# Patient Record
Sex: MALE | Race: BLACK OR AFRICAN AMERICAN | Employment: OTHER | URBAN - METROPOLITAN AREA
[De-identification: names, ages, dates, MRNs, and addresses within clinical notes are randomized per-mention and may not be internally consistent; named-entity substitution may affect disease eponyms.]

---

## 2017-05-11 ENCOUNTER — HOSPITAL ENCOUNTER (EMERGENCY)
Facility: HOSPITAL | Age: 32
Discharge: HOME | End: 2017-05-11
Attending: EMERGENCY MEDICINE
Payer: COMMERCIAL

## 2017-05-11 VITALS
OXYGEN SATURATION: 100 % | BODY MASS INDEX: 22.62 KG/M2 | TEMPERATURE: 98 F | HEART RATE: 92 BPM | RESPIRATION RATE: 18 BRPM | HEIGHT: 72 IN | DIASTOLIC BLOOD PRESSURE: 76 MMHG | SYSTOLIC BLOOD PRESSURE: 128 MMHG | WEIGHT: 167 LBS

## 2017-05-11 DIAGNOSIS — L98.491 CALLOUS ULCER, LIMITED TO BREAKDOWN OF SKIN (HCC): Primary | ICD-10-CM

## 2017-05-11 PROCEDURE — 99282 EMERGENCY DEPT VISIT SF MDM: CPT

## 2017-05-11 RX ORDER — GABAPENTIN 300 MG/1
300 CAPSULE ORAL DAILY
COMMUNITY
End: 2020-03-05

## 2017-05-11 NOTE — ED INITIAL ASSESSMENT (HPI)
32 y/o male to ED with c/o of ulcer to right toe. Patient instructed to come to ED by podiatrist to r/o infection.

## 2017-05-11 NOTE — ED PROVIDER NOTES
Patient Seen in: [facility] Emergency Department    History   Patient presents with:  Cellulitis (integumentary, infectious)    Stated Complaint: foot ulcer    HPI    Patient is a 33-year-old male comes in emergency room for evaluation of a wound to 1215 None (Room air)       Current:/76 mmHg  Pulse 92  Temp(Src) 98 °F (36.7 °C) (Temporal)  Resp 18  Ht 182.9 cm (6')  Wt 75.751 kg  BMI 22.64 kg/m2  SpO2 100%        Physical Exam    CONSTITUTIONAL: Well appearing, in no acute distress  LUNGS: Tari diagnosis)    Disposition:  Discharge    Follow-up:  Tamera Reid MD  [address] [place] [id number]  [phone]      As needed      Medications Prescribed:  Discharge Medication List as of 5/11/2017  1:36 PM

## 2017-09-08 ENCOUNTER — LAB ENCOUNTER (OUTPATIENT)
Dept: LAB | Age: 32
End: 2017-09-08
Attending: INTERNAL MEDICINE
Payer: COMMERCIAL

## 2017-09-08 DIAGNOSIS — N18.30 CHRONIC KIDNEY DISEASE, STAGE III (MODERATE) (HCC): Primary | ICD-10-CM

## 2017-09-08 DIAGNOSIS — R80.9 PROTEINURIA: ICD-10-CM

## 2017-09-08 LAB
BASOPHILS # BLD AUTO: 0.03 X10(3) UL (ref 0–0.1)
BASOPHILS NFR BLD AUTO: 0.6 %
BILIRUB UR QL STRIP.AUTO: NEGATIVE
CLARITY UR REFRACT.AUTO: CLEAR
COLOR UR AUTO: YELLOW
COMPLEMENT C3: 116 MG/DL (ref 90–180)
COMPLEMENT C4: 41.6 MG/DL (ref 10–40)
EOSINOPHIL # BLD AUTO: 0.08 X10(3) UL (ref 0–0.3)
EOSINOPHIL NFR BLD AUTO: 1.6 %
ERYTHROCYTE [DISTWIDTH] IN BLOOD BY AUTOMATED COUNT: 13.3 % (ref 11.5–16)
GLUCOSE UR STRIP.AUTO-MCNC: NEGATIVE MG/DL
HCT VFR BLD AUTO: 28.5 % (ref 37–53)
HEPATITIS C VIRUS AB INTERPRETATION: NONREACTIVE
HGB BLD-MCNC: 9.4 G/DL (ref 13–17)
IMMATURE GRANULOCYTE COUNT: 0.01 X10(3) UL (ref 0–1)
IMMATURE GRANULOCYTE RATIO %: 0.2 %
KETONES UR STRIP.AUTO-MCNC: NEGATIVE MG/DL
LEUKOCYTE ESTERASE UR QL STRIP.AUTO: NEGATIVE
LYMPHOCYTES # BLD AUTO: 1.42 X10(3) UL (ref 0.9–4)
LYMPHOCYTES NFR BLD AUTO: 28.9 %
MCH RBC QN AUTO: 28.7 PG (ref 27–33.2)
MCHC RBC AUTO-ENTMCNC: 33 G/DL (ref 31–37)
MCV RBC AUTO: 87.2 FL (ref 80–99)
MONOCYTES # BLD AUTO: 0.31 X10(3) UL (ref 0.1–0.6)
MONOCYTES NFR BLD AUTO: 6.3 %
NEUTROPHIL ABS PRELIM: 3.06 X10 (3) UL (ref 1.3–6.7)
NEUTROPHILS # BLD AUTO: 3.06 X10(3) UL (ref 1.3–6.7)
NEUTROPHILS NFR BLD AUTO: 62.4 %
NITRITE UR QL STRIP.AUTO: NEGATIVE
PH UR STRIP.AUTO: 6 [PH] (ref 4.5–8)
PLATELET # BLD AUTO: 301 10(3)UL (ref 150–450)
PROT UR STRIP.AUTO-MCNC: >=500 MG/DL
RBC # BLD AUTO: 3.27 X10(6)UL (ref 4.3–5.7)
RED CELL DISTRIBUTION WIDTH-SD: 41.8 FL (ref 35.1–46.3)
SP GR UR STRIP.AUTO: 1.01 (ref 1–1.03)
UROBILINOGEN UR STRIP.AUTO-MCNC: <2 MG/DL
WBC # BLD AUTO: 4.9 X10(3) UL (ref 4–13)

## 2017-09-08 PROCEDURE — 36415 COLL VENOUS BLD VENIPUNCTURE: CPT

## 2017-09-08 PROCEDURE — 81001 URINALYSIS AUTO W/SCOPE: CPT

## 2017-09-08 PROCEDURE — 87389 HIV-1 AG W/HIV-1&-2 AB AG IA: CPT

## 2017-09-08 PROCEDURE — 84156 ASSAY OF PROTEIN URINE: CPT

## 2017-09-08 PROCEDURE — 83883 ASSAY NEPHELOMETRY NOT SPEC: CPT

## 2017-09-08 PROCEDURE — 82595 ASSAY OF CRYOGLOBULIN: CPT

## 2017-09-08 PROCEDURE — 86803 HEPATITIS C AB TEST: CPT

## 2017-09-08 PROCEDURE — 84165 PROTEIN E-PHORESIS SERUM: CPT

## 2017-09-08 PROCEDURE — 86160 COMPLEMENT ANTIGEN: CPT

## 2017-09-08 PROCEDURE — 85025 COMPLETE CBC W/AUTO DIFF WBC: CPT

## 2017-09-08 PROCEDURE — 86335 IMMUNFIX E-PHORSIS/URINE/CSF: CPT

## 2017-09-08 PROCEDURE — 86038 ANTINUCLEAR ANTIBODIES: CPT

## 2017-09-08 PROCEDURE — 86334 IMMUNOFIX E-PHORESIS SERUM: CPT

## 2017-09-10 LAB — ANA SCREEN: NEGATIVE

## 2017-09-11 LAB
ALBUMIN, URINE: DETECTED
ALPHA-1, URINE: DETECTED
ALPHA-2, URINE: DETECTED
FREE UR KAPPA/LAMBDA RATIO: 8.4 RATIO
FREE UR LAMBDA LIGHT CHAIN: 1.25 MG/DL
FREE URINARY KAPPA LIGHT CHAIN: 10.5 MG/DL
GAMMA, URINE: DETECTED
URINE BETA GLOBULIN: DETECTED

## 2017-09-13 LAB
A/G RATIO: 1.28
ALBUMIN, SERUM: 3.53 G/DL (ref 3.5–4.8)
ALPHA-1 GLOBULIN: 0.18 G/DL (ref 0.1–0.3)
ALPHA-2 GLOBULIN: 0.87 G/DL (ref 0.6–1)
BETA GLOBULIN: 0.73 G/DL (ref 0.7–1.2)
GAMMA GLOBULIN: 0.98 G/DL (ref 0.6–1.6)
KAPPA FREE LIGHT CHAIN: 3.92 MG/DL (ref 0.33–1.94)
KAPPA/LAMBDA FLC RATIO: 1.91 (ref 0.26–1.65)
LAMBDA FREE LIGHT CHAIN: 2.05 MG/DL (ref 0.57–2.63)
TOTAL PROTEIN,SERUM: 6.3 G/DL (ref 6.1–8.3)

## 2019-09-23 ENCOUNTER — APPOINTMENT (OUTPATIENT)
Dept: GENERAL RADIOLOGY | Facility: HOSPITAL | Age: 34
End: 2019-09-23
Attending: EMERGENCY MEDICINE
Payer: MEDICARE

## 2019-09-23 ENCOUNTER — HOSPITAL ENCOUNTER (EMERGENCY)
Facility: HOSPITAL | Age: 34
Discharge: LEFT BEFORE DISCHARGE | End: 2019-09-23
Attending: EMERGENCY MEDICINE
Payer: MEDICARE

## 2019-09-23 VITALS
WEIGHT: 200 LBS | TEMPERATURE: 98 F | BODY MASS INDEX: 27 KG/M2 | SYSTOLIC BLOOD PRESSURE: 178 MMHG | RESPIRATION RATE: 15 BRPM | DIASTOLIC BLOOD PRESSURE: 102 MMHG | HEART RATE: 84 BPM | OXYGEN SATURATION: 100 %

## 2019-09-23 DIAGNOSIS — L98.491 SKIN ULCER, LIMITED TO BREAKDOWN OF SKIN (HCC): ICD-10-CM

## 2019-09-23 DIAGNOSIS — Z53.29 LEFT AGAINST MEDICAL ADVICE: ICD-10-CM

## 2019-09-23 DIAGNOSIS — L84 SKIN CALLUS: ICD-10-CM

## 2019-09-23 DIAGNOSIS — N17.9 ACUTE RENAL FAILURE, UNSPECIFIED ACUTE RENAL FAILURE TYPE (HCC): Primary | ICD-10-CM

## 2019-09-23 LAB
ANION GAP SERPL CALC-SCNC: 11 MMOL/L (ref 0–18)
BASOPHILS # BLD AUTO: 0.05 X10(3) UL (ref 0–0.2)
BASOPHILS NFR BLD AUTO: 0.6 %
BUN BLD-MCNC: 75 MG/DL (ref 7–18)
BUN/CREAT SERPL: 8.2 (ref 10–20)
CALCIUM BLD-MCNC: 6.1 MG/DL (ref 8.5–10.1)
CHLORIDE SERPL-SCNC: 109 MMOL/L (ref 98–112)
CO2 SERPL-SCNC: 19 MMOL/L (ref 21–32)
CREAT BLD-MCNC: 9.14 MG/DL (ref 0.7–1.3)
DEPRECATED RDW RBC AUTO: 42.5 FL (ref 35.1–46.3)
EOSINOPHIL # BLD AUTO: 0.12 X10(3) UL (ref 0–0.7)
EOSINOPHIL NFR BLD AUTO: 1.5 %
ERYTHROCYTE [DISTWIDTH] IN BLOOD BY AUTOMATED COUNT: 13 % (ref 11–15)
GLUCOSE BLD-MCNC: 110 MG/DL (ref 70–99)
HCT VFR BLD AUTO: 25.8 % (ref 39–53)
HGB BLD-MCNC: 8.3 G/DL (ref 13–17.5)
IMM GRANULOCYTES # BLD AUTO: 0.02 X10(3) UL (ref 0–1)
IMM GRANULOCYTES NFR BLD: 0.3 %
LYMPHOCYTES # BLD AUTO: 1.31 X10(3) UL (ref 1–4)
LYMPHOCYTES NFR BLD AUTO: 16.9 %
MCH RBC QN AUTO: 28.9 PG (ref 26–34)
MCHC RBC AUTO-ENTMCNC: 32.2 G/DL (ref 31–37)
MCV RBC AUTO: 89.9 FL (ref 80–100)
MONOCYTES # BLD AUTO: 0.51 X10(3) UL (ref 0.1–1)
MONOCYTES NFR BLD AUTO: 6.6 %
NEUTROPHILS # BLD AUTO: 5.75 X10 (3) UL (ref 1.5–7.7)
NEUTROPHILS # BLD AUTO: 5.75 X10(3) UL (ref 1.5–7.7)
NEUTROPHILS NFR BLD AUTO: 74.1 %
OSMOLALITY SERPL CALC.SUM OF ELEC: 311 MOSM/KG (ref 275–295)
PLATELET # BLD AUTO: 295 10(3)UL (ref 150–450)
POTASSIUM SERPL-SCNC: 4.8 MMOL/L (ref 3.5–5.1)
RBC # BLD AUTO: 2.87 X10(6)UL (ref 4.3–5.7)
SODIUM SERPL-SCNC: 139 MMOL/L (ref 136–145)
WBC # BLD AUTO: 7.8 X10(3) UL (ref 4–11)

## 2019-09-23 PROCEDURE — 99283 EMERGENCY DEPT VISIT LOW MDM: CPT

## 2019-09-23 PROCEDURE — 85025 COMPLETE CBC W/AUTO DIFF WBC: CPT | Performed by: EMERGENCY MEDICINE

## 2019-09-23 PROCEDURE — 80048 BASIC METABOLIC PNL TOTAL CA: CPT | Performed by: EMERGENCY MEDICINE

## 2019-09-23 PROCEDURE — 73630 X-RAY EXAM OF FOOT: CPT | Performed by: EMERGENCY MEDICINE

## 2019-09-23 PROCEDURE — 36415 COLL VENOUS BLD VENIPUNCTURE: CPT

## 2019-09-23 RX ORDER — ENALAPRILAT 2.5 MG/2ML
0.62 INJECTION INTRAVENOUS ONCE
Status: DISCONTINUED | OUTPATIENT
Start: 2019-09-23 | End: 2019-09-23

## 2019-09-23 NOTE — ED NOTES
Pt and family still wishing to leave AMA despite multiple attempts by ED physician, Rachel JIMENEZ and this RN to educate them on emergent need for evaluation of kidney function. AMA formed signed and added to paper chart.

## 2019-09-23 NOTE — ED NOTES
Parent at bedside questioning need for IV placement. Explained to pt and parent at bedside that IV is ordered due to the possible need for IV antibiotics.  This RN offered to do peripheral draw but educated pt that this might require a 2nd stick later to

## 2019-09-23 NOTE — ED PROVIDER NOTES
Patient Seen in: [facility] Emergency Department      History   Patient presents with:  Rash Skin Problem (integumentary)    Stated Complaint: blister to foot x 2 weeks, diabetic    HPI    Patient is a 61-year-old male. Medical history of diabetes. retraction  Extremities: Full ROM, no deformity, NVI  Back: Full range of motion  Skin: Large ulcerated callus to the base of the right great toe. Encompasses 1.5 inch x 0.75 inch. No surrounding erythema. No fluctuance. No ascending lymphangitis.   Spencer demonstrates a hemoglobin of 8.3    I recommended admission to the patient. His mother was immediately and aggressively in opposition of this.   In her opinion, the patient should have no medical intervention unless his cardiologist or other specialist agr

## 2019-09-23 NOTE — ED NOTES
Pt and parent declining ordered Vasotec at this time. Explained to pt and family at bedside why medication is indicated for high blood pressure noted during visit today. Pt states \"I don't want any blood pressure medication, I'll see my doctor. \"  ED

## 2019-09-23 NOTE — ED NOTES
This RN at bedside after [place] recommended admission to hospital.   Pt currently declining admission, mother at bedside states \"We want to get a second opinion. \"  ED physician made aware, called to bedside to further discuss results and possible care p

## 2020-02-06 PROBLEM — L97.511 SKIN ULCER OF RIGHT FOOT, LIMITED TO BREAKDOWN OF SKIN (HCC): Status: ACTIVE | Noted: 2020-02-06

## 2020-02-13 ENCOUNTER — OFFICE VISIT (OUTPATIENT)
Dept: WOUND CARE | Facility: HOSPITAL | Age: 35
End: 2020-02-13
Attending: NURSE PRACTITIONER
Payer: MEDICARE

## 2020-02-13 DIAGNOSIS — L97.511 SKIN ULCER OF RIGHT FOOT, LIMITED TO BREAKDOWN OF SKIN (HCC): Primary | ICD-10-CM

## 2020-02-13 LAB — GLUCOSE BLD-MCNC: 216 MG/DL (ref 70–99)

## 2020-02-13 PROCEDURE — 99214 OFFICE O/P EST MOD 30 MIN: CPT

## 2020-02-13 PROCEDURE — 11042 DBRDMT SUBQ TIS 1ST 20SQCM/<: CPT

## 2020-02-13 PROCEDURE — 82962 GLUCOSE BLOOD TEST: CPT

## 2020-02-13 NOTE — PROGRESS NOTES
Subjective    Chief Complaint  This information was obtained from the patient  Patient is here for an initial consult. He presents with a wound on his big toe that re-opened in September. He does not have pain to the wound as he has neuropathy.     Allergie This information was obtained from the patient, chart    Complaints and Symptoms  Patient complains of:  Eyes: Other (retinopathy)  Genitourinary (): Other (Hemodialysis)  Integumentary (Hair/Skin/Nails): Dryness, Calluses/Corns, Ulcers  Neurological: Lo The periwound skin exhibited: Callus, Dry/Scaly. The periwound skin did not exhibit: Edema, Erythema. The temperature of the periwound skin is WNL. Periwound skin does not exhibit signs or symptoms of infection.  Local Pulse is Normal.  General Notes:  Deep (Encounter Diagnosis) E11.621 - Type 2 diabetes mellitus with foot ulcer  (Encounter Diagnosis) Z79.4 - Long term (current) use of insulin        Procedures    Wound #1  Wound #1 (Diabetic Ulcer) is located on the right, plantar great toe.  A skin/subcutane Vitamin C: Citrus fruits and juices, strawberries, tomatoes, tomato juice, peppers, baked potatoes, spinach, broccoli, cauliflower, [place] sprouts, cabbage  Vitamin A: Dark green, leafy vegetables, orange or yellow vegetables, cantaloupe, fortified dairy Mckayla Patel, ERNESTO-AP, CFCN, CSWS, [facility], [facility] 02/13/2020 12:45:43       Entered By: George Kendrick on 02/13/2020 12:45:19

## 2020-02-18 ENCOUNTER — HOSPITAL ENCOUNTER (OUTPATIENT)
Dept: MRI IMAGING | Facility: HOSPITAL | Age: 35
Discharge: HOME | End: 2020-02-18
Attending: NURSE PRACTITIONER
Payer: MEDICARE

## 2020-02-18 DIAGNOSIS — L97.512 NON-PRESSURE CHRONIC ULCER OF OTHER PART OF RIGHT FOOT WITH FAT LAYER EXPOSED (HCC): ICD-10-CM

## 2020-02-18 PROCEDURE — 73718 MRI LOWER EXTREMITY W/O DYE: CPT | Performed by: NURSE PRACTITIONER

## 2020-02-20 ENCOUNTER — OFFICE VISIT (OUTPATIENT)
Dept: WOUND CARE | Facility: HOSPITAL | Age: 35
End: 2020-02-20
Attending: NURSE PRACTITIONER
Payer: MEDICARE

## 2020-02-20 DIAGNOSIS — L97.512 NON-PRESSURE CHRONIC ULCER OF OTHER PART OF RIGHT FOOT WITH FAT LAYER EXPOSED (HCC): Primary | ICD-10-CM

## 2020-02-20 DIAGNOSIS — L97.511 SKIN ULCER OF RIGHT FOOT, LIMITED TO BREAKDOWN OF SKIN (HCC): ICD-10-CM

## 2020-02-20 LAB — GLUCOSE BLD-MCNC: 240 MG/DL (ref 70–99)

## 2020-02-20 PROCEDURE — 11042 DBRDMT SUBQ TIS 1ST 20SQCM/<: CPT

## 2020-02-20 PROCEDURE — 82962 GLUCOSE BLOOD TEST: CPT

## 2020-02-20 NOTE — PROGRESS NOTES
Subjective    Chief Complaint  This information was obtained from the patient  Patient in Clinic for follow up on wound care to right great toe.  Denies pain due to neuropathy    Allergies  No known allergies    HPI  This information was obtained from the p Additional Information  Medication reconciliation completed at today's visit. : Yes        Objective    Wound Assessment(s)  Wound #1 Right, Plantar Great Toe is a chronic Hall Grade 1 Diabetic Ulcer and has received a status of Not Healed.  Subsequent wo dp/pt weakly palpable right. right lower Extremity free of varicosities, clubbing or edema. Capillary refill < 3 seconds. Digits are warm on the right. toenails are wnl for color, thickness and hygeine, but long in length. + hairgrowth on legs. .    Jackie Boykin Follow-Up Appointments  Return Appointment in 1 week. Other: - 1) get an appointment with ID (see below)  2) make an appointment for HBO consult and tech consult    HBOT  Evaluate for HBOT.   HBO Tech consult  Indication: - DFU with osteo    Misc/Additiona There is diffuse bone marrow edema on the STIR sequence with at least 1 small focus of decreased signal on the T1 weighted series in the mid to distal diaphysis of the distal phalanx of the right 1st toe, findings would be consistent with some reactive  carlos

## 2020-02-27 ENCOUNTER — OFFICE VISIT (OUTPATIENT)
Dept: WOUND CARE | Facility: HOSPITAL | Age: 35
End: 2020-02-27
Attending: NURSE PRACTITIONER
Payer: MEDICARE

## 2020-02-27 DIAGNOSIS — L97.511 SKIN ULCER OF RIGHT FOOT, LIMITED TO BREAKDOWN OF SKIN (HCC): ICD-10-CM

## 2020-02-27 DIAGNOSIS — L97.512 NON-PRESSURE CHRONIC ULCER OF OTHER PART OF RIGHT FOOT WITH FAT LAYER EXPOSED (HCC): Primary | ICD-10-CM

## 2020-02-27 LAB — GLUCOSE BLD-MCNC: 149 MG/DL (ref 70–99)

## 2020-02-27 PROCEDURE — 99213 OFFICE O/P EST LOW 20 MIN: CPT

## 2020-02-27 PROCEDURE — 82962 GLUCOSE BLOOD TEST: CPT

## 2020-03-04 ENCOUNTER — PATIENT OUTREACH (OUTPATIENT)
Dept: INFECTIOUS DISEASE | Facility: CLINIC | Age: 35
End: 2020-03-04

## 2020-03-04 NOTE — PROGRESS NOTES
INFECTIOUS DISEASE CONSULTATION    Joy Santacruz     1985 MRN [id number]   [facility]       PCP None Pcp     Requested by Bakari Phan    Reason for Consultation:  Ulcer left 1st toe, MRI findings    Histo auscultation bilaterally. No wheezes. No rhonchi. Cardiovascular: S1, S2.  Regular rate and rhythm. No murmurs. Abdomen: Soft, nontender, nondistended. Positive bowel sounds. Musculoskeletal: Full range of motion of all extremities.   No swelling note

## 2020-03-05 ENCOUNTER — OFFICE VISIT (OUTPATIENT)
Dept: WOUND CARE | Facility: HOSPITAL | Age: 35
End: 2020-03-05
Attending: NURSE PRACTITIONER
Payer: MEDICARE

## 2020-03-05 DIAGNOSIS — L97.511 SKIN ULCER OF RIGHT FOOT, LIMITED TO BREAKDOWN OF SKIN (HCC): ICD-10-CM

## 2020-03-05 DIAGNOSIS — L97.512 NON-PRESSURE CHRONIC ULCER OF OTHER PART OF RIGHT FOOT WITH FAT LAYER EXPOSED (HCC): Primary | ICD-10-CM

## 2020-03-05 LAB — GLUCOSE BLD-MCNC: 193 MG/DL (ref 70–99)

## 2020-03-05 PROCEDURE — 82962 GLUCOSE BLOOD TEST: CPT

## 2020-03-05 PROCEDURE — 11042 DBRDMT SUBQ TIS 1ST 20SQCM/<: CPT

## 2020-03-05 NOTE — PROGRESS NOTES
Subjective    Chief Complaint  This information was obtained from the patient  Patient in Clinic for follow up on wound care to right great toe. Denies any new concerns or pain. States he went to the doctors yesterday and his BP was fine.     Allergies  No Genitourinary (): Other (Hemodialysis)  Integumentary (Hair/Skin/Nails): Dryness, Calluses/Corns, Ulcers  Neurological: Loss of Protective Sensation    Patient denies complaints or symptoms related to:  Constitutional Symptoms (General Health):  Fatigue, bp elevated recheck improved, patient denies symptoms of headache, dizziness, sob, or vision changes. will continue to monitor. Pulse Regular and wnl for patient. Nohemi Santana Respirations easy and unlabored. Temperature wnl. Weight normal for height. . Appearance neat Wound #1 (Diabetic Ulcer) is located on the right, plantar great toe. A skin/subcutaneous tissue level excisional/surgical debridement with a total area debrided of 0.5 sq cm was performed by Sheila Reese NP.  Subcutaneous was removed along with devitali Vitamin A: Dark green, leafy vegetables, orange or yellow vegetables, cantaloupe, fortified dairy products, liver, fortified cereals  Zinc: Fortified cereals, red meats, seafood    S/S of Infection  Non-adherence    Additional Orders:    Care summary  Revi will utilize felted foam today and consider skin subs if the callus is less next week.     Electronic Signature(s)  Signed By: Date:  Fredrich Gowers FNP-KELLY, ERNESTO-AP, CFNATI, CSWS, [facility], [address] [place] [address] I-20 03/05/2020 09:12:12       Entered By: Fredrich Gowers on 03/05/2020 09:1

## 2020-03-06 ENCOUNTER — HOSPITAL ENCOUNTER (OUTPATIENT)
Dept: CV DIAGNOSTICS | Facility: HOSPITAL | Age: 35
Discharge: HOME | End: 2020-03-06
Attending: SURGERY
Payer: MEDICARE

## 2020-03-06 ENCOUNTER — HOSPITAL ENCOUNTER (OUTPATIENT)
Dept: LAB | Facility: HOSPITAL | Age: 35
Discharge: HOME | End: 2020-03-06
Attending: SURGERY
Payer: MEDICARE

## 2020-03-06 DIAGNOSIS — N18.6 ESRD (END STAGE RENAL DISEASE) (HCC): ICD-10-CM

## 2020-03-06 DIAGNOSIS — Z01.818 PREOP TESTING: ICD-10-CM

## 2020-03-06 DIAGNOSIS — Z91.89 AT RISK FOR BLEEDING: ICD-10-CM

## 2020-03-06 LAB
ALBUMIN SERPL-MCNC: 3.6 G/DL (ref 3.4–5)
ALBUMIN/GLOB SERPL: 0.8 {RATIO} (ref 1–2)
ALP LIVER SERPL-CCNC: 93 U/L (ref 45–117)
ALT SERPL-CCNC: 25 U/L (ref 16–61)
ANION GAP SERPL CALC-SCNC: 4 MMOL/L (ref 0–18)
APTT PPP: 39.6 SECONDS (ref 25.4–36.1)
AST SERPL-CCNC: 20 U/L (ref 15–37)
ATRIAL RATE: 87 BPM
BASOPHILS # BLD AUTO: 0.06 X10(3) UL (ref 0–0.2)
BASOPHILS NFR BLD AUTO: 0.9 %
BILIRUB SERPL-MCNC: 0.7 MG/DL (ref 0.1–2)
BUN BLD-MCNC: 22 MG/DL (ref 7–18)
BUN/CREAT SERPL: 4.9 (ref 10–20)
CALCIUM BLD-MCNC: 9.1 MG/DL (ref 8.5–10.1)
CHLORIDE SERPL-SCNC: 99 MMOL/L (ref 98–112)
CO2 SERPL-SCNC: 35 MMOL/L (ref 21–32)
CREAT BLD-MCNC: 4.5 MG/DL (ref 0.7–1.3)
DEPRECATED RDW RBC AUTO: 46.8 FL (ref 35.1–46.3)
EOSINOPHIL # BLD AUTO: 0.06 X10(3) UL (ref 0–0.7)
EOSINOPHIL NFR BLD AUTO: 0.9 %
ERYTHROCYTE [DISTWIDTH] IN BLOOD BY AUTOMATED COUNT: 14.6 % (ref 11–15)
GLOBULIN PLAS-MCNC: 4.3 G/DL (ref 2.8–4.4)
GLUCOSE BLD-MCNC: 136 MG/DL (ref 70–99)
HCT VFR BLD AUTO: 37.3 % (ref 39–53)
HGB BLD-MCNC: 12 G/DL (ref 13–17.5)
IMM GRANULOCYTES # BLD AUTO: 0.02 X10(3) UL (ref 0–1)
IMM GRANULOCYTES NFR BLD: 0.3 %
INR BLD: 0.93 (ref 0.9–1.1)
LYMPHOCYTES # BLD AUTO: 1.81 X10(3) UL (ref 1–4)
LYMPHOCYTES NFR BLD AUTO: 26.2 %
M PROTEIN MFR SERPL ELPH: 7.9 G/DL (ref 6.4–8.2)
MCH RBC QN AUTO: 28.7 PG (ref 26–34)
MCHC RBC AUTO-ENTMCNC: 32.2 G/DL (ref 31–37)
MCV RBC AUTO: 89.2 FL (ref 80–100)
MONOCYTES # BLD AUTO: 0.39 X10(3) UL (ref 0.1–1)
MONOCYTES NFR BLD AUTO: 5.6 %
NEUTROPHILS # BLD AUTO: 4.57 X10 (3) UL (ref 1.5–7.7)
NEUTROPHILS # BLD AUTO: 4.57 X10(3) UL (ref 1.5–7.7)
NEUTROPHILS NFR BLD AUTO: 66.1 %
OSMOLALITY SERPL CALC.SUM OF ELEC: 291 MOSM/KG (ref 275–295)
P AXIS: 57 DEGREES
P-R INTERVAL: 148 MS
PATIENT FASTING Y/N/NP: NO
PLATELET # BLD AUTO: 250 10(3)UL (ref 150–450)
POTASSIUM SERPL-SCNC: 3.7 MMOL/L (ref 3.5–5.1)
PSA SERPL DL<=0.01 NG/ML-MCNC: 12.8 SECONDS (ref 12.5–14.7)
Q-T INTERVAL: 410 MS
QRS DURATION: 92 MS
QTC CALCULATION (BEZET): 493 MS
R AXIS: 62 DEGREES
RBC # BLD AUTO: 4.18 X10(6)UL (ref 4.3–5.7)
SODIUM SERPL-SCNC: 138 MMOL/L (ref 136–145)
T AXIS: 59 DEGREES
VENTRICULAR RATE: 87 BPM
WBC # BLD AUTO: 6.9 X10(3) UL (ref 4–11)

## 2020-03-06 PROCEDURE — 36415 COLL VENOUS BLD VENIPUNCTURE: CPT | Performed by: SURGERY

## 2020-03-06 PROCEDURE — 85025 COMPLETE CBC W/AUTO DIFF WBC: CPT | Performed by: SURGERY

## 2020-03-06 PROCEDURE — 93005 ELECTROCARDIOGRAM TRACING: CPT

## 2020-03-06 PROCEDURE — 93010 ELECTROCARDIOGRAM REPORT: CPT | Performed by: INTERNAL MEDICINE

## 2020-03-06 PROCEDURE — 85610 PROTHROMBIN TIME: CPT | Performed by: SURGERY

## 2020-03-06 PROCEDURE — 85730 THROMBOPLASTIN TIME PARTIAL: CPT | Performed by: SURGERY

## 2020-03-06 PROCEDURE — 80053 COMPREHEN METABOLIC PANEL: CPT | Performed by: SURGERY

## 2020-03-08 ENCOUNTER — HOSPITAL ENCOUNTER (OUTPATIENT)
Dept: ULTRASOUND IMAGING | Age: 35
Discharge: HOME | End: 2020-03-08
Attending: SURGERY
Payer: MEDICARE

## 2020-03-08 DIAGNOSIS — T82.318A MECHANICAL BREAKDOWN OF OTHER VASCULAR GRAFT, INITIAL ENCOUNTER (HCC): ICD-10-CM

## 2020-03-08 DIAGNOSIS — N18.6 END STAGE RENAL DISEASE (HCC): ICD-10-CM

## 2020-03-08 DIAGNOSIS — Z01.818 PRE-OP TESTING: ICD-10-CM

## 2020-03-08 PROCEDURE — 93970 EXTREMITY STUDY: CPT | Performed by: SURGERY

## 2020-03-10 ENCOUNTER — OFFICE VISIT (OUTPATIENT)
Dept: WOUND CARE | Facility: HOSPITAL | Age: 35
End: 2020-03-10
Attending: NURSE PRACTITIONER
Payer: MEDICARE

## 2020-03-10 DIAGNOSIS — L97.511 SKIN ULCER OF RIGHT FOOT, LIMITED TO BREAKDOWN OF SKIN (HCC): ICD-10-CM

## 2020-03-10 DIAGNOSIS — L97.512 NON-PRESSURE CHRONIC ULCER OF OTHER PART OF RIGHT FOOT WITH FAT LAYER EXPOSED (HCC): Primary | ICD-10-CM

## 2020-03-10 LAB — GLUCOSE BLD-MCNC: 234 MG/DL (ref 70–99)

## 2020-03-10 PROCEDURE — 11042 DBRDMT SUBQ TIS 1ST 20SQCM/<: CPT

## 2020-03-10 PROCEDURE — 82962 GLUCOSE BLOOD TEST: CPT

## 2020-03-10 NOTE — PROGRESS NOTES
Subjective    Chief Complaint  This information was obtained from the patient  Patient here for follow up on wound care to right great toe. Denies any new concerns or pain.     Allergies  No known allergies    HPI  This information was obtained from the pat 3-10-20 patient returns today, he has significan callus build up again, the periwound is very moist, he does have an appointment next week with dr. Mode Valdez for hbo consult.   we discussed a knee roller or a forefoot offloading shoe to help with offloading The periwound skin exhibited: Callus, Moist, Maceration. The periwound skin did not exhibit: Edema, Erythema. The temperature of the periwound skin is WNL. Periwound skin does not exhibit signs or symptoms of infection.  Local Pulse is Normal.    Vitals  He Wound #1 (Diabetic Ulcer) is located on the right, plantar great toe. A skin/subcutaneous tissue level excisional/surgical debridement with a total area debrided of 1.2 sq cm was performed by Lisette Tran NP.  Subcutaneous was removed along with devitali Vitamin C: Citrus fruits and juices, strawberries, tomatoes, tomato juice, peppers, baked potatoes, spinach, broccoli, cauliflower, Bluebell sprouts, cabbage  Vitamin A: Dark green, leafy vegetables, orange or yellow vegetables, cantaloupe, fortified dairy Entered By: Miriam Wilkerson on 03/10/2020 09:52:15

## 2020-03-11 ENCOUNTER — ANESTHESIA EVENT (OUTPATIENT)
Dept: CARDIAC SURGERY | Facility: HOSPITAL | Age: 35
End: 2020-03-11
Payer: MEDICARE

## 2020-03-11 ENCOUNTER — APPOINTMENT (OUTPATIENT)
Dept: WOUND CARE | Facility: HOSPITAL | Age: 35
End: 2020-03-11
Attending: INTERNAL MEDICINE
Payer: MEDICARE

## 2020-03-12 ENCOUNTER — HOSPITAL ENCOUNTER (OUTPATIENT)
Facility: HOSPITAL | Age: 35
Setting detail: HOSPITAL OUTPATIENT SURGERY
Discharge: HOME | End: 2020-03-12
Attending: SURGERY | Admitting: SURGERY
Payer: MEDICARE

## 2020-03-12 ENCOUNTER — APPOINTMENT (OUTPATIENT)
Dept: WOUND CARE | Facility: HOSPITAL | Age: 35
End: 2020-03-12
Attending: NURSE PRACTITIONER
Payer: MEDICARE

## 2020-03-12 ENCOUNTER — ANESTHESIA (OUTPATIENT)
Dept: CARDIAC SURGERY | Facility: HOSPITAL | Age: 35
End: 2020-03-12
Payer: MEDICARE

## 2020-03-12 VITALS
HEART RATE: 84 BPM | TEMPERATURE: 98 F | RESPIRATION RATE: 18 BRPM | DIASTOLIC BLOOD PRESSURE: 95 MMHG | HEIGHT: 72 IN | BODY MASS INDEX: 26.41 KG/M2 | SYSTOLIC BLOOD PRESSURE: 169 MMHG | WEIGHT: 195 LBS | OXYGEN SATURATION: 100 %

## 2020-03-12 DIAGNOSIS — Z01.818 PREOP TESTING: ICD-10-CM

## 2020-03-12 DIAGNOSIS — Z91.89 AT RISK FOR BLEEDING: ICD-10-CM

## 2020-03-12 DIAGNOSIS — N18.6 ESRD (END STAGE RENAL DISEASE) (HCC): Primary | ICD-10-CM

## 2020-03-12 LAB
ANION GAP SERPL CALC-SCNC: 6 MMOL/L (ref 0–18)
BUN BLD-MCNC: 32 MG/DL (ref 7–18)
BUN/CREAT SERPL: 5 (ref 10–20)
CALCIUM BLD-MCNC: 8.8 MG/DL (ref 8.5–10.1)
CHLORIDE SERPL-SCNC: 102 MMOL/L (ref 98–112)
CO2 SERPL-SCNC: 31 MMOL/L (ref 21–32)
CREAT BLD-MCNC: 6.43 MG/DL (ref 0.7–1.3)
GLUCOSE BLD-MCNC: 149 MG/DL (ref 70–99)
GLUCOSE BLD-MCNC: 149 MG/DL (ref 70–99)
OSMOLALITY SERPL CALC.SUM OF ELEC: 298 MOSM/KG (ref 275–295)
POTASSIUM SERPL-SCNC: 4.2 MMOL/L (ref 3.5–5.1)
SODIUM SERPL-SCNC: 139 MMOL/L (ref 136–145)

## 2020-03-12 PROCEDURE — 82962 GLUCOSE BLOOD TEST: CPT

## 2020-03-12 PROCEDURE — 03180JD BYPASS LEFT BRACHIAL ARTERY TO UPPER ARM VEIN WITH SYNTHETIC SUBSTITUTE, OPEN APPROACH: ICD-10-PCS | Performed by: SURGERY

## 2020-03-12 PROCEDURE — 80048 BASIC METABOLIC PNL TOTAL CA: CPT | Performed by: SURGERY

## 2020-03-12 DEVICE — GRAFT 4-6X45 TAP: Type: IMPLANTABLE DEVICE | Site: ARM | Status: FUNCTIONAL

## 2020-03-12 RX ORDER — ONDANSETRON 2 MG/ML
INJECTION INTRAMUSCULAR; INTRAVENOUS AS NEEDED
Status: DISCONTINUED | OUTPATIENT
Start: 2020-03-12 | End: 2020-03-12 | Stop reason: SURG

## 2020-03-12 RX ORDER — BUPIVACAINE HYDROCHLORIDE 5 MG/ML
INJECTION, SOLUTION EPIDURAL; INTRACAUDAL AS NEEDED
Status: DISCONTINUED | OUTPATIENT
Start: 2020-03-12 | End: 2020-03-12 | Stop reason: SURG

## 2020-03-12 RX ORDER — SODIUM CHLORIDE, SODIUM LACTATE, POTASSIUM CHLORIDE, CALCIUM CHLORIDE 600; 310; 30; 20 MG/100ML; MG/100ML; MG/100ML; MG/100ML
INJECTION, SOLUTION INTRAVENOUS CONTINUOUS
Status: DISCONTINUED | OUTPATIENT
Start: 2020-03-12 | End: 2020-03-12

## 2020-03-12 RX ORDER — LIDOCAINE HYDROCHLORIDE 10 MG/ML
INJECTION, SOLUTION EPIDURAL; INFILTRATION; INTRACAUDAL; PERINEURAL AS NEEDED
Status: DISCONTINUED | OUTPATIENT
Start: 2020-03-12 | End: 2020-03-12 | Stop reason: SURG

## 2020-03-12 RX ORDER — DEXAMETHASONE SODIUM PHOSPHATE 4 MG/ML
VIAL (ML) INJECTION AS NEEDED
Status: DISCONTINUED | OUTPATIENT
Start: 2020-03-12 | End: 2020-03-12 | Stop reason: SURG

## 2020-03-12 RX ORDER — HYDROCODONE BITARTRATE AND ACETAMINOPHEN 5; 325 MG/1; MG/1
1 TABLET ORAL EVERY 6 HOURS PRN
Status: DISCONTINUED | OUTPATIENT
Start: 2020-03-12 | End: 2020-03-12

## 2020-03-12 RX ORDER — CALCIUM ACETATE 667 MG/1
2 CAPSULE ORAL 3 TIMES DAILY
COMMUNITY

## 2020-03-12 RX ORDER — HYDROMORPHONE HYDROCHLORIDE 1 MG/ML
0.4 INJECTION, SOLUTION INTRAMUSCULAR; INTRAVENOUS; SUBCUTANEOUS EVERY 5 MIN PRN
Status: DISCONTINUED | OUTPATIENT
Start: 2020-03-12 | End: 2020-03-12 | Stop reason: HOSPADM

## 2020-03-12 RX ORDER — DEXTROSE MONOHYDRATE 25 G/50ML
50 INJECTION, SOLUTION INTRAVENOUS
Status: DISCONTINUED | OUTPATIENT
Start: 2020-03-12 | End: 2020-03-12 | Stop reason: HOSPADM

## 2020-03-12 RX ORDER — ROCURONIUM BROMIDE 10 MG/ML
INJECTION, SOLUTION INTRAVENOUS AS NEEDED
Status: DISCONTINUED | OUTPATIENT
Start: 2020-03-12 | End: 2020-03-12 | Stop reason: SURG

## 2020-03-12 RX ORDER — CEFAZOLIN SODIUM/WATER 2 G/20 ML
SYRINGE (ML) INTRAVENOUS AS NEEDED
Status: DISCONTINUED | OUTPATIENT
Start: 2020-03-12 | End: 2020-03-12 | Stop reason: SURG

## 2020-03-12 RX ORDER — SODIUM CHLORIDE 9 MG/ML
INJECTION, SOLUTION INTRAVENOUS CONTINUOUS PRN
Status: DISCONTINUED | OUTPATIENT
Start: 2020-03-12 | End: 2020-03-12 | Stop reason: SURG

## 2020-03-12 RX ORDER — NALOXONE HYDROCHLORIDE 0.4 MG/ML
80 INJECTION, SOLUTION INTRAMUSCULAR; INTRAVENOUS; SUBCUTANEOUS AS NEEDED
Status: DISCONTINUED | OUTPATIENT
Start: 2020-03-12 | End: 2020-03-12 | Stop reason: HOSPADM

## 2020-03-12 RX ORDER — HYDROCODONE BITARTRATE AND ACETAMINOPHEN 5; 325 MG/1; MG/1
2 TABLET ORAL AS NEEDED
Status: DISCONTINUED | OUTPATIENT
Start: 2020-03-12 | End: 2020-03-12 | Stop reason: HOSPADM

## 2020-03-12 RX ORDER — MIDAZOLAM HYDROCHLORIDE 1 MG/ML
INJECTION INTRAMUSCULAR; INTRAVENOUS AS NEEDED
Status: DISCONTINUED | OUTPATIENT
Start: 2020-03-12 | End: 2020-03-12 | Stop reason: SURG

## 2020-03-12 RX ORDER — AMLODIPINE BESYLATE 10 MG/1
20 TABLET ORAL DAILY
Status: ON HOLD | COMMUNITY
End: 2020-07-06

## 2020-03-12 RX ORDER — HEPARIN SODIUM 5000 [USP'U]/ML
INJECTION, SOLUTION INTRAVENOUS; SUBCUTANEOUS AS NEEDED
Status: DISCONTINUED | OUTPATIENT
Start: 2020-03-12 | End: 2020-03-12 | Stop reason: SURG

## 2020-03-12 RX ORDER — HYDROCODONE BITARTRATE AND ACETAMINOPHEN 5; 325 MG/1; MG/1
1 TABLET ORAL AS NEEDED
Status: DISCONTINUED | OUTPATIENT
Start: 2020-03-12 | End: 2020-03-12 | Stop reason: HOSPADM

## 2020-03-12 RX ORDER — INSULIN ASPART 100 [IU]/ML
INJECTION, SOLUTION INTRAVENOUS; SUBCUTANEOUS ONCE
Status: DISCONTINUED | OUTPATIENT
Start: 2020-03-12 | End: 2020-03-12 | Stop reason: HOSPADM

## 2020-03-12 RX ORDER — PROTAMINE SULFATE 10 MG/ML
INJECTION, SOLUTION INTRAVENOUS AS NEEDED
Status: DISCONTINUED | OUTPATIENT
Start: 2020-03-12 | End: 2020-03-12 | Stop reason: SURG

## 2020-03-12 RX ADMIN — BUPIVACAINE HYDROCHLORIDE 15 ML: 5 INJECTION, SOLUTION EPIDURAL; INTRACAUDAL at 08:10:00

## 2020-03-12 RX ADMIN — PROTAMINE SULFATE 25 MG: 10 INJECTION, SOLUTION INTRAVENOUS at 10:08:00

## 2020-03-12 RX ADMIN — ONDANSETRON 4 MG: 2 INJECTION INTRAMUSCULAR; INTRAVENOUS at 08:39:00

## 2020-03-12 RX ADMIN — SODIUM CHLORIDE: 9 INJECTION, SOLUTION INTRAVENOUS at 10:37:00

## 2020-03-12 RX ADMIN — SODIUM CHLORIDE: 9 INJECTION, SOLUTION INTRAVENOUS at 07:38:00

## 2020-03-12 RX ADMIN — ROCURONIUM BROMIDE 20 MG: 10 INJECTION, SOLUTION INTRAVENOUS at 09:15:00

## 2020-03-12 RX ADMIN — LIDOCAINE HYDROCHLORIDE 50 MG: 10 INJECTION, SOLUTION EPIDURAL; INFILTRATION; INTRACAUDAL; PERINEURAL at 07:43:00

## 2020-03-12 RX ADMIN — HEPARIN SODIUM 11000 UNITS: 5000 INJECTION, SOLUTION INTRAVENOUS; SUBCUTANEOUS at 09:24:00

## 2020-03-12 RX ADMIN — DEXAMETHASONE SODIUM PHOSPHATE 4 MG: 4 MG/ML VIAL (ML) INJECTION at 08:39:00

## 2020-03-12 RX ADMIN — MIDAZOLAM HYDROCHLORIDE 2 MG: 1 INJECTION INTRAMUSCULAR; INTRAVENOUS at 07:41:00

## 2020-03-12 RX ADMIN — CEFAZOLIN SODIUM/WATER 2 G: 2 G/20 ML SYRINGE (ML) INTRAVENOUS at 07:50:00

## 2020-03-12 RX ADMIN — ROCURONIUM BROMIDE 20 MG: 10 INJECTION, SOLUTION INTRAVENOUS at 08:14:00

## 2020-03-12 NOTE — ANESTHESIA PREPROCEDURE EVALUATION
PRE-OP EVALUATION    Patient Name: Shantal Loyd    Pre-op Diagnosis: end stage renal disease    Procedure(s):  Creation of left arteriovenous fistula graft  SA [email or web address]    Surgeon(s) and Role:     Jessie Gay MD - Primary    Pre-op vitals reviewed .0 03/06/2020     Lab Results   Component Value Date     03/06/2020    K 3.7 03/06/2020    CL 99 03/06/2020    CO2 35.0 (H) 03/06/2020    BUN 22 (H) 03/06/2020    CREATSERUM 4.50 (H) 03/06/2020     (H) 03/06/2020    CA 9.1 03/06/2020

## 2020-03-12 NOTE — ANESTHESIA POSTPROCEDURE EVALUATION
[address] Patient Status:  Hospital Outpatient Surgery   Age/Gender 37year old male MRN [id number]   Location [address] [place] [address] Attending Harshil Ramirez MD   Hosp Day # 0 PCP Stacy Opitz       Anesthesia Post

## 2020-03-12 NOTE — OPERATIVE REPORT
Pre-op Dx: ESRD. Post-op Dx: same. Procedure: Evaluation Left distal forearm cephalic vein/ radial artery. Left Brachial- Axillary 4x6mm Propatene Gortex graft fistula.  Surgeon: Alfred/ Kimberlyt: Unruly EBL: 150 cc

## 2020-03-12 NOTE — DISCHARGE SUMMARY
[facility]    PATIENT'S NAME: Renetta Flowers   ATTENDING PHYSICIAN: Adelfo Arteaga M.D.    PATIENT ACCOUNT#:   [de-identified]    LOCATION:  [address]  MEDICAL RECORD #:   [id number]       YOB: 1985  ADMISSION DATE

## 2020-03-12 NOTE — OPERATIVE REPORT
[facility]    PATIENT'S NAME: Yuni Blood   ATTENDING PHYSICIAN: Chai Daley M.D. OPERATING PHYSICIAN: Chai Daley M.D.    PATIENT ACCOUNT#:   [de-identified]    LOCATION:  [place] [address] ED  MEDICAL RECORD #:   [id number]       DATE OF VENESSA future infection. The options of prosthetic graft fistula, autogenous fistula which I wanted to do, as well as a permacath and peritoneal dialysis were also explained. The patient understood and agreed. All questions answered.   The option of no treatmen the axillary-basilic vein junction. The patient had a good vein distally. The artery was good quality. It appeared to be about 3 mm in diameter. The patient had a subcutaneous tunnel created. The patient received a bolus of heparin.   A 4 x 6 mm tapere correct. The patient received 500 mL of crystalloid. There was good Doppler flow in the radial, ulnar, and palmar arch at the end of procedure. Excellent thrill through the area of the fistula.      FINAL DIAGNOSIS:  End-stage renal disease treated with

## 2020-03-12 NOTE — ANESTHESIA PROCEDURE NOTES
Airway  Date/Time: 3/12/2020 7:45 AM  Urgency: elective    Airway not difficult    General Information and Staff    Patient location during procedure: OR  Anesthesiologist: Chary Sosa MD  Performed: anesthesiologist     Indications and Patient Condition

## 2020-03-12 NOTE — ANESTHESIA PROCEDURE NOTES
Infraclavicular + Intercostobrachial (field block)  Performed by: Kong Corey MD  Authorized by: Kong Corey MD       General Information and Staff    Start Time:  3/12/2020 8:07 AM  End Time:  3/12/2020 8:10 AM  Anesthesiologist:  Kong Corey MD  Pe Comments    Infraclavicular 20ml 0.25% Ropivacaine   Intercostobrachial 15ml 0.5% Bupivacaine

## 2020-03-18 ENCOUNTER — OFFICE VISIT (OUTPATIENT)
Dept: WOUND CARE | Facility: HOSPITAL | Age: 35
End: 2020-03-18
Attending: INTERNAL MEDICINE
Payer: MEDICARE

## 2020-03-18 DIAGNOSIS — M86.9 DIABETIC FOOT ULCER WITH OSTEOMYELITIS (HCC): Primary | ICD-10-CM

## 2020-03-18 DIAGNOSIS — R94.31 ABNORMAL EKG: ICD-10-CM

## 2020-03-18 DIAGNOSIS — E11.69 DIABETIC FOOT ULCER WITH OSTEOMYELITIS (HCC): Primary | ICD-10-CM

## 2020-03-18 DIAGNOSIS — L97.509 DIABETIC FOOT ULCER WITH OSTEOMYELITIS (HCC): Primary | ICD-10-CM

## 2020-03-18 DIAGNOSIS — L97.512 NON-PRESSURE CHRONIC ULCER OF OTHER PART OF RIGHT FOOT WITH FAT LAYER EXPOSED (HCC): ICD-10-CM

## 2020-03-18 DIAGNOSIS — E11.621 DIABETIC FOOT ULCER WITH OSTEOMYELITIS (HCC): Primary | ICD-10-CM

## 2020-03-18 LAB — GLUCOSE BLD-MCNC: 256 MG/DL (ref 70–99)

## 2020-03-18 PROCEDURE — 99214 OFFICE O/P EST MOD 30 MIN: CPT

## 2020-03-18 PROCEDURE — 82962 GLUCOSE BLOOD TEST: CPT

## 2020-03-18 NOTE — PROGRESS NOTES
Chief Complaint  This information was obtained from the patient  Patient here for HBO consult on wound care to right great toe. Denies any new concerns or pain.     Allergies  No known allergies    HPI  This information was obtained from the patient, ch distal diaphysis of the distal phalanx of the right 1st toe, findings would be consistent with some reactive bone marrow and probable early osteomyelitis. No abscess. Details above.     Dictated by: Anai Celestin MD on 2/18/2020 at 11:13     Approved recurred, mri + osteo. we are working on offloading. i reminded him to get appointment for hbo consult. will utilize felted foam with darco today, will consider skin subs next week if we can get pressure accomodated.   we discussed after healing he will Calluses/Corns, Ulcers  Neurological: Loss of Protective Sensation    Patient denies complaints or symptoms related to:  Constitutional Symptoms (General Health):  Fatigue, Fever, Loss of Appetite, Marked Weight Change, Night Sweats, Chills  Respiratory: Co Head- NCAT – Eyes- no pallor, JOLYNN EOMI ; No JVD; oral mucosa moist; CVS – Hr ok S1 s2 normal ; Lungs – clear AE good; Abdomen soft BS+ Neuro – grossly intact ; Wound – as described.         Assessment    Active Problems    ICD-10  (Encounter Diagnosis) M86 antabuse, or sulfamylon: . Patient does not report signs and symptoms of URI, sinus problems, fever, or viral infection: .     Eyes  No history of myopia, cataracts, or optic neuritis: .    Ears  Patient has history of[de-identified] No ear abnormalities or conditions acknowledges understanding of all instructions and all questions were answered.  - CHECK LABS - REPEAT EKG AS IT WAS ABNORMAL WHEN DONE IN EARLY MARCH 2020      Orders Placed This Encounter      CBC W Differential W Platelet          Standing Status: Future

## 2020-04-02 ENCOUNTER — OFFICE VISIT (OUTPATIENT)
Dept: WOUND CARE | Facility: HOSPITAL | Age: 35
End: 2020-04-02
Attending: INTERNAL MEDICINE
Payer: MEDICARE

## 2020-04-02 DIAGNOSIS — L97.509 DIABETIC FOOT ULCER WITH OSTEOMYELITIS (HCC): Primary | ICD-10-CM

## 2020-04-02 DIAGNOSIS — E11.69 DIABETIC FOOT ULCER WITH OSTEOMYELITIS (HCC): Primary | ICD-10-CM

## 2020-04-02 DIAGNOSIS — E11.621 DIABETIC FOOT ULCER WITH OSTEOMYELITIS (HCC): Primary | ICD-10-CM

## 2020-04-02 DIAGNOSIS — L97.511 SKIN ULCER OF RIGHT FOOT, LIMITED TO BREAKDOWN OF SKIN (HCC): ICD-10-CM

## 2020-04-02 DIAGNOSIS — M86.9 DIABETIC FOOT ULCER WITH OSTEOMYELITIS (HCC): Primary | ICD-10-CM

## 2020-04-02 DIAGNOSIS — L97.512 NON-PRESSURE CHRONIC ULCER OF OTHER PART OF RIGHT FOOT WITH FAT LAYER EXPOSED (HCC): ICD-10-CM

## 2020-04-02 PROCEDURE — 82962 GLUCOSE BLOOD TEST: CPT

## 2020-04-02 PROCEDURE — 97597 DBRDMT OPN WND 1ST 20 CM/<: CPT

## 2020-04-02 NOTE — PROGRESS NOTES
Subjective    Chief Complaint  This information was obtained from the patient  Patient here for a follow up for right plantar foot wound.  He states he wound is about the same    Allergies  No known allergies    HPI  This information was obtained from the p 3/18/2020: Hyperbaric oxygen therapy consultation note:  41-year-old -American male here for evaluation for hyperbaric oxygen therapy. He has a diabetic foot ulcer Riya stage III because of early osteomyelitis diagnosed by MRI on 2/18/2020.   He w Constitutional Symptoms (General Health):  Fatigue, Fever, Loss of Appetite, Marked Weight Change, Night Sweats, Chills  Respiratory: Cough, Shortness of Breath  Cardiovascular (Central/Peripheral): Chest Pain, Dyspnea on Exertion, Edema  Musculoskeletal: B dp/pt weakly palpable right. right lower Extremity free of varicosities, clubbing or edema. Capillary refill < 3 seconds. Digits are warm on the right. toenails are wnl for color, thickness and hygeine, but long in length. + hairgrowth on legs. .    Claria Socks Darco shoe with peg insert - plus a rolled gauze under the toe to \"lift it\"    Follow-Up Appointments  Return Appointment in 1 week. Other: - bring your forefoot offloading wedge shoe    HBOT  Evaluate for HBOT.   HBO Tech consult  Indication: - DFU with There is diffuse bone marrow edema on the STIR sequence with at least 1 small focus of decreased signal on the T1 weighted series in the mid to distal diaphysis of the distal phalanx of the right 1st toe, findings would be consistent with some reactive  carlos

## 2020-04-10 ENCOUNTER — OFFICE VISIT (OUTPATIENT)
Dept: WOUND CARE | Facility: HOSPITAL | Age: 35
End: 2020-04-10
Attending: NURSE PRACTITIONER
Payer: MEDICARE

## 2020-04-10 DIAGNOSIS — L97.509 DIABETIC FOOT ULCER WITH OSTEOMYELITIS (HCC): Primary | ICD-10-CM

## 2020-04-10 DIAGNOSIS — L97.511 SKIN ULCER OF RIGHT FOOT, LIMITED TO BREAKDOWN OF SKIN (HCC): ICD-10-CM

## 2020-04-10 DIAGNOSIS — M86.9 DIABETIC FOOT ULCER WITH OSTEOMYELITIS (HCC): Primary | ICD-10-CM

## 2020-04-10 DIAGNOSIS — E11.621 DIABETIC FOOT ULCER WITH OSTEOMYELITIS (HCC): Primary | ICD-10-CM

## 2020-04-10 DIAGNOSIS — L97.512 NON-PRESSURE CHRONIC ULCER OF OTHER PART OF RIGHT FOOT WITH FAT LAYER EXPOSED (HCC): ICD-10-CM

## 2020-04-10 DIAGNOSIS — E11.69 DIABETIC FOOT ULCER WITH OSTEOMYELITIS (HCC): Primary | ICD-10-CM

## 2020-04-10 PROCEDURE — 82962 GLUCOSE BLOOD TEST: CPT

## 2020-04-10 PROCEDURE — 97597 DBRDMT OPN WND 1ST 20 CM/<: CPT

## 2020-04-10 NOTE — PROGRESS NOTES
Subjective    Chief Complaint  This information was obtained from the patient  Patient here for a follow up for right plantar foot wound.  He voiced no new concerns    Allergies  No known allergies    HPI  This information was obtained from the patient, laura control through our clinic since February 13, 2020. He has been having this wound open for the last 4 to 5 months and has been under the care of a different podiatrist before that.   The wound has failed to improve and with conservative management includin Breath  Cardiovascular (Central/Peripheral): Chest Pain, Dyspnea on Exertion, Edema  Musculoskeletal: Backache, Contractures, Assistive Devices  Psychiatric: Claustrophobia, Nervousness / Tension, Memory Loss, Depression    Additional Information  Marco Lentz station stable. Integumentary (Hair, Skin)  see wound documentation. Psychiatric:  Judgment and insight intact. Alert and oriented times 3. No evidence of depression, anxiety, or agitation. Calm, cooperative, and communicative.  Appropriate interactio chain amino acids that help with tissue building and wound healing) and take 2 packets/day.  you can order online at [employer] or [address] [place] [address] or some [employer] carry it or can order it for you  AND  FOCUS ON THE FOLLOWING FOODS:  Protein: Meats, beans, eggs, milk a osteomyelitis, MRI may  be considered    Follow-Up Appointments:  A follow-up appointment should be scheduled. still battling the offloading issue.  will trial his forefoot offloader with front wedge shoe    Electronic Signature(s)  Signed By: Date:

## 2020-04-17 ENCOUNTER — OFFICE VISIT (OUTPATIENT)
Dept: WOUND CARE | Facility: HOSPITAL | Age: 35
End: 2020-04-17
Attending: NURSE PRACTITIONER
Payer: MEDICARE

## 2020-04-17 DIAGNOSIS — L97.511 SKIN ULCER OF RIGHT FOOT, LIMITED TO BREAKDOWN OF SKIN (HCC): ICD-10-CM

## 2020-04-17 DIAGNOSIS — E11.69 DIABETIC FOOT ULCER WITH OSTEOMYELITIS (HCC): Primary | ICD-10-CM

## 2020-04-17 DIAGNOSIS — L97.509 DIABETIC FOOT ULCER WITH OSTEOMYELITIS (HCC): Primary | ICD-10-CM

## 2020-04-17 DIAGNOSIS — M86.9 DIABETIC FOOT ULCER WITH OSTEOMYELITIS (HCC): Primary | ICD-10-CM

## 2020-04-17 DIAGNOSIS — E11.621 DIABETIC FOOT ULCER WITH OSTEOMYELITIS (HCC): Primary | ICD-10-CM

## 2020-04-17 DIAGNOSIS — L97.512 NON-PRESSURE CHRONIC ULCER OF OTHER PART OF RIGHT FOOT WITH FAT LAYER EXPOSED (HCC): ICD-10-CM

## 2020-04-17 PROCEDURE — 99213 OFFICE O/P EST LOW 20 MIN: CPT

## 2020-04-17 PROCEDURE — 82962 GLUCOSE BLOOD TEST: CPT

## 2020-04-17 NOTE — PROGRESS NOTES
Subjective    Chief Complaint  This information was obtained from the patient  Patient is here for a wound care follow up. He denies any pain to the wound. He does have a new blister that he noticed on Sunday.     Allergies  No known allergies    HPI  This 3/18/2020: Hyperbaric oxygen therapy consultation note:  25-year-old -American male here for evaluation for hyperbaric oxygen therapy. He has a diabetic foot ulcer Riya stage III because of early osteomyelitis diagnosed by MRI on 2/18/2020.   He w 4-17-20 patient returns today he developed a blister on his plantar medial forefoot most likely from friction in the front wedge shoe. we discussed again offloading and gave him some options and reiiterated my concern.   we showed him a knee roller and he d Wound #1 Right, Plantar Great Toe is a chronic Hall Grade 1 Diabetic Ulcer and has received a status of Not Healed.  Subsequent wound encounter measurements are 1.1cm length x 0.8cm width x 0.3cm depth, with an area of 0.88 sq cm and a volume of 0.264 cub bp wnl for patient. Pulse Regular and wnl for patient. Irene Riedel Respirations easy and unlabored. Temperature wnl. Weight normal for height. . Appearance neat and clean. Appears in no acute distress. Well nourished and well developed.     Cardiovascular:  dp/pt weakl Increase dietary protein - look for Nick by [employer] (These are essential branch chain amino acids that help with tissue building and wound healing) and take 2 packets/day.  you can order online at [employer] or [address] [place] [address] or some [employer] carry it or can orde bone marrow and probable early osteomyelitis. No abscess. Details above. Sept 2019 xray IMPRESSION: Soft tissue ulceration/callous along the great toe without radiographic  evidence of osteomyelitis.  If there is strong clinical concern for osteomyelitis,

## 2020-04-22 ENCOUNTER — OFFICE VISIT (OUTPATIENT)
Dept: WOUND CARE | Facility: HOSPITAL | Age: 35
End: 2020-04-22
Attending: NURSE PRACTITIONER
Payer: MEDICARE

## 2020-04-22 DIAGNOSIS — E11.69 DIABETIC FOOT ULCER WITH OSTEOMYELITIS (HCC): Primary | ICD-10-CM

## 2020-04-22 DIAGNOSIS — M86.9 DIABETIC FOOT ULCER WITH OSTEOMYELITIS (HCC): Primary | ICD-10-CM

## 2020-04-22 DIAGNOSIS — E11.621 DIABETIC FOOT ULCER WITH OSTEOMYELITIS (HCC): Primary | ICD-10-CM

## 2020-04-22 DIAGNOSIS — L97.511 SKIN ULCER OF RIGHT FOOT, LIMITED TO BREAKDOWN OF SKIN (HCC): ICD-10-CM

## 2020-04-22 DIAGNOSIS — L97.509 DIABETIC FOOT ULCER WITH OSTEOMYELITIS (HCC): Primary | ICD-10-CM

## 2020-04-22 DIAGNOSIS — L97.512 NON-PRESSURE CHRONIC ULCER OF OTHER PART OF RIGHT FOOT WITH FAT LAYER EXPOSED (HCC): ICD-10-CM

## 2020-04-22 PROCEDURE — 99213 OFFICE O/P EST LOW 20 MIN: CPT

## 2020-04-22 PROCEDURE — 82962 GLUCOSE BLOOD TEST: CPT

## 2020-04-22 NOTE — PROGRESS NOTES
Subjective    Chief Complaint  This information was obtained from the patient  Patient is here for a wound care follow up. He denies any pain or new wound concerns.     Allergies  No known allergies    HPI  This information was obtained from the patient, raza sugar control through our clinic since February 13, 2020. He has been having this wound open for the last 4 to 5 months and has been under the care of a different podiatrist before that.   The wound has failed to improve and with conservative management in appointment with him on May 13.  i discussed  walker with patient he is agreeable. he states he also made a toe lift from a sponge-the wound does have less callus today. no acute s/s of infection.     General Notes:  Patient instructed to purchase a [facility] symptoms of infection. Local Pulse is Normal.    Wound #2 Right, Medial, Plantar Foot is an acute Hall Grade 0 Diabetic Ulcer and has received a status of Not Healed.  Subsequent wound encounter measurements are 2cm length x 2cm width with no measurable d (mmHg):: 157/92        Assessment    Active Problems    ICD-10  (Encounter Diagnosis) L97.512 - Non-pressure chronic ulcer of other part of right foot with fat layer exposed  (Encounter Diagnosis) M86.171 - Other acute osteomyelitis, right ankle and foot infection. Perfusion assessed by doppler. - bi-tripashic signals at the dp/pt/distal hallux bilaterally  Perfusion assessed by palpation of pulses.   Last sharp debridement date: - 4-2-20  Last A1C date and value: - Sept 2019 a1c 6.5  Last albumin date and

## 2020-04-29 ENCOUNTER — OFFICE VISIT (OUTPATIENT)
Dept: WOUND CARE | Facility: HOSPITAL | Age: 35
End: 2020-04-29
Attending: NURSE PRACTITIONER
Payer: MEDICARE

## 2020-04-29 DIAGNOSIS — L97.512 NON-PRESSURE CHRONIC ULCER OF OTHER PART OF RIGHT FOOT WITH FAT LAYER EXPOSED (HCC): ICD-10-CM

## 2020-04-29 DIAGNOSIS — M86.9 DIABETIC FOOT ULCER WITH OSTEOMYELITIS (HCC): Primary | ICD-10-CM

## 2020-04-29 DIAGNOSIS — E11.621 DIABETIC FOOT ULCER WITH OSTEOMYELITIS (HCC): Primary | ICD-10-CM

## 2020-04-29 DIAGNOSIS — E11.69 DIABETIC FOOT ULCER WITH OSTEOMYELITIS (HCC): Primary | ICD-10-CM

## 2020-04-29 DIAGNOSIS — L97.509 DIABETIC FOOT ULCER WITH OSTEOMYELITIS (HCC): Primary | ICD-10-CM

## 2020-04-29 PROCEDURE — 82962 GLUCOSE BLOOD TEST: CPT

## 2020-04-29 PROCEDURE — 99213 OFFICE O/P EST LOW 20 MIN: CPT

## 2020-04-29 NOTE — PROGRESS NOTES
Subjective    Chief Complaint  This information was obtained from the patient  Patient is here for a wound care follow up. He denies any pain or new wound concerns.     Allergies  No known allergies    HPI  This information was obtained from the patient, raza sugar control through our clinic since February 13, 2020. He has been having this wound open for the last 4 to 5 months and has been under the care of a different podiatrist before that.   The wound has failed to improve and with conservative management in appointment with him on May 13.  i discussed  walker with patient he is agreeable. he states he also made a toe lift from a sponge-the wound does have less callus today. no acute s/s of infection.     4-29-20 patient returns today, he has been utilizing callus. Wound bed has % pale grey, pink, spongy granulation. The periwound skin moisture is normal. The periwound skin exhibited: Callus. The periwound skin did not exhibit: Edema, Erythema. The temperature of the periwound skin is WNL.  Periwound sk insight intact. Alert and oriented times 3. No evidence of depression, anxiety, or agitation. Calm, cooperative, and communicative. Appropriate interactions and affect.         Assessment    Active Problems    ICD-10  (Encounter Diagnosis) L97.512 - Non-pre 1st toe, findings would be consistent with some reactive  bone marrow and probable early osteomyelitis. No abscess. Details above.   Sept 2019 xray IMPRESSION: Soft tissue ulceration/callous along the great toe without radiographic  evidence of osteomyeliti

## 2020-05-06 ENCOUNTER — OFFICE VISIT (OUTPATIENT)
Dept: WOUND CARE | Facility: HOSPITAL | Age: 35
End: 2020-05-06
Attending: NURSE PRACTITIONER
Payer: MEDICARE

## 2020-05-06 DIAGNOSIS — E11.621 DIABETIC FOOT ULCER WITH OSTEOMYELITIS (HCC): Primary | ICD-10-CM

## 2020-05-06 DIAGNOSIS — E11.69 DIABETIC FOOT ULCER WITH OSTEOMYELITIS (HCC): Primary | ICD-10-CM

## 2020-05-06 DIAGNOSIS — L97.509 DIABETIC FOOT ULCER WITH OSTEOMYELITIS (HCC): Primary | ICD-10-CM

## 2020-05-06 DIAGNOSIS — M86.9 DIABETIC FOOT ULCER WITH OSTEOMYELITIS (HCC): Primary | ICD-10-CM

## 2020-05-06 DIAGNOSIS — L97.511 SKIN ULCER OF RIGHT FOOT, LIMITED TO BREAKDOWN OF SKIN (HCC): ICD-10-CM

## 2020-05-06 DIAGNOSIS — L97.512 NON-PRESSURE CHRONIC ULCER OF OTHER PART OF RIGHT FOOT WITH FAT LAYER EXPOSED (HCC): ICD-10-CM

## 2020-05-06 PROCEDURE — 82962 GLUCOSE BLOOD TEST: CPT

## 2020-05-06 PROCEDURE — 11042 DBRDMT SUBQ TIS 1ST 20SQCM/<: CPT

## 2020-05-06 NOTE — PROGRESS NOTES
Subjective    Chief Complaint  This information was obtained from the patient  Patient is here for a wound care follow up. He denies any pain or new wound concerns.     Allergies  No known allergies    HPI  This information was obtained from the patient, raza 3/18/2020: Hyperbaric oxygen therapy consultation note:  38-year-old -American male here for evaluation for hyperbaric oxygen therapy. He has a diabetic foot ulcer Riya stage III because of early osteomyelitis diagnosed by MRI on 2/18/2020.   He w 4-17-20 patient returns today he developed a blister on his plantar medial forefoot most likely from friction in the front wedge shoe. we discussed again offloading and gave him some options and reiiterated my concern.   we showed him a knee roller and he d Cardiovascular (Central/Peripheral): Chest Pain, Dyspnea on Exertion, Edema  Musculoskeletal: Backache, Contractures, Assistive Devices  Psychiatric: Claustrophobia, Nervousness / Tension, Memory Loss, Depression    Additional Information  Medication recon The periwound skin texture is normal. The periwound skin color is normal. The periwound skin exhibited: Dry/Scaly. The temperature of the periwound skin is WNL. Periwound skin does not exhibit signs or symptoms of infection.  Local Pulse is Normal.  General Wound #1 (Diabetic Ulcer) is located on the right, plantar great toe. A skin/subcutaneous tissue level excisional/surgical debridement with a total area debrided of 1.21 sq cm was performed by Satish Wood NP.  Subcutaneous was removed along with devital 1. There is a small ulceration along the plantar aspect of the left 1st digit at the a proximal IP joint space level. There is infiltration of the surrounding fat with granulation tissue noted adjacent to the distal phalanx of the right 1st digit.   There i

## 2020-05-13 ENCOUNTER — OFFICE VISIT (OUTPATIENT)
Dept: WOUND CARE | Facility: HOSPITAL | Age: 35
End: 2020-05-13
Attending: NURSE PRACTITIONER
Payer: MEDICARE

## 2020-05-13 DIAGNOSIS — E11.69 DIABETIC FOOT ULCER WITH OSTEOMYELITIS (HCC): Primary | ICD-10-CM

## 2020-05-13 DIAGNOSIS — L97.509 DIABETIC FOOT ULCER WITH OSTEOMYELITIS (HCC): Primary | ICD-10-CM

## 2020-05-13 DIAGNOSIS — M86.9 DIABETIC FOOT ULCER WITH OSTEOMYELITIS (HCC): Primary | ICD-10-CM

## 2020-05-13 DIAGNOSIS — E11.621 DIABETIC FOOT ULCER WITH OSTEOMYELITIS (HCC): Primary | ICD-10-CM

## 2020-05-13 DIAGNOSIS — L97.512 NON-PRESSURE CHRONIC ULCER OF OTHER PART OF RIGHT FOOT WITH FAT LAYER EXPOSED (HCC): ICD-10-CM

## 2020-05-13 DIAGNOSIS — L97.511 SKIN ULCER OF RIGHT FOOT, LIMITED TO BREAKDOWN OF SKIN (HCC): ICD-10-CM

## 2020-05-13 PROCEDURE — 82962 GLUCOSE BLOOD TEST: CPT

## 2020-05-13 PROCEDURE — 99213 OFFICE O/P EST LOW 20 MIN: CPT

## 2020-05-13 NOTE — PROGRESS NOTES
Subjective    Chief Complaint  This information was obtained from the patient  Patient is here for a wound care follow up. He denies any pain or new wound concerns.     Allergies  No known allergies    HPI  This information was obtained from the patient, raza 4-22-20 patient returns today, i s/w dr Franklin Romero and he gave suggestion of  walker. patient also has an appointment with him on May 13.  i discussed  walker with patient he is agreeable.   he states he also made a toe lift from a sponge-the wound does h Cardiovascular (Central/Peripheral): Chest Pain, Dyspnea on Exertion, Edema  Musculoskeletal: Backache, Contractures, Assistive Devices  Psychiatric: Claustrophobia, Nervousness / Tension, Memory Loss, Depression    Additional Information  Medication recon The periwound skin texture is normal. The periwound skin color is normal. The periwound skin exhibited: Dry/Scaly. The temperature of the periwound skin is WNL. Periwound skin does not exhibit signs or symptoms of infection.  Local Pulse is Normal.  General Change Dressing Every Other Day and As Needed. Wound #2 Right, Medial, Plantar Foot    Wound Cleansing & Dressings:  Betadine    Additional Orders:    Off-Loading:  Other: -  walker    Follow-Up Appointments:  Return Appointment in 2 weeks.   Other: -

## 2020-05-20 ENCOUNTER — OFFICE VISIT (OUTPATIENT)
Dept: WOUND CARE | Facility: HOSPITAL | Age: 35
End: 2020-05-20
Attending: NURSE PRACTITIONER
Payer: MEDICARE

## 2020-05-20 DIAGNOSIS — L97.509 DIABETIC FOOT ULCER WITH OSTEOMYELITIS (HCC): Primary | ICD-10-CM

## 2020-05-20 DIAGNOSIS — E11.69 DIABETIC FOOT ULCER WITH OSTEOMYELITIS (HCC): Primary | ICD-10-CM

## 2020-05-20 DIAGNOSIS — L97.512 NON-PRESSURE CHRONIC ULCER OF OTHER PART OF RIGHT FOOT WITH FAT LAYER EXPOSED (HCC): ICD-10-CM

## 2020-05-20 DIAGNOSIS — M86.9 DIABETIC FOOT ULCER WITH OSTEOMYELITIS (HCC): Primary | ICD-10-CM

## 2020-05-20 DIAGNOSIS — E11.621 DIABETIC FOOT ULCER WITH OSTEOMYELITIS (HCC): Primary | ICD-10-CM

## 2020-05-20 PROCEDURE — 82962 GLUCOSE BLOOD TEST: CPT

## 2020-05-20 PROCEDURE — 11042 DBRDMT SUBQ TIS 1ST 20SQCM/<: CPT

## 2020-05-20 NOTE — PROGRESS NOTES
Subjective    Chief Complaint  This information was obtained from the patient  Patient is here for a wound care follow up. He denies any pain or new wound concerns.     Allergies  No known allergies    HPI  This information was obtained from the patient, raza 5-13-20 patient returns today, states he has been wearing the dh walker, patient does have callus and undermining again just within the last week. we discussed that offloading is still the main issue. he will reschedule with dr daugherty for input.  we talk Wound #1 Right, Plantar Great Toe is a chronic Hall Grade 1 Diabetic Ulcer and has received a status of Not Healed.  Subsequent wound encounter measurements are 0.8cm length x 0.7cm width x 0.5cm depth, with an area of 0.56 sq cm and a volume of 0.28 cubi bp wnl for patient. Pulse Regular and wnl for patient. Flex Gaspervel Respirations easy and unlabored. Temperature wnl. Weight normal for height. . Appearance neat and clean. Appears in no acute distress. Well nourished and well developed.     Cardiovascular:  dp/pt palpa Wound #1 (Diabetic Ulcer) is located on the right, plantar great toe. A skin/subcutaneous tissue level excisional/surgical debridement with a total area debrided of 1.8 sq cm was performed by Isidro Mccoy NP.  Subcutaneous was removed along with devitali 1. There is a small ulceration along the plantar aspect of the left 1st digit at the a proximal IP joint space level. There is infiltration of the surrounding fat with granulation tissue noted adjacent to the distal phalanx of the right 1st digit.   There i

## 2020-05-27 ENCOUNTER — OFFICE VISIT (OUTPATIENT)
Dept: WOUND CARE | Facility: HOSPITAL | Age: 35
End: 2020-05-27
Attending: NURSE PRACTITIONER
Payer: MEDICARE

## 2020-05-27 DIAGNOSIS — M86.9 DIABETIC FOOT ULCER WITH OSTEOMYELITIS (HCC): Primary | ICD-10-CM

## 2020-05-27 DIAGNOSIS — E11.621 DIABETIC FOOT ULCER WITH OSTEOMYELITIS (HCC): Primary | ICD-10-CM

## 2020-05-27 DIAGNOSIS — E11.69 DIABETIC FOOT ULCER WITH OSTEOMYELITIS (HCC): Primary | ICD-10-CM

## 2020-05-27 DIAGNOSIS — L97.509 DIABETIC FOOT ULCER WITH OSTEOMYELITIS (HCC): Primary | ICD-10-CM

## 2020-05-27 PROCEDURE — 82962 GLUCOSE BLOOD TEST: CPT

## 2020-05-27 PROCEDURE — 99214 OFFICE O/P EST MOD 30 MIN: CPT

## 2020-05-27 NOTE — PROGRESS NOTES
Subjective    Chief Complaint  This information was obtained from the patient  Patient is here for a wound care follow up. He denies any pain or new wound concerns.     Allergies  No known allergies    HPI  This information was obtained from the patient, raza re-opening however he will get an opinion from dr daugherty first, because hbo will not address the offloading issue. 5-20-20 patient returns today, he has an appointment with dr Ruiz Living on june 19.  we discussed hbo again.   First words were \"I don't thi and has received a status of Not Healed. Subsequent wound encounter measurements are 1cm length x 0.9cm width x 0.3cm depth, with an area of 0.9 sq cm and a volume of 0.27 cubic cm. No tunneling has been noted. No sinus tract has been noted.  No undermining Diagnosis) M86.171 - Other acute osteomyelitis, right ankle and foot  (Encounter Diagnosis) E11.621 - Type 2 diabetes mellitus with foot ulcer  (Encounter Diagnosis) Z79.4 - Long term (current) use of insulin        Plan    Wound Orders:  Wound #1 Right, P Appointments:  A follow-up appointment should be scheduled. hbo reconsult next week.     Electronic Signature(s)  Signed By: Date:  Deacon TREJO, ERNESTO-GWENDOLYN, CFNATI, CSWS, [address] [place] [address],[address] [place] [address]'[facility] 05/27/2020 09:26:26       Entered By: Deacon Jin on 05/27/2020

## 2020-06-01 PROCEDURE — 99213 OFFICE O/P EST LOW 20 MIN: CPT

## 2020-06-03 ENCOUNTER — OFFICE VISIT (OUTPATIENT)
Dept: WOUND CARE | Facility: HOSPITAL | Age: 35
End: 2020-06-03
Attending: NURSE PRACTITIONER
Payer: MEDICARE

## 2020-06-03 DIAGNOSIS — R94.31 ABNORMAL EKG: Primary | ICD-10-CM

## 2020-06-03 DIAGNOSIS — L97.511 SKIN ULCER OF RIGHT FOOT, LIMITED TO BREAKDOWN OF SKIN (HCC): ICD-10-CM

## 2020-06-03 DIAGNOSIS — M86.9 DIABETIC FOOT ULCER WITH OSTEOMYELITIS (HCC): ICD-10-CM

## 2020-06-03 DIAGNOSIS — E11.621 DIABETIC FOOT ULCER WITH OSTEOMYELITIS (HCC): ICD-10-CM

## 2020-06-03 DIAGNOSIS — E11.69 DIABETIC FOOT ULCER WITH OSTEOMYELITIS (HCC): ICD-10-CM

## 2020-06-03 DIAGNOSIS — L97.512 NON-PRESSURE CHRONIC ULCER OF OTHER PART OF RIGHT FOOT WITH FAT LAYER EXPOSED (HCC): ICD-10-CM

## 2020-06-03 DIAGNOSIS — L97.509 DIABETIC FOOT ULCER WITH OSTEOMYELITIS (HCC): ICD-10-CM

## 2020-06-03 PROCEDURE — 99214 OFFICE O/P EST MOD 30 MIN: CPT

## 2020-06-03 PROCEDURE — 82962 GLUCOSE BLOOD TEST: CPT

## 2020-06-03 NOTE — PROGRESS NOTES
Chief Complaint  This information was obtained from the patient  Patient is here for a wound care follow up.  Patients denies any concern and no pain    Allergies  No known allergies  General Notes:  no allergies noted    HPI  This information was obtaine mid to distal diaphysis of the distal phalanx of the right 1st toe, findings would be consistent with some reactive bone marrow and probable early osteomyelitis. No abscess. Details above.     Dictated by: Arielle Castro MD on 2/18/2020 at 11:13     A in bc he put on his darco shoe after driving here to clinic.  the wound is essentially the same.  i did debride today and he will push back his appointment with dr. Miladis Beasley for a week or 2 to see if the boot is enough offloading to get the wound progressing Marital Status - single, Cultural, Restorationism, or Language Concerns - none, Lives in - home, Lives with - mom and sister    Medical History  This information was obtained from the patient, chart  Patient has a medical history of:  Diabetes  Neuropathy  Hype due to the wound being insensate. The wound margin is callus. Wound bed has % pink, firm granulation. The periwound skin exhibited: Callus, Dry/Scaly. The periwound skin did not exhibit: Edema, Erythema. The temperature of the periwound skin is WNL. Yes  Nutritional status optimized[de-identified] Yes  Infection addressed and treated[de-identified] Yes  Use of appropriate moist dressings to maintain clean wound[de-identified] Yes  Despite appropriate wound care, diabetic foot ulcer did not show healing in 4 consecutive weeks.  Based on the effects and side effects, HBO procedure, smoking/drug/alcohol policy, and items not allowed in the hyperbaric chamber.  : .  Written consent for HBO obtained[de-identified] No  A trained emergency response team and ICU is available in this facility to assist with compli

## 2020-06-05 ENCOUNTER — HOSPITAL ENCOUNTER (OUTPATIENT)
Dept: CV DIAGNOSTICS | Facility: HOSPITAL | Age: 35
End: 2020-06-05
Attending: INTERNAL MEDICINE
Payer: MEDICARE

## 2020-06-05 ENCOUNTER — EKG ENCOUNTER (OUTPATIENT)
Dept: LAB | Facility: HOSPITAL | Age: 35
End: 2020-06-05
Attending: INTERNAL MEDICINE
Payer: MEDICARE

## 2020-06-05 ENCOUNTER — HOSPITAL ENCOUNTER (OUTPATIENT)
Dept: GENERAL RADIOLOGY | Facility: HOSPITAL | Age: 35
Discharge: HOME | End: 2020-06-05
Attending: INTERNAL MEDICINE
Payer: MEDICARE

## 2020-06-05 DIAGNOSIS — L97.509 DIABETIC FOOT ULCER WITH OSTEOMYELITIS (HCC): ICD-10-CM

## 2020-06-05 DIAGNOSIS — M86.9 DIABETIC FOOT ULCER WITH OSTEOMYELITIS (HCC): ICD-10-CM

## 2020-06-05 DIAGNOSIS — E11.69 DIABETIC FOOT ULCER WITH OSTEOMYELITIS (HCC): ICD-10-CM

## 2020-06-05 DIAGNOSIS — E11.621 DIABETIC FOOT ULCER WITH OSTEOMYELITIS (HCC): ICD-10-CM

## 2020-06-05 PROCEDURE — 84134 ASSAY OF PREALBUMIN: CPT

## 2020-06-05 PROCEDURE — 85652 RBC SED RATE AUTOMATED: CPT

## 2020-06-05 PROCEDURE — 83036 HEMOGLOBIN GLYCOSYLATED A1C: CPT

## 2020-06-05 PROCEDURE — 86140 C-REACTIVE PROTEIN: CPT

## 2020-06-05 PROCEDURE — 93010 ELECTROCARDIOGRAM REPORT: CPT | Performed by: INTERNAL MEDICINE

## 2020-06-05 PROCEDURE — 93005 ELECTROCARDIOGRAM TRACING: CPT

## 2020-06-05 PROCEDURE — 85025 COMPLETE CBC W/AUTO DIFF WBC: CPT

## 2020-06-05 PROCEDURE — 80053 COMPREHEN METABOLIC PANEL: CPT

## 2020-06-10 ENCOUNTER — OFFICE VISIT (OUTPATIENT)
Dept: WOUND CARE | Facility: HOSPITAL | Age: 35
End: 2020-06-10
Attending: NURSE PRACTITIONER
Payer: MEDICARE

## 2020-06-10 DIAGNOSIS — L97.512 NON-PRESSURE CHRONIC ULCER OF OTHER PART OF RIGHT FOOT WITH FAT LAYER EXPOSED (HCC): Primary | ICD-10-CM

## 2020-06-10 DIAGNOSIS — L97.511 SKIN ULCER OF RIGHT FOOT, LIMITED TO BREAKDOWN OF SKIN (HCC): ICD-10-CM

## 2020-06-10 DIAGNOSIS — E11.621 DIABETIC FOOT ULCER WITH OSTEOMYELITIS (HCC): ICD-10-CM

## 2020-06-10 DIAGNOSIS — M86.9 DIABETIC FOOT ULCER WITH OSTEOMYELITIS (HCC): ICD-10-CM

## 2020-06-10 DIAGNOSIS — E11.69 DIABETIC FOOT ULCER WITH OSTEOMYELITIS (HCC): ICD-10-CM

## 2020-06-10 DIAGNOSIS — L97.509 DIABETIC FOOT ULCER WITH OSTEOMYELITIS (HCC): ICD-10-CM

## 2020-06-10 PROCEDURE — 97597 DBRDMT OPN WND 1ST 20 CM/<: CPT

## 2020-06-10 PROCEDURE — 82962 GLUCOSE BLOOD TEST: CPT

## 2020-06-10 NOTE — PROGRESS NOTES
Subjective    Chief Complaint  This information was obtained from the patient  Patient is here for a wound care follow up.  Patients denies any concern and no pain     Allergies  No known allergies  General Notes:  no allergies noted    HPI  This informatio Of note he has a history of end-stage renal disease with nephropathy has been on dialysis since September 2019, hypertension with hypertensive kidney disease, hypertension diabetic neuropathy, diabetic retinopathy.  Hemoglobin A1c done in September 2019 is Constitutional Symptoms (General Health):  Fatigue, Fever, Loss of Appetite, Marked Weight Change, Night Sweats, Chills  Respiratory: Cough, Shortness of Breath  Cardiovascular (Central/Peripheral): Chest Pain, Dyspnea on Exertion, Edema  Musculoskeletal: B dp/pt palpable right. right lower Extremity free of varicosities, clubbing or edema. Capillary refill < 3 seconds. Digits are warm on the right. toenails are wnl for color, thickness and hygeine, adequate length. + hairgrowth on legs.  .     Musculoskeletal Return Appointment in 1 week. - [facility]  Return Appointment in 2 weeks.     Misc/Additional Orders:  Supplement with a daily multivitamin  Increase protein into diet   Start or continue taking Nick   S/S of Infection    Additional Orders:    C

## 2020-06-13 ENCOUNTER — LAB ENCOUNTER (OUTPATIENT)
Dept: LAB | Facility: HOSPITAL | Age: 35
End: 2020-06-13
Attending: INTERNAL MEDICINE
Payer: MEDICARE

## 2020-06-13 DIAGNOSIS — R94.31 ABNORMAL EKG: ICD-10-CM

## 2020-06-16 ENCOUNTER — HOSPITAL ENCOUNTER (OUTPATIENT)
Dept: CV DIAGNOSTICS | Facility: HOSPITAL | Age: 35
Discharge: HOME | End: 2020-06-16
Attending: INTERNAL MEDICINE
Payer: MEDICARE

## 2020-06-16 DIAGNOSIS — R94.31 ABNORMAL EKG: ICD-10-CM

## 2020-06-16 PROCEDURE — 93306 TTE W/DOPPLER COMPLETE: CPT | Performed by: INTERNAL MEDICINE

## 2020-06-19 ENCOUNTER — OFFICE VISIT (OUTPATIENT)
Dept: PODIATRY CLINIC | Facility: CLINIC | Age: 35
End: 2020-06-19
Payer: MEDICARE

## 2020-06-19 VITALS — TEMPERATURE: 100 F

## 2020-06-19 DIAGNOSIS — L97.511 SKIN ULCER OF RIGHT FOOT, LIMITED TO BREAKDOWN OF SKIN (HCC): Primary | ICD-10-CM

## 2020-06-19 DIAGNOSIS — E11.42 DIABETIC PERIPHERAL NEUROPATHY (HCC): ICD-10-CM

## 2020-06-19 PROCEDURE — 99203 OFFICE O/P NEW LOW 30 MIN: CPT | Performed by: PODIATRIST

## 2020-06-19 NOTE — PROGRESS NOTES
Lisa Klein is a 38year old male. Patient presents with:  New Patient: FBS was not taken this am, A1C was done on 6/5/2020 with the result of 7.4, LOV with PCP was 2/2020. Right hallux ulcer for the past. Patient denies any pain.         HPI:   This Use      Smoking status: Never Smoker      Smokeless tobacco: Never Used    Substance and Sexual Activity      Alcohol use: No        Alcohol/week: 0.0 standard drinks      Drug use: No          REVIEW OF SYSTEMS:   Today reviewed systens as documented bel taken 3 views AP raised hallux lateral and medial oblique I did not notice any erosive changes to the bone which is a promising factor for hyperbaric if he has early osteomyelitis.   I will see him back in 2 weeks I gave him a note to be off of work for 6 w

## 2020-06-22 ENCOUNTER — OFFICE VISIT (OUTPATIENT)
Dept: WOUND CARE | Facility: HOSPITAL | Age: 35
End: 2020-06-22
Attending: INTERNAL MEDICINE
Payer: MEDICARE

## 2020-06-22 DIAGNOSIS — E11.621 DIABETIC FOOT ULCER WITH OSTEOMYELITIS (HCC): Primary | ICD-10-CM

## 2020-06-22 DIAGNOSIS — L97.509 DIABETIC FOOT ULCER WITH OSTEOMYELITIS (HCC): Primary | ICD-10-CM

## 2020-06-22 DIAGNOSIS — M86.9 DIABETIC FOOT ULCER WITH OSTEOMYELITIS (HCC): Primary | ICD-10-CM

## 2020-06-22 DIAGNOSIS — E11.69 DIABETIC FOOT ULCER WITH OSTEOMYELITIS (HCC): Primary | ICD-10-CM

## 2020-06-22 PROCEDURE — 82962 GLUCOSE BLOOD TEST: CPT

## 2020-06-22 NOTE — PROGRESS NOTES
HBO Tech Details      Patient Name: Clementine Elliott    Patient Number: [id number]   Patient YOB: 1985      Date: 6/22/2020   Physician / Elida You: Aryan Pham, [address] [place] [address] Street: [facility] Treatment Course Details          Treatmen

## 2020-06-23 ENCOUNTER — OFFICE VISIT (OUTPATIENT)
Dept: WOUND CARE | Facility: HOSPITAL | Age: 35
End: 2020-06-23
Attending: INTERNAL MEDICINE
Payer: MEDICARE

## 2020-06-23 DIAGNOSIS — L97.512 NON-PRESSURE CHRONIC ULCER OF OTHER PART OF RIGHT FOOT WITH FAT LAYER EXPOSED (HCC): ICD-10-CM

## 2020-06-23 DIAGNOSIS — L97.511 SKIN ULCER OF RIGHT FOOT, LIMITED TO BREAKDOWN OF SKIN (HCC): ICD-10-CM

## 2020-06-23 DIAGNOSIS — M86.9 DIABETIC FOOT ULCER WITH OSTEOMYELITIS (HCC): Primary | ICD-10-CM

## 2020-06-23 DIAGNOSIS — L97.509 DIABETIC FOOT ULCER WITH OSTEOMYELITIS (HCC): Primary | ICD-10-CM

## 2020-06-23 DIAGNOSIS — E11.621 DIABETIC FOOT ULCER WITH OSTEOMYELITIS (HCC): Primary | ICD-10-CM

## 2020-06-23 DIAGNOSIS — E11.69 DIABETIC FOOT ULCER WITH OSTEOMYELITIS (HCC): Primary | ICD-10-CM

## 2020-06-23 PROCEDURE — 82962 GLUCOSE BLOOD TEST: CPT

## 2020-06-23 NOTE — PROGRESS NOTES
HBO Tech Details      Patient Name: Christine Cates    Patient Number: [id number]   Patient YOB: 1985      Date: 6/23/2020   Physician / Sammy Salvage: Janeen Likes: Thomas Jaramillo Treatment Course Details          Treatment

## 2020-06-24 ENCOUNTER — OFFICE VISIT (OUTPATIENT)
Dept: WOUND CARE | Facility: HOSPITAL | Age: 35
End: 2020-06-24
Attending: NURSE PRACTITIONER
Payer: MEDICARE

## 2020-06-24 DIAGNOSIS — L97.511 SKIN ULCER OF RIGHT FOOT, LIMITED TO BREAKDOWN OF SKIN (HCC): ICD-10-CM

## 2020-06-24 DIAGNOSIS — M86.9 DIABETIC FOOT ULCER WITH OSTEOMYELITIS (HCC): Primary | ICD-10-CM

## 2020-06-24 DIAGNOSIS — L97.512 NON-PRESSURE CHRONIC ULCER OF OTHER PART OF RIGHT FOOT WITH FAT LAYER EXPOSED (HCC): ICD-10-CM

## 2020-06-24 DIAGNOSIS — E11.621 DIABETIC FOOT ULCER WITH OSTEOMYELITIS (HCC): Primary | ICD-10-CM

## 2020-06-24 DIAGNOSIS — L97.509 DIABETIC FOOT ULCER WITH OSTEOMYELITIS (HCC): Primary | ICD-10-CM

## 2020-06-24 DIAGNOSIS — E11.69 DIABETIC FOOT ULCER WITH OSTEOMYELITIS (HCC): Primary | ICD-10-CM

## 2020-06-24 PROCEDURE — 87205 SMEAR GRAM STAIN: CPT

## 2020-06-24 PROCEDURE — 87186 SC STD MICRODIL/AGAR DIL: CPT

## 2020-06-24 PROCEDURE — 87070 CULTURE OTHR SPECIMN AEROBIC: CPT

## 2020-06-24 PROCEDURE — 82962 GLUCOSE BLOOD TEST: CPT

## 2020-06-24 PROCEDURE — 99214 OFFICE O/P EST MOD 30 MIN: CPT

## 2020-06-24 PROCEDURE — 87077 CULTURE AEROBIC IDENTIFY: CPT

## 2020-06-24 NOTE — PROGRESS NOTES
HBO Tech Details      Patient Name: Lucas Torres    Patient Number: [id number]   Patient YOB: 1985      Date: 6/24/2020   Physician / Mabel : Jet Perry, [address]: [facility] Treatment Course Details          Treatmen

## 2020-06-24 NOTE — PROGRESS NOTES
Subjective    Chief Complaint  This information was obtained from the patient  Patient is here for a wound care follow up. He complains of more drainage than previously. He does present with a new wound that communicates with the old one.     Allergies  No Of note he has a history of end-stage renal disease with nephropathy has been on dialysis since September 2019, hypertension with hypertensive kidney disease, hypertension diabetic neuropathy, diabetic retinopathy.  Hemoglobin A1c done in September 2019 is 6-24-20 patient returns today, he saw dr. Jones Crespo last friday (5 days ago) and dr. Jones Crespo recommended he stay off of work-which patient states he has. patient started hbo on monday and today will be his third dive.  he states he noticed the new wound on m Wound #1 Right, Plantar Great Toe is a chronic Hall Grade 3 Diabetic Ulcer and has received a status of Bridged. Subsequent wound encounter measurements are 1.6cm length x 5cm width x 0.4cm depth, with an area of 8 sq cm and a volume of 3.2 cubic cm.  Nec dp/pt palpable right. right lower Extremity free of varicosities, clubbing or edema. Capillary refill < 3 seconds. Digits are warm on the right. toenails are wnl for color, thickness and hygeine, adequate length. + hairgrowth on legs. .    Musculoskeletal: 1. There is a small ulceration along the plantar aspect of the left 1st digit at the a proximal IP joint space level. There is infiltration of the surrounding fat with granulation tissue noted adjacent to the distal phalanx of the right 1st digit.   There i

## 2020-06-25 ENCOUNTER — OFFICE VISIT (OUTPATIENT)
Dept: WOUND CARE | Facility: HOSPITAL | Age: 35
End: 2020-06-25
Attending: INTERNAL MEDICINE
Payer: MEDICARE

## 2020-06-25 DIAGNOSIS — E11.621 DIABETIC FOOT ULCER WITH OSTEOMYELITIS (HCC): Primary | ICD-10-CM

## 2020-06-25 DIAGNOSIS — L97.511 SKIN ULCER OF RIGHT FOOT, LIMITED TO BREAKDOWN OF SKIN (HCC): ICD-10-CM

## 2020-06-25 DIAGNOSIS — L97.512 NON-PRESSURE CHRONIC ULCER OF OTHER PART OF RIGHT FOOT WITH FAT LAYER EXPOSED (HCC): ICD-10-CM

## 2020-06-25 DIAGNOSIS — L97.509 DIABETIC FOOT ULCER WITH OSTEOMYELITIS (HCC): Primary | ICD-10-CM

## 2020-06-25 DIAGNOSIS — M86.9 DIABETIC FOOT ULCER WITH OSTEOMYELITIS (HCC): Primary | ICD-10-CM

## 2020-06-25 DIAGNOSIS — E11.69 DIABETIC FOOT ULCER WITH OSTEOMYELITIS (HCC): Primary | ICD-10-CM

## 2020-06-25 PROCEDURE — 82962 GLUCOSE BLOOD TEST: CPT

## 2020-06-25 NOTE — PROGRESS NOTES
HBO Tech Details      Patient Name: Emiliana Stuart    Patient Number: [id number]   Patient YOB: 1985      Date: 6/25/2020   Physician / Meryl Carver: Laurie Werner: Thomas Jaramillo Treatment Course Details          Treatment

## 2020-06-26 ENCOUNTER — OFFICE VISIT (OUTPATIENT)
Dept: WOUND CARE | Facility: HOSPITAL | Age: 35
End: 2020-06-26
Attending: INTERNAL MEDICINE
Payer: MEDICARE

## 2020-06-26 DIAGNOSIS — L97.512 NON-PRESSURE CHRONIC ULCER OF OTHER PART OF RIGHT FOOT WITH FAT LAYER EXPOSED (HCC): ICD-10-CM

## 2020-06-26 DIAGNOSIS — E11.621 DIABETIC FOOT ULCER WITH OSTEOMYELITIS (HCC): Primary | ICD-10-CM

## 2020-06-26 DIAGNOSIS — E11.69 DIABETIC FOOT ULCER WITH OSTEOMYELITIS (HCC): Primary | ICD-10-CM

## 2020-06-26 DIAGNOSIS — M86.9 DIABETIC FOOT ULCER WITH OSTEOMYELITIS (HCC): Primary | ICD-10-CM

## 2020-06-26 DIAGNOSIS — L97.509 DIABETIC FOOT ULCER WITH OSTEOMYELITIS (HCC): Primary | ICD-10-CM

## 2020-06-26 PROCEDURE — 82962 GLUCOSE BLOOD TEST: CPT

## 2020-06-26 NOTE — PROGRESS NOTES
[facility] Tech Details      Patient Name: Nicolás Woods    Patient Number: [id number]   Patient YOB: 1985      Date: 6/26/2020   Physician / Lisha Covington: Bobbi Rosario, [address] [place] [address]: [facility] Treatment Course Details          Treatmen

## 2020-06-29 ENCOUNTER — OFFICE VISIT (OUTPATIENT)
Dept: WOUND CARE | Facility: HOSPITAL | Age: 35
End: 2020-06-29
Attending: INTERNAL MEDICINE
Payer: MEDICARE

## 2020-06-29 DIAGNOSIS — L97.509 DIABETIC FOOT ULCER WITH OSTEOMYELITIS (HCC): Primary | ICD-10-CM

## 2020-06-29 DIAGNOSIS — L97.512 NON-PRESSURE CHRONIC ULCER OF OTHER PART OF RIGHT FOOT WITH FAT LAYER EXPOSED (HCC): ICD-10-CM

## 2020-06-29 DIAGNOSIS — E11.621 DIABETIC FOOT ULCER WITH OSTEOMYELITIS (HCC): Primary | ICD-10-CM

## 2020-06-29 DIAGNOSIS — M86.9 DIABETIC FOOT ULCER WITH OSTEOMYELITIS (HCC): Primary | ICD-10-CM

## 2020-06-29 DIAGNOSIS — E11.69 DIABETIC FOOT ULCER WITH OSTEOMYELITIS (HCC): Primary | ICD-10-CM

## 2020-06-29 PROCEDURE — 82962 GLUCOSE BLOOD TEST: CPT

## 2020-06-29 NOTE — PROGRESS NOTES
HBO Tech Details      Patient Name: Helena Rogers    Patient Number: [id number]   Patient YOB: 1985      Date: 6/29/2020   Physician / Wilma Crawford: Marylu Huff, [address] [place] [address] Street: [facility] Treatment Course Details          Treatmen

## 2020-06-30 ENCOUNTER — OFFICE VISIT (OUTPATIENT)
Dept: WOUND CARE | Facility: HOSPITAL | Age: 35
End: 2020-06-30
Attending: INTERNAL MEDICINE
Payer: MEDICARE

## 2020-06-30 DIAGNOSIS — E11.621 DIABETIC FOOT ULCER WITH OSTEOMYELITIS (HCC): Primary | ICD-10-CM

## 2020-06-30 DIAGNOSIS — M86.9 DIABETIC FOOT ULCER WITH OSTEOMYELITIS (HCC): Primary | ICD-10-CM

## 2020-06-30 DIAGNOSIS — E11.69 DIABETIC FOOT ULCER WITH OSTEOMYELITIS (HCC): Primary | ICD-10-CM

## 2020-06-30 DIAGNOSIS — L97.509 DIABETIC FOOT ULCER WITH OSTEOMYELITIS (HCC): Primary | ICD-10-CM

## 2020-06-30 DIAGNOSIS — L97.512 NON-PRESSURE CHRONIC ULCER OF OTHER PART OF RIGHT FOOT WITH FAT LAYER EXPOSED (HCC): ICD-10-CM

## 2020-06-30 PROCEDURE — 82962 GLUCOSE BLOOD TEST: CPT

## 2020-06-30 NOTE — PROGRESS NOTES
HBO Tech Details      Patient Name: Rowan Posada    Patient Number: [id number]   Patient YOB: 1985      Date: 6/30/2020   Physician / Ilana Ear: Siobhan Vu, [address] [facility] [address]: [facility] Treatment Course Details          Treatment C

## 2020-07-01 ENCOUNTER — APPOINTMENT (OUTPATIENT)
Dept: WOUND CARE | Facility: HOSPITAL | Age: 35
DRG: 638 | End: 2020-07-01
Attending: INTERNAL MEDICINE
Payer: MEDICARE

## 2020-07-02 ENCOUNTER — APPOINTMENT (OUTPATIENT)
Dept: GENERAL RADIOLOGY | Facility: HOSPITAL | Age: 35
DRG: 638 | End: 2020-07-02
Attending: PODIATRIST
Payer: MEDICARE

## 2020-07-02 ENCOUNTER — OFFICE VISIT (OUTPATIENT)
Dept: WOUND CARE | Facility: HOSPITAL | Age: 35
DRG: 638 | End: 2020-07-02
Attending: INTERNAL MEDICINE
Payer: MEDICARE

## 2020-07-02 ENCOUNTER — HOSPITAL ENCOUNTER (INPATIENT)
Facility: HOSPITAL | Age: 35
LOS: 1 days | Discharge: LEFT BEFORE DISCHARGE | DRG: 638 | End: 2020-07-02
Attending: INTERNAL MEDICINE | Admitting: INTERNAL MEDICINE
Payer: MEDICARE

## 2020-07-02 ENCOUNTER — HOSPITAL ENCOUNTER (INPATIENT)
Facility: HOSPITAL | Age: 35
LOS: 3 days | Discharge: HOME | DRG: 255 | End: 2020-07-06
Attending: EMERGENCY MEDICINE | Admitting: HOSPITALIST
Payer: MEDICARE

## 2020-07-02 VITALS
HEART RATE: 95 BPM | DIASTOLIC BLOOD PRESSURE: 83 MMHG | TEMPERATURE: 98 F | SYSTOLIC BLOOD PRESSURE: 163 MMHG | RESPIRATION RATE: 18 BRPM | OXYGEN SATURATION: 100 % | BODY MASS INDEX: 26 KG/M2 | WEIGHT: 191.69 LBS

## 2020-07-02 DIAGNOSIS — E11.621 DIABETIC FOOT ULCER WITH OSTEOMYELITIS (HCC): Primary | ICD-10-CM

## 2020-07-02 DIAGNOSIS — L97.509 DIABETIC FOOT ULCER WITH OSTEOMYELITIS (HCC): Primary | ICD-10-CM

## 2020-07-02 DIAGNOSIS — M86.9 OSTEOMYELITIS (HCC): ICD-10-CM

## 2020-07-02 DIAGNOSIS — L08.9 DIABETIC FOOT INFECTION (HCC): Primary | ICD-10-CM

## 2020-07-02 DIAGNOSIS — E11.628 DIABETIC FOOT INFECTION (HCC): Primary | ICD-10-CM

## 2020-07-02 DIAGNOSIS — M86.9 DIABETIC FOOT ULCER WITH OSTEOMYELITIS (HCC): Primary | ICD-10-CM

## 2020-07-02 DIAGNOSIS — L97.512 NON-PRESSURE CHRONIC ULCER OF OTHER PART OF RIGHT FOOT WITH FAT LAYER EXPOSED (HCC): ICD-10-CM

## 2020-07-02 DIAGNOSIS — E11.69 DIABETIC FOOT ULCER WITH OSTEOMYELITIS (HCC): Primary | ICD-10-CM

## 2020-07-02 PROBLEM — E87.1 HYPONATREMIA: Status: ACTIVE | Noted: 2020-07-02

## 2020-07-02 PROBLEM — E87.6 HYPOKALEMIA: Status: ACTIVE | Noted: 2020-07-02

## 2020-07-02 PROBLEM — N17.9 ACUTE KIDNEY INJURY: Status: ACTIVE | Noted: 2020-07-02

## 2020-07-02 PROBLEM — N17.9 ACUTE RENAL FAILURE (ARF) (HCC): Status: ACTIVE | Noted: 2020-07-02

## 2020-07-02 PROBLEM — D72.829 LEUKOCYTOSIS: Status: ACTIVE | Noted: 2020-07-02

## 2020-07-02 PROBLEM — N17.9 ACUTE RENAL FAILURE (ARF): Status: ACTIVE | Noted: 2020-07-02

## 2020-07-02 PROBLEM — N17.9 ACUTE KIDNEY INJURY (HCC): Status: ACTIVE | Noted: 2020-07-02

## 2020-07-02 PROBLEM — E87.3 METABOLIC ALKALOSIS: Status: ACTIVE | Noted: 2020-07-02

## 2020-07-02 PROBLEM — D64.9 ANEMIA: Status: ACTIVE | Noted: 2020-07-02

## 2020-07-02 PROBLEM — R73.9 HYPERGLYCEMIA: Status: ACTIVE | Noted: 2020-07-02

## 2020-07-02 LAB
ALBUMIN SERPL-MCNC: 2.8 G/DL (ref 3.4–5)
ALBUMIN/GLOB SERPL: 0.6 {RATIO} (ref 1–2)
ALP LIVER SERPL-CCNC: 94 U/L (ref 45–117)
ALT SERPL-CCNC: 52 U/L (ref 16–61)
ANION GAP SERPL CALC-SCNC: 6 MMOL/L (ref 0–18)
AST SERPL-CCNC: 50 U/L (ref 15–37)
BASOPHILS # BLD AUTO: 0.04 X10(3) UL (ref 0–0.2)
BASOPHILS NFR BLD AUTO: 0.3 %
BILIRUB SERPL-MCNC: 0.5 MG/DL (ref 0.1–2)
BUN BLD-MCNC: 33 MG/DL (ref 7–18)
BUN/CREAT SERPL: 4.4 (ref 10–20)
CALCIUM BLD-MCNC: 9.1 MG/DL (ref 8.5–10.1)
CHLORIDE SERPL-SCNC: 96 MMOL/L (ref 98–112)
CO2 SERPL-SCNC: 33 MMOL/L (ref 21–32)
CREAT BLD-MCNC: 7.48 MG/DL (ref 0.7–1.3)
DEPRECATED RDW RBC AUTO: 50.2 FL (ref 35.1–46.3)
EOSINOPHIL # BLD AUTO: 0.03 X10(3) UL (ref 0–0.7)
EOSINOPHIL NFR BLD AUTO: 0.2 %
ERYTHROCYTE [DISTWIDTH] IN BLOOD BY AUTOMATED COUNT: 14.9 % (ref 11–15)
GLOBULIN PLAS-MCNC: 4.8 G/DL (ref 2.8–4.4)
GLUCOSE BLD-MCNC: 141 MG/DL (ref 70–99)
GLUCOSE BLD-MCNC: 143 MG/DL (ref 70–99)
GLUCOSE BLD-MCNC: 185 MG/DL (ref 70–99)
HCT VFR BLD AUTO: 28.7 % (ref 39–53)
HGB BLD-MCNC: 8.9 G/DL (ref 13–17.5)
IMM GRANULOCYTES # BLD AUTO: 0.07 X10(3) UL (ref 0–1)
IMM GRANULOCYTES NFR BLD: 0.5 %
LYMPHOCYTES # BLD AUTO: 1.08 X10(3) UL (ref 1–4)
LYMPHOCYTES NFR BLD AUTO: 7.2 %
M PROTEIN MFR SERPL ELPH: 7.6 G/DL (ref 6.4–8.2)
MCH RBC QN AUTO: 28.5 PG (ref 26–34)
MCHC RBC AUTO-ENTMCNC: 31 G/DL (ref 31–37)
MCV RBC AUTO: 92 FL (ref 80–100)
MONOCYTES # BLD AUTO: 1.08 X10(3) UL (ref 0.1–1)
MONOCYTES NFR BLD AUTO: 7.2 %
NEUTROPHILS # BLD AUTO: 12.65 X10 (3) UL (ref 1.5–7.7)
NEUTROPHILS # BLD AUTO: 12.65 X10(3) UL (ref 1.5–7.7)
NEUTROPHILS NFR BLD AUTO: 84.6 %
OSMOLALITY SERPL CALC.SUM OF ELEC: 290 MOSM/KG (ref 275–295)
PLATELET # BLD AUTO: 377 10(3)UL (ref 150–450)
POTASSIUM SERPL-SCNC: 3.4 MMOL/L (ref 3.5–5.1)
RBC # BLD AUTO: 3.12 X10(6)UL (ref 4.3–5.7)
SARS-COV-2 RNA RESP QL NAA+PROBE: NOT DETECTED
SED RATE-ML: 83 MM/HR (ref 0–12)
SODIUM SERPL-SCNC: 135 MMOL/L (ref 136–145)
WBC # BLD AUTO: 15 X10(3) UL (ref 4–11)

## 2020-07-02 PROCEDURE — 99223 1ST HOSP IP/OBS HIGH 75: CPT | Performed by: INTERNAL MEDICINE

## 2020-07-02 PROCEDURE — 99221 1ST HOSP IP/OBS SF/LOW 40: CPT | Performed by: PODIATRIST

## 2020-07-02 PROCEDURE — 82962 GLUCOSE BLOOD TEST: CPT

## 2020-07-02 PROCEDURE — 99214 OFFICE O/P EST MOD 30 MIN: CPT

## 2020-07-02 RX ORDER — ONDANSETRON 2 MG/ML
4 INJECTION INTRAMUSCULAR; INTRAVENOUS EVERY 6 HOURS PRN
Status: DISCONTINUED | OUTPATIENT
Start: 2020-07-02 | End: 2020-07-02

## 2020-07-02 RX ORDER — HEPARIN SODIUM 5000 [USP'U]/ML
5000 INJECTION, SOLUTION INTRAVENOUS; SUBCUTANEOUS EVERY 8 HOURS SCHEDULED
Status: DISCONTINUED | OUTPATIENT
Start: 2020-07-02 | End: 2020-07-02

## 2020-07-02 RX ORDER — ACETAMINOPHEN 325 MG/1
650 TABLET ORAL EVERY 6 HOURS PRN
Status: DISCONTINUED | OUTPATIENT
Start: 2020-07-02 | End: 2020-07-02

## 2020-07-02 RX ORDER — DEXTROSE MONOHYDRATE 25 G/50ML
50 INJECTION, SOLUTION INTRAVENOUS
Status: DISCONTINUED | OUTPATIENT
Start: 2020-07-02 | End: 2020-07-02

## 2020-07-02 RX ORDER — METOCLOPRAMIDE HYDROCHLORIDE 5 MG/ML
5 INJECTION INTRAMUSCULAR; INTRAVENOUS EVERY 8 HOURS PRN
Status: DISCONTINUED | OUTPATIENT
Start: 2020-07-02 | End: 2020-07-02

## 2020-07-02 RX ORDER — AMLODIPINE BESYLATE 10 MG/1
20 TABLET ORAL DAILY
Status: DISCONTINUED | OUTPATIENT
Start: 2020-07-02 | End: 2020-07-02

## 2020-07-02 NOTE — PLAN OF CARE
Pt A&O. On room air. Limb alert to left arm d/t left A-V fistula for HD. Pt has HD M-W-F. Ordered for tomorrow. Dly standing wt completed. Scds to BLE. Tolerating ADA diet. Blood sugars monitored as ordered. Pt states he still voids normal amounts urine.  L

## 2020-07-02 NOTE — PROGRESS NOTES
Subjective    Chief Complaint  This information was obtained from the patient  Patient is here for a wound care follow up. He denies any pain to the wound. He also states that the drainage has lessened.     Allergies  No known allergies  General Notes:  no Of note he has a history of end-stage renal disease with nephropathy has been on dialysis since September 2019, hypertension with hypertensive kidney disease, hypertension diabetic neuropathy, diabetic retinopathy.  Hemoglobin A1c done in September 2019 is 6-24-20 patient returns today, he saw dr. Davion Brink last friday (5 days ago) and dr. aDvion Brink recommended he stay off of work-which patient states he has. patient started hbo on monday and today will be his third dive.  he states he noticed the new wound on m Wound #1 Right, Plantar Great Toe is a chronic Hall Grade 3 Diabetic Ulcer and has received a status of Bridged.  Subsequent wound encounter measurements are 1.2cm length x 4.5cm width x 0.7cm depth, with an area of 5.4 sq cm and a volume of 3.78 cubic cm dp/pt palpable right. right lower Extremity free of varicosities, patient's hallux is very edematous, with blistering across distal tip. Capillary refill < 3 seconds. Digits are warm on the right.  toenails are wnl for color, thickness and hygeine, adequate

## 2020-07-02 NOTE — PROGRESS NOTES
Pt very tearful and anxious regarding taking care of mother and sister. Pt asking if he can leave for a little while and come back, so he can continue to try and make arrangements for his family.  Explained that once he is admitted, he cannot leave and come

## 2020-07-02 NOTE — H&P
[facility]  History and Physical     Lattie Gist Patient Status:  Inpatient    1985 MRN [id number]   [facility] 3SW-A Attending Joyce Erickson, DO   Hosp Day # 0 PCP Dalton Leiva     Chief Complaint: Diabetic foot ulcer Take 20 mg by mouth daily. , Disp: , Rfl:   calcium acetate 667 MG Oral Cap, Take 2 capsules by mouth 2 (two) times daily. , Disp: , Rfl:   insulin glargine 100 UNIT/ML Subcutaneous Solution, Inject 12 Units into the skin nightly., Disp: , Rfl:   UNKNOWN TO calculated (Patient's most recent lab result is older than the maximum 7 days allowed. ). No results for input(s): PTP, INR in the last 168 hours. No results for input(s): TROP, CK in the last 168 hours. Imaging: Imaging data reviewed in Epic.

## 2020-07-02 NOTE — CONSULTS
INFECTIOUS DISEASE CONSULTATION    Ricardo Naik Patient Status:  Inpatient    1985 MRN [id number]   [facility] 3SW-A Attending Eriberto Smith, DO   Hosp Day # 0 PCP Ángel Dietrich (NOVOLOG) 100 UNIT/ML flexpen 2-10 Units, 2-10 Units, Subcutaneous, TID CC and HS  •  amLODIPine Besylate (NORVASC) tab 20 mg, 20 mg, Oral, Daily  •  [START ON 7/3/2020] epoetin sophia-epbx (RETACRIT) 20502 UNIT/ML injection 10,000 Units, 10,000 Units, Intra 28.7*   MCV 92.0   MCH 28.5   MCHC 31.0   RDW 14.9   NEPRELIM 12.65*   WBC 15.0*   .0       Recent Labs   Lab 07/02/20  1232   *   BUN 33*   CREATSERUM 7.48*   GFRAA 10*   GFRNAA 8*   CA 9.1   ALB 2.8*   *   K 3.4*   CL 96*   CO2 33.0*

## 2020-07-02 NOTE — CONSULTS
[facility]  Report of Consultation    Ele Soto Patient Status:  Inpatient    1985 MRN [id number]   [facility] 3SW-A Attending Rebel Dominguez,    Hosp Day # 0 PCP Stacy Opitz     Date of Admission:  2020  Date of Consu Subcutaneous, Q8H [address]  •  acetaminophen (TYLENOL) tab 650 mg, 650 mg, Oral, Q6H PRN  •  ondansetron HCl (ZOFRAN) injection 4 mg, 4 mg, Intravenous, Q6H PRN  •  Metoclopramide HCl (REGLAN) injection 5 mg, 5 mg, Intravenous, Q8H PRN  •  Insulin Aspart Pen (N PGLU 141 07/02/2020       Impression and Plan:  Patient Active Problem List:     Back pain     Left leg weakness     Skin ulcer of right foot, limited to breakdown of skin ([address]     Diabetic foot ulcer with osteomyelitis ([address]     Diabetic foot infectio

## 2020-07-03 ENCOUNTER — ANESTHESIA (OUTPATIENT)
Dept: SURGERY | Facility: HOSPITAL | Age: 35
DRG: 255 | End: 2020-07-03
Payer: MEDICARE

## 2020-07-03 ENCOUNTER — ANESTHESIA EVENT (OUTPATIENT)
Dept: SURGERY | Facility: HOSPITAL | Age: 35
DRG: 255 | End: 2020-07-03
Payer: MEDICARE

## 2020-07-03 ENCOUNTER — APPOINTMENT (OUTPATIENT)
Dept: WOUND CARE | Facility: HOSPITAL | Age: 35
DRG: 638 | End: 2020-07-03
Attending: INTERNAL MEDICINE
Payer: MEDICARE

## 2020-07-03 ENCOUNTER — APPOINTMENT (OUTPATIENT)
Dept: GENERAL RADIOLOGY | Facility: HOSPITAL | Age: 35
DRG: 255 | End: 2020-07-03
Attending: PODIATRIST
Payer: MEDICARE

## 2020-07-03 PROBLEM — M86.9 OSTEOMYELITIS (HCC): Status: ACTIVE | Noted: 2020-07-03

## 2020-07-03 LAB
ANION GAP SERPL CALC-SCNC: 8 MMOL/L (ref 0–18)
BASOPHILS # BLD AUTO: 0.06 X10(3) UL (ref 0–0.2)
BASOPHILS NFR BLD AUTO: 0.4 %
BUN BLD-MCNC: 41 MG/DL (ref 7–18)
BUN/CREAT SERPL: 4.9 (ref 10–20)
CALCIUM BLD-MCNC: 8.4 MG/DL (ref 8.5–10.1)
CHLORIDE SERPL-SCNC: 98 MMOL/L (ref 98–112)
CO2 SERPL-SCNC: 31 MMOL/L (ref 21–32)
CREAT BLD-MCNC: 8.35 MG/DL (ref 0.7–1.3)
DEPRECATED RDW RBC AUTO: 51.8 FL (ref 35.1–46.3)
EOSINOPHIL # BLD AUTO: 0.05 X10(3) UL (ref 0–0.7)
EOSINOPHIL NFR BLD AUTO: 0.4 %
ERYTHROCYTE [DISTWIDTH] IN BLOOD BY AUTOMATED COUNT: 15.1 % (ref 11–15)
GLUCOSE BLD-MCNC: 103 MG/DL (ref 70–99)
GLUCOSE BLD-MCNC: 107 MG/DL (ref 70–99)
GLUCOSE BLD-MCNC: 117 MG/DL (ref 70–99)
GLUCOSE BLD-MCNC: 158 MG/DL (ref 70–99)
GLUCOSE BLD-MCNC: 292 MG/DL (ref 70–99)
GLUCOSE BLD-MCNC: 99 MG/DL (ref 70–99)
HCT VFR BLD AUTO: 26.9 % (ref 39–53)
HGB BLD-MCNC: 8.3 G/DL (ref 13–17.5)
IMM GRANULOCYTES # BLD AUTO: 0.06 X10(3) UL (ref 0–1)
IMM GRANULOCYTES NFR BLD: 0.4 %
LYMPHOCYTES # BLD AUTO: 1.44 X10(3) UL (ref 1–4)
LYMPHOCYTES NFR BLD AUTO: 10.8 %
MCH RBC QN AUTO: 29.2 PG (ref 26–34)
MCHC RBC AUTO-ENTMCNC: 30.9 G/DL (ref 31–37)
MCV RBC AUTO: 94.7 FL (ref 80–100)
MONOCYTES # BLD AUTO: 0.95 X10(3) UL (ref 0.1–1)
MONOCYTES NFR BLD AUTO: 7.1 %
NEUTROPHILS # BLD AUTO: 10.81 X10 (3) UL (ref 1.5–7.7)
NEUTROPHILS # BLD AUTO: 10.81 X10(3) UL (ref 1.5–7.7)
NEUTROPHILS NFR BLD AUTO: 80.9 %
OSMOLALITY SERPL CALC.SUM OF ELEC: 295 MOSM/KG (ref 275–295)
PLATELET # BLD AUTO: 385 10(3)UL (ref 150–450)
POTASSIUM SERPL-SCNC: 3.6 MMOL/L (ref 3.5–5.1)
RBC # BLD AUTO: 2.84 X10(6)UL (ref 4.3–5.7)
SODIUM SERPL-SCNC: 137 MMOL/L (ref 136–145)
WBC # BLD AUTO: 13.4 X10(3) UL (ref 4–11)

## 2020-07-03 PROCEDURE — 0Y6P0Z0 DETACHMENT AT RIGHT 1ST TOE, COMPLETE, OPEN APPROACH: ICD-10-PCS | Performed by: PODIATRIST

## 2020-07-03 PROCEDURE — 99232 SBSQ HOSP IP/OBS MODERATE 35: CPT | Performed by: HOSPITALIST

## 2020-07-03 PROCEDURE — 5A1D70Z PERFORMANCE OF URINARY FILTRATION, INTERMITTENT, LESS THAN 6 HOURS PER DAY: ICD-10-PCS | Performed by: INTERNAL MEDICINE

## 2020-07-03 PROCEDURE — 73630 X-RAY EXAM OF FOOT: CPT | Performed by: PODIATRIST

## 2020-07-03 RX ORDER — SODIUM CHLORIDE, SODIUM LACTATE, POTASSIUM CHLORIDE, CALCIUM CHLORIDE 600; 310; 30; 20 MG/100ML; MG/100ML; MG/100ML; MG/100ML
INJECTION, SOLUTION INTRAVENOUS CONTINUOUS
Status: DISCONTINUED | OUTPATIENT
Start: 2020-07-03 | End: 2020-07-03 | Stop reason: HOSPADM

## 2020-07-03 RX ORDER — MORPHINE SULFATE 4 MG/ML
2 INJECTION, SOLUTION INTRAMUSCULAR; INTRAVENOUS EVERY 2 HOUR PRN
Status: DISCONTINUED | OUTPATIENT
Start: 2020-07-03 | End: 2020-07-06

## 2020-07-03 RX ORDER — VANCOMYCIN 2 GRAM/500 ML IN 0.9 % SODIUM CHLORIDE INTRAVENOUS
25 EVERY 12 HOURS
Status: DISCONTINUED | OUTPATIENT
Start: 2020-07-03 | End: 2020-07-03 | Stop reason: DRUGHIGH

## 2020-07-03 RX ORDER — LIDOCAINE HYDROCHLORIDE 10 MG/ML
INJECTION, SOLUTION EPIDURAL; INFILTRATION; INTRACAUDAL; PERINEURAL AS NEEDED
Status: DISCONTINUED | OUTPATIENT
Start: 2020-07-03 | End: 2020-07-03 | Stop reason: SURG

## 2020-07-03 RX ORDER — BACITRACIN 50000 [USP'U]/1
INJECTION, POWDER, LYOPHILIZED, FOR SOLUTION INTRAMUSCULAR AS NEEDED
Status: DISCONTINUED | OUTPATIENT
Start: 2020-07-03 | End: 2020-07-03 | Stop reason: HOSPADM

## 2020-07-03 RX ORDER — ONDANSETRON 2 MG/ML
4 INJECTION INTRAMUSCULAR; INTRAVENOUS EVERY 4 HOURS PRN
Status: DISCONTINUED | OUTPATIENT
Start: 2020-07-03 | End: 2020-07-06

## 2020-07-03 RX ORDER — NALOXONE HYDROCHLORIDE 0.4 MG/ML
80 INJECTION, SOLUTION INTRAMUSCULAR; INTRAVENOUS; SUBCUTANEOUS AS NEEDED
Status: DISCONTINUED | OUTPATIENT
Start: 2020-07-03 | End: 2020-07-03 | Stop reason: HOSPADM

## 2020-07-03 RX ORDER — MIDAZOLAM HYDROCHLORIDE 1 MG/ML
1 INJECTION INTRAMUSCULAR; INTRAVENOUS EVERY 5 MIN PRN
Status: DISCONTINUED | OUTPATIENT
Start: 2020-07-03 | End: 2020-07-03 | Stop reason: HOSPADM

## 2020-07-03 RX ORDER — MORPHINE SULFATE 4 MG/ML
4 INJECTION, SOLUTION INTRAMUSCULAR; INTRAVENOUS EVERY 2 HOUR PRN
Status: DISCONTINUED | OUTPATIENT
Start: 2020-07-03 | End: 2020-07-06

## 2020-07-03 RX ORDER — HYDROCODONE BITARTRATE AND ACETAMINOPHEN 5; 325 MG/1; MG/1
2 TABLET ORAL AS NEEDED
Status: DISCONTINUED | OUTPATIENT
Start: 2020-07-03 | End: 2020-07-03 | Stop reason: HOSPADM

## 2020-07-03 RX ORDER — KETAMINE HYDROCHLORIDE 50 MG/ML
INJECTION, SOLUTION, CONCENTRATE INTRAMUSCULAR; INTRAVENOUS AS NEEDED
Status: DISCONTINUED | OUTPATIENT
Start: 2020-07-03 | End: 2020-07-03 | Stop reason: SURG

## 2020-07-03 RX ORDER — INSULIN ASPART 100 [IU]/ML
INJECTION, SOLUTION INTRAVENOUS; SUBCUTANEOUS ONCE
Status: DISCONTINUED | OUTPATIENT
Start: 2020-07-03 | End: 2020-07-03 | Stop reason: HOSPADM

## 2020-07-03 RX ORDER — DEXTROSE MONOHYDRATE 25 G/50ML
50 INJECTION, SOLUTION INTRAVENOUS
Status: DISCONTINUED | OUTPATIENT
Start: 2020-07-03 | End: 2020-07-06

## 2020-07-03 RX ORDER — DEXAMETHASONE SODIUM PHOSPHATE 4 MG/ML
4 VIAL (ML) INJECTION AS NEEDED
Status: DISCONTINUED | OUTPATIENT
Start: 2020-07-03 | End: 2020-07-03 | Stop reason: HOSPADM

## 2020-07-03 RX ORDER — MORPHINE SULFATE 4 MG/ML
1 INJECTION, SOLUTION INTRAMUSCULAR; INTRAVENOUS EVERY 2 HOUR PRN
Status: DISCONTINUED | OUTPATIENT
Start: 2020-07-03 | End: 2020-07-06

## 2020-07-03 RX ORDER — LABETALOL HYDROCHLORIDE 5 MG/ML
5 INJECTION, SOLUTION INTRAVENOUS EVERY 5 MIN PRN
Status: DISCONTINUED | OUTPATIENT
Start: 2020-07-03 | End: 2020-07-03 | Stop reason: HOSPADM

## 2020-07-03 RX ORDER — ONDANSETRON 2 MG/ML
4 INJECTION INTRAMUSCULAR; INTRAVENOUS EVERY 6 HOURS PRN
Status: DISCONTINUED | OUTPATIENT
Start: 2020-07-03 | End: 2020-07-06

## 2020-07-03 RX ORDER — HYDROCODONE BITARTRATE AND ACETAMINOPHEN 5; 325 MG/1; MG/1
1 TABLET ORAL AS NEEDED
Status: DISCONTINUED | OUTPATIENT
Start: 2020-07-03 | End: 2020-07-03 | Stop reason: HOSPADM

## 2020-07-03 RX ORDER — BUPIVACAINE HYDROCHLORIDE 5 MG/ML
INJECTION, SOLUTION EPIDURAL; INTRACAUDAL AS NEEDED
Status: DISCONTINUED | OUTPATIENT
Start: 2020-07-03 | End: 2020-07-03 | Stop reason: HOSPADM

## 2020-07-03 RX ORDER — ONDANSETRON 2 MG/ML
4 INJECTION INTRAMUSCULAR; INTRAVENOUS AS NEEDED
Status: DISCONTINUED | OUTPATIENT
Start: 2020-07-03 | End: 2020-07-03 | Stop reason: HOSPADM

## 2020-07-03 RX ORDER — SODIUM CHLORIDE 9 MG/ML
INJECTION, SOLUTION INTRAVENOUS CONTINUOUS
Status: DISCONTINUED | OUTPATIENT
Start: 2020-07-03 | End: 2020-07-04

## 2020-07-03 RX ORDER — HYDROMORPHONE HYDROCHLORIDE 1 MG/ML
0.5 INJECTION, SOLUTION INTRAMUSCULAR; INTRAVENOUS; SUBCUTANEOUS EVERY 30 MIN PRN
Status: ACTIVE | OUTPATIENT
Start: 2020-07-03 | End: 2020-07-03

## 2020-07-03 RX ORDER — DEXAMETHASONE SODIUM PHOSPHATE 4 MG/ML
VIAL (ML) INJECTION AS NEEDED
Status: DISCONTINUED | OUTPATIENT
Start: 2020-07-03 | End: 2020-07-03 | Stop reason: SURG

## 2020-07-03 RX ORDER — DEXTROSE MONOHYDRATE 25 G/50ML
50 INJECTION, SOLUTION INTRAVENOUS
Status: DISCONTINUED | OUTPATIENT
Start: 2020-07-03 | End: 2020-07-03 | Stop reason: HOSPADM

## 2020-07-03 RX ORDER — HEPARIN SODIUM 5000 [USP'U]/ML
5000 INJECTION, SOLUTION INTRAVENOUS; SUBCUTANEOUS EVERY 8 HOURS SCHEDULED
Status: DISCONTINUED | OUTPATIENT
Start: 2020-07-03 | End: 2020-07-06

## 2020-07-03 RX ORDER — HYDROMORPHONE HYDROCHLORIDE 1 MG/ML
0.4 INJECTION, SOLUTION INTRAMUSCULAR; INTRAVENOUS; SUBCUTANEOUS EVERY 5 MIN PRN
Status: DISCONTINUED | OUTPATIENT
Start: 2020-07-03 | End: 2020-07-03 | Stop reason: HOSPADM

## 2020-07-03 RX ORDER — SODIUM CHLORIDE 9 MG/ML
INJECTION, SOLUTION INTRAVENOUS CONTINUOUS
Status: ACTIVE | OUTPATIENT
Start: 2020-07-03 | End: 2020-07-03

## 2020-07-03 RX ORDER — METOCLOPRAMIDE HYDROCHLORIDE 5 MG/ML
5 INJECTION INTRAMUSCULAR; INTRAVENOUS EVERY 8 HOURS PRN
Status: DISCONTINUED | OUTPATIENT
Start: 2020-07-03 | End: 2020-07-06

## 2020-07-03 RX ORDER — DIPHENHYDRAMINE HYDROCHLORIDE 50 MG/ML
12.5 INJECTION INTRAMUSCULAR; INTRAVENOUS AS NEEDED
Status: DISCONTINUED | OUTPATIENT
Start: 2020-07-03 | End: 2020-07-03 | Stop reason: HOSPADM

## 2020-07-03 RX ADMIN — DEXAMETHASONE SODIUM PHOSPHATE 8 MG: 4 MG/ML VIAL (ML) INJECTION at 11:04:00

## 2020-07-03 RX ADMIN — KETAMINE HYDROCHLORIDE 25 MG: 50 INJECTION, SOLUTION, CONCENTRATE INTRAMUSCULAR; INTRAVENOUS at 11:00:00

## 2020-07-03 RX ADMIN — ONDANSETRON 4 MG: 2 INJECTION INTRAMUSCULAR; INTRAVENOUS at 11:04:00

## 2020-07-03 RX ADMIN — LIDOCAINE HYDROCHLORIDE 50 MG: 10 INJECTION, SOLUTION EPIDURAL; INFILTRATION; INTRACAUDAL; PERINEURAL at 11:00:00

## 2020-07-03 NOTE — BRIEF OP NOTE
Pre-Operative Diagnosis: Osteomyelitis and gangrene right hallux     Post-Operative Diagnosis: Same     Procedure Performed:   Procedure(s):  RIGHT GREAT TOE AMPUTATION, disarticulation at the MTP joint    Surgeon(s) and Role:     * Alley Kerr DPM

## 2020-07-03 NOTE — ED PROVIDER NOTES
Patient Seen in: [facility] Emergency Department      History   Patient presents with:  Lower Extremity Injury    Stated Complaint: toe infection    HPI    43-year-old male who was admitted to the hospital but signed out [address] rate was 100 normal sinus without murmurs  Abdomen: No abdominal breathing. Soft on exam  Extremities: Right hallux ulceration, erythema and swelling.   Seropurulent drainage noted       ED Course     Labs Reviewed   [facility]

## 2020-07-03 NOTE — PROGRESS NOTES
NURSING ADMISSION NOTE      Patient admitted via cart   Oriented to room. Safety precautions initiated. Bed in low position. Call light in reach. Denies pain. Dressing changed d/t mod serosanguinous drainage. Isolation precautions in place.

## 2020-07-03 NOTE — ED NOTES
Report given and endorsed to patricia jaime, pt aox3, nad, skin warm and intact, bandage intact, pt ambulates steady and unassisted, bed currently dirty, when bed ready, pt is rtg

## 2020-07-03 NOTE — PROGRESS NOTES
[facility] Pharmacy Note:  Renal Adjustment for piperacillin/tazobactam (Stefan Irons)    Shannen Love is a 38year old male who has been prescribed piperacillin/tazobactam (ZOSYN) 3.375 gm every 8 hrs.   CrCl is estimated creatinine clearance is 14.8 mL/min (A) (b

## 2020-07-03 NOTE — PLAN OF CARE
Blood sugar below sliding scale, held insulin dosing this morning. Denies pain when assessed, will reassess postop. Down to OR this am.   Up as tolerated in room, will order surgical shoe for patient on return from OR.    IV zosyn and vanco per ID, Vanco

## 2020-07-03 NOTE — ANESTHESIA POSTPROCEDURE EVALUATION
[address] Patient Status:  Inpatient   Age/Gender 38year old male MRN [id number]   [facility] SURGERY Attending Carmita Mooney MD   Hosp Day # 0 PCP Karl Balderrama       Anesthesia Post-op Note    Procedure(s):  RIGHT

## 2020-07-03 NOTE — PROGRESS NOTES
[facility] Dosing Service    Initial Pharmacokinetic Consult for Vancomycin Dosing     James Bonds is a 38year old patient who is being treated for Right 1st toe large ulceration/MRSA infection/ Osteomyelitis.   Pharmacy has been asked to dose [facility]

## 2020-07-03 NOTE — ED INITIAL ASSESSMENT (HPI)
Patient here for admission for toe infection. Left ama earlier due to family issues and now back to continue treatment.

## 2020-07-03 NOTE — ANESTHESIA PREPROCEDURE EVALUATION
PRE-OP EVALUATION    Patient Name: Christine Hess    Pre-op Diagnosis: Osteomyelitis ([address] [M86.9]    Procedure(s):  RIGHT GREAT TOE AMPUTATION    Surgeon(s) and Role:     * Tracy Kerr DPM - Primary    Pre-op vitals reviewed.   Temp: 98.9 °F (3 Q4H PRN  Vancomycin HCl (VANCOCIN) 500 mg in sodium chloride 0.9% 150 mL IVPB, 500 mg, Intravenous, Once per day on Mon Wed Fri  [COMPLETED] Vancomycin HCl (VANCOCIN) 1,000 mg in sodium chloride 0.9% 250 mL IVPB add-vantage, 1,000 mg, Intravenous, Once history. Pulmonary    Pulmonary exam normal.                 Other findings            ASA: 3   Plan: general  NPO status verified and patient meets guidelines.         Comment: Options, risks (medication reaction, aspiration, nausea, injury to lips

## 2020-07-03 NOTE — PROGRESS NOTES
07/03/20  0730   BP: 151/82   Pulse: 91   Resp: 18   Temp: 98.9 °F (37.2 °C)   Patient patient was seen in his room resting comfortably in bed. He is preparing for a disarticulation amputation of the right great toe for osteomyelitis.     Objective: Dress

## 2020-07-03 NOTE — H&P
[facility]  History and Physical     [facility] Patient Status:  Inpatient    1985 MRN [id number]   [facility] 3SW-A Attending Hiwot Wagner MD   Hosp Day # 0 PCP Lajuan Dancer     Chief Complaint: Left foot infection facility-administered medications on file prior to encounter. amLODIPine Besylate 10 MG Oral Tab, Take 20 mg by mouth daily. , Disp: , Rfl:   calcium acetate 667 MG Oral Cap, Take 2 capsules by mouth 2 (two) times daily. , Disp: , Rfl:   insulin glargine 1 Recent Labs   Lab 07/02/20  1232 07/03/20  0546   * 117*   BUN 33* 41*   CREATSERUM 7.48* 8.35*   GFRAA 10* 9*   GFRNAA 8* 7*   CA 9.1 8.4*   ALB 2.8*  --    * 137   K 3.4* 3.6   CL 96* 98   CO2 33.0* 31.0   ALKPHO 94  --    AST 50*  --

## 2020-07-03 NOTE — PROGRESS NOTES
Patient returned to unit from PACU. A/ox4, denies pain when assessed. Dressing CDI to right foot. Will order surgical shoe for patient when OOB. Resumed 1800 diabetic diet. Dialysis arranged to start this afternoon around 1300.

## 2020-07-03 NOTE — PROGRESS NOTES
[facility]                INFECTIOUS DISEASE PROGRESS NOTE    Lilli Sake Patient Status:  Inpatient    1985 MRN [id number]   [facility] 3SW-A Attending Jose M Trejo MD   Hosp Day # 0 PCP Ankit Celeste     Antibiotics: reviewed:  Patient Active Problem List:     Back pain     Left leg weakness     Skin ulcer of right foot, limited to breakdown of skin ([address]     Diabetic foot ulcer with osteomyelitis ([address]     Diabetic foot infection ([address]     ESRD (end stage renal diseas

## 2020-07-03 NOTE — PROGRESS NOTES
[facility]  Progress Note     Blancaben Parisir Patient Status:  Inpatient    1985 MRN [id number]   [facility] 3SW-A Attending Jada Willis MD   Hosp Day # 0 PCP Karen Taier     Chief Complaint: left foot infection    S: Mei Epic.    Medications:   • Heparin Sodium (Porcine)  5,000 Units Subcutaneous Q8H [facility]   • piperacillin-tazobactam  3.375 g Intravenous Q12H   • insulin detemir  6 Units Subcutaneous Nightly   • Insulin Aspart Pen  1-10 Units Subcutaneous TID AC and HS   • v

## 2020-07-04 ENCOUNTER — APPOINTMENT (OUTPATIENT)
Dept: GENERAL RADIOLOGY | Facility: HOSPITAL | Age: 35
DRG: 255 | End: 2020-07-04
Attending: PODIATRIST
Payer: MEDICARE

## 2020-07-04 LAB
GLUCOSE BLD-MCNC: 128 MG/DL (ref 70–99)
GLUCOSE BLD-MCNC: 148 MG/DL (ref 70–99)
GLUCOSE BLD-MCNC: 237 MG/DL (ref 70–99)
GLUCOSE BLD-MCNC: 280 MG/DL (ref 70–99)

## 2020-07-04 PROCEDURE — 99222 1ST HOSP IP/OBS MODERATE 55: CPT | Performed by: INTERNAL MEDICINE

## 2020-07-04 PROCEDURE — 73620 X-RAY EXAM OF FOOT: CPT | Performed by: PODIATRIST

## 2020-07-04 PROCEDURE — 99232 SBSQ HOSP IP/OBS MODERATE 35: CPT | Performed by: HOSPITALIST

## 2020-07-04 RX ORDER — HYDRALAZINE HYDROCHLORIDE 20 MG/ML
10 INJECTION INTRAMUSCULAR; INTRAVENOUS EVERY 6 HOURS PRN
Status: DISCONTINUED | OUTPATIENT
Start: 2020-07-04 | End: 2020-07-06

## 2020-07-04 RX ORDER — AMLODIPINE BESYLATE 5 MG/1
10 TABLET ORAL DAILY
Status: DISCONTINUED | OUTPATIENT
Start: 2020-07-04 | End: 2020-07-06

## 2020-07-04 NOTE — PROGRESS NOTES
[facility]  Progress Note     Jaya Jacobo Patient Status:  Inpatient    1985 MRN [id number]   [facility] 3SW-A Attending Lorelei Zaidi MD   Hosp Day # 1 PCP Ranjit Carrera     Chief Complaint: left foot infection    S: Mei amLODIPine Besylate  10 mg Oral Daily   • Heparin Sodium (Porcine)  5,000 Units Subcutaneous Q8H [address]   • piperacillin-tazobactam  3.375 g Intravenous Q12H   • insulin detemir  6 Units Subcutaneous Nightly   • Insulin Aspart Pen  1-10 Units Subcutaneous TID

## 2020-07-04 NOTE — CONSULTS
[facility]  Report of Consultation    Ricardo Naik Patient Status:  Inpatient    1985 MRN [id number]   [facility] 3SW-A Attending Burgess Hawa MD   [facility] Day # 1 PCP Ángel Dietrich       Assessment / Plan:    1) ESRD- due to lo Facility-Administered Medications:   •  0.9% NaCl infusion, , Intravenous, Continuous  •  Heparin Sodium (Porcine) 5000 UNIT/ML injection 5,000 Units, 5,000 Units, Subcutaneous, Q8H ERYN  •  morphINE sulfate (PF) 4 MG/ML injection 1 mg, 1 mg, Intravenous, Q at 7/3/2020 2000  Gross per 24 hour   Intake 704 ml   Output 2340 ml   Net -1636 ml     Wt Readings from Last 3 Encounters:  07/04/20 : 188 lb 11.4 oz (85.6 kg)  07/02/20 : 191 lb 11.2 oz (87 kg)  03/05/20 : 195 lb (88.5 kg)    General: awake alert  HEENT:

## 2020-07-04 NOTE — PROGRESS NOTES
[facility]  Progress Note    Monica Cancel Patient Status:  Inpatient    1985 MRN [id number]   [facility] 3SW-A Attending Jessie Oquendo MD   [facility] Day # 1 PCP Fabrice Nicole     SUBJECTIVE:  Interval History: Patient has no curre

## 2020-07-04 NOTE — PLAN OF CARE
POD 1 Rt great toe amputation, Pt is AAOX4, room air, VSS, dialysis completed today, blood glucose monitored and treated per orders, cultures pending, drowsy this evening, IVF w/ IV ABX, NWB to Rt foot. PT / OT eval 7/4/20 will need crutch training likely.

## 2020-07-04 NOTE — PLAN OF CARE
Hf=135/87, seen by Dr. Jane Caro, Amlodipine 10 mg ordered daily, Zosyn con't ,cultures pending, denies pain to Rt foot,PT to see,  updated on plan of care.

## 2020-07-04 NOTE — CM/SW NOTE
07/04/20 1500   CM/SW Referral Data   Referral Source Social Work (self-referral)   Reason for Referral Discharge planning;Psychoscial assessment   Informant Patient;Edward Staff   Patient Info   Patient's Mental Status Alert;Oriented   Patient lives wi

## 2020-07-05 LAB
GLUCOSE BLD-MCNC: 142 MG/DL (ref 70–99)
GLUCOSE BLD-MCNC: 173 MG/DL (ref 70–99)
GLUCOSE BLD-MCNC: 239 MG/DL (ref 70–99)
GLUCOSE BLD-MCNC: 247 MG/DL (ref 70–99)

## 2020-07-05 PROCEDURE — 99232 SBSQ HOSP IP/OBS MODERATE 35: CPT | Performed by: HOSPITALIST

## 2020-07-05 RX ORDER — METOPROLOL TARTRATE 5 MG/5ML
5 INJECTION INTRAVENOUS EVERY 4 HOURS PRN
Status: DISCONTINUED | OUTPATIENT
Start: 2020-07-05 | End: 2020-07-06

## 2020-07-05 NOTE — PLAN OF CARE
POD 2 Rt great toe amputation, Pt is AAOX4, room air, BP elevated, orders for Hydralazine, blood glucose monitored and treated per orders, cultures pending, IVF w/ IV ABX, NWB to Rt foot.  Sx Dressing in place, remains CDI, per Podiatry leave dressing in pl

## 2020-07-05 NOTE — PHYSICAL THERAPY NOTE
PHYSICAL THERAPY TREATMENT NOTE - INPATIENT    Room Number: 368/368-A     Session: 1   Number of Visits to Meet Established Goals: 3    Presenting Problem: s/p right great to amputation 7/3/20  History related to current admission: Pt is 37 yo male admitt arms (e.g., wheelchair, bedside commode, etc.): None   -   Moving from lying on back to sitting on the side of the bed?: None   How much help from another person does the patient currently need. ..   -   Moving to and from a bed to a chair (including a whee Pt currently denying need for this per SW note. Pt is impulsive with movement and requires cueing for safety awareness. DISCHARGE RECOMMENDATIONS  PT Discharge Recommendations: Home with home health PT     PLAN  PT Treatment Plan: Endurance; Body mechan

## 2020-07-05 NOTE — PLAN OF CARE
Per patient, Dr. Jay Garcia ID was here today to see him and told patient that he might be able to go home tomorrow.

## 2020-07-05 NOTE — PROGRESS NOTES
[facility]  Progress Note    Marlinda Romberg Patient Status:  Inpatient    1985 MRN [id number]   [facility] 3SW-A Attending Brianne Brewster MD   [facility] Day # 2 PCP Zina Wallace     SUBJECTIVE:  Interval History: Patient has no curre

## 2020-07-05 NOTE — PLAN OF CARE
B/P elevated this am. IV hydralazine given and B/P now  143/84. Right foot dressing changed by Dr. Indira Will this morning. Patient denies pain. Patient on iv antibiotics.  Patient on BR with BRP, using urinal.

## 2020-07-05 NOTE — PROGRESS NOTES
[facility]  Progress Note     Ele Soto Patient Status:  Inpatient    1985 MRN [id number]   [facility] 3SW-A Attending Hanh Carroll MD   [address] Day # 2 PCP Stacy Opitz     Chief Complaint: left foot infection    S: Mei [START ON 7/6/2020] epoetin sophia-epbx  10,000 Units Intravenous Once in dialysis   • amLODIPine Besylate  10 mg Oral Daily   • Heparin Sodium (Porcine)  5,000 Units Subcutaneous Q8H [address]   • piperacillin-tazobactam  3.375 g Intravenous Q12H   • insulin dete

## 2020-07-06 ENCOUNTER — APPOINTMENT (OUTPATIENT)
Dept: WOUND CARE | Facility: HOSPITAL | Age: 35
DRG: 638 | End: 2020-07-06
Attending: INTERNAL MEDICINE
Payer: MEDICARE

## 2020-07-06 VITALS
HEIGHT: 72 IN | WEIGHT: 195.56 LBS | DIASTOLIC BLOOD PRESSURE: 89 MMHG | BODY MASS INDEX: 26.49 KG/M2 | TEMPERATURE: 98 F | SYSTOLIC BLOOD PRESSURE: 175 MMHG | RESPIRATION RATE: 18 BRPM | HEART RATE: 93 BPM | OXYGEN SATURATION: 100 %

## 2020-07-06 LAB
ANION GAP SERPL CALC-SCNC: 6 MMOL/L (ref 0–18)
BUN BLD-MCNC: 57 MG/DL (ref 7–18)
BUN/CREAT SERPL: 6.2 (ref 10–20)
CALCIUM BLD-MCNC: 8.9 MG/DL (ref 8.5–10.1)
CHLORIDE SERPL-SCNC: 101 MMOL/L (ref 98–112)
CO2 SERPL-SCNC: 26 MMOL/L (ref 21–32)
CREAT BLD-MCNC: 9.2 MG/DL (ref 0.7–1.3)
GLUCOSE BLD-MCNC: 125 MG/DL (ref 70–99)
GLUCOSE BLD-MCNC: 136 MG/DL (ref 70–99)
GLUCOSE BLD-MCNC: 193 MG/DL (ref 70–99)
GLUCOSE BLD-MCNC: 212 MG/DL (ref 70–99)
OSMOLALITY SERPL CALC.SUM OF ELEC: 298 MOSM/KG (ref 275–295)
POTASSIUM SERPL-SCNC: 3.9 MMOL/L (ref 3.5–5.1)
SODIUM SERPL-SCNC: 133 MMOL/L (ref 136–145)

## 2020-07-06 PROCEDURE — 99239 HOSP IP/OBS DSCHRG MGMT >30: CPT | Performed by: HOSPITALIST

## 2020-07-06 PROCEDURE — 99233 SBSQ HOSP IP/OBS HIGH 50: CPT | Performed by: INTERNAL MEDICINE

## 2020-07-06 RX ORDER — ERGOCALCIFEROL (VITAMIN D2) 1250 MCG
50000 CAPSULE ORAL
COMMUNITY

## 2020-07-06 RX ORDER — ATORVASTATIN CALCIUM 40 MG/1
40 TABLET, FILM COATED ORAL NIGHTLY
COMMUNITY

## 2020-07-06 RX ORDER — LISINOPRIL 20 MG/1
20 TABLET ORAL DAILY
COMMUNITY
End: 2021-02-10

## 2020-07-06 RX ORDER — DOXYCYCLINE HYCLATE 100 MG/1
100 CAPSULE ORAL 2 TIMES DAILY
Qty: 20 CAPSULE | Refills: 0 | Status: SHIPPED | OUTPATIENT
Start: 2020-07-06 | End: 2020-07-16

## 2020-07-06 NOTE — PROGRESS NOTES
[facility]  Progress Note     Harvie Dancer Patient Status:  Inpatient    1985 MRN [id number]   [facility] 3SW-A Attending Jerica Garcia MD   Hosp Day # 3 PCP Valentina Soto     Chief Complaint: left foot infection    S: Mei Epic.    Medications:   • epoetin sophia-epbx  10,000 Units Intravenous Once in dialysis   • amLODIPine Besylate  10 mg Oral Daily   • Heparin Sodium (Porcine)  5,000 Units Subcutaneous Q8H [address]   • piperacillin-tazobactam  3.375 g Intravenous Q12H   • insuli

## 2020-07-06 NOTE — PROGRESS NOTES
[facility]                INFECTIOUS DISEASE PROGRESS NOTE    Jaya Jacobo Patient Status:  Inpatient    1985 MRN [id number]   [facility] 3SW-A Attending Lorelei Zaidi MD   [facility] Day # 3 PCP Ranjit Carrera     Antibiotics: Time: 07/03/20 11:22 AM   Result Value Ref Range    Tissue Culture Result 4+ growth Staphylococcus aureus, MRSA (A) N/A    Tissue Culture Result 4+ growth Enterococcus faecalis (A) N/A    Tissue Smear 1+ WBCs seen (A) N/A    Tissue Smear 2+ Gram positive c

## 2020-07-06 NOTE — OPERATIVE REPORT
[facility]    PATIENT'S NAME: Ele Valdez   ATTENDING PHYSICIAN: Teto Dunbar M.D. OPERATING PHYSICIAN: Haily Breen D.P.M.    PATIENT ACCOUNT#:   [de-identified]    LOCATION:  [facility]  MEDICAL RECORD #:   [id number]       DATE OF BIR above.  The right foot was prepped and draped using usual aseptic technique. Hemostasis was achieved in the above-mentioned fashion.   A fishmouth incision with the apices located proximal medial and proximal lateral was made extending over the metatarsal

## 2020-07-06 NOTE — PLAN OF CARE
Patient alert and oriented x4. VSS on RA. Afebrile. Tolerating diet, denies nausea. Voiding freely. Last BM 6/30, declines laxatives. Denies pain. Ace wrap to right foot, C/D/I. NWB. HD bedside this afternoon. Plan to discharge to home when ready.  Pt agree

## 2020-07-06 NOTE — PROGRESS NOTES
[facility]  Nephrology Progress Note    Cheri Push Attending:  Laya Tucker MD       Assessment and Plan:    1) ESRD- due to longstanding DM; lytes / volume OK.  HD today per urual routine     2) Diabetic foot ulcer / osteo- s/p R great toe mg, 10 mg, Intravenous, Q6H PRN  Heparin Sodium (Porcine) 5000 UNIT/ML injection 5,000 Units, 5,000 Units, Subcutaneous, Q8H ERYN  morphINE sulfate (PF) 4 MG/ML injection 1 mg, 1 mg, Intravenous, Q2H PRN    Or  morphINE sulfate (PF) 4 MG/ML injection 2 mg,

## 2020-07-06 NOTE — PLAN OF CARE
POD 3 Rt great toe amputation, Pt is AAOX4, room air, BP elevated, orders for Hydralazine, blood glucose monitored and treated per orders, cultures pending, IVF w/ IV ABX, NWB to Rt foot. Sx Dressing in place, remains CDI. No pain reported by Pt.  Plan D/C

## 2020-07-06 NOTE — PROGRESS NOTES
[facility]  Progress Note    Naqvi Solo Patient Status:  Inpatient    1985 MRN [id number]   [facility] 3SW-A Attending Moreno Gaspar MD   [address] Day # 3 PCP Nuha Goyal     SUBJECTIVE:  Interval History: Patient has no curre 2) Continue DVT prophylaxis  3) Continue PT/OT  4)Leave dressing intact, patient to follow-up with Dr. George Noble    7/10/2013 at his [place] office  5) patient okay for discharge once infectious disease determines antibiotics.     Bossman Hager  7/6/20

## 2020-07-07 ENCOUNTER — APPOINTMENT (OUTPATIENT)
Dept: WOUND CARE | Facility: HOSPITAL | Age: 35
DRG: 638 | End: 2020-07-07
Attending: INTERNAL MEDICINE
Payer: MEDICARE

## 2020-07-07 ENCOUNTER — TELEPHONE (OUTPATIENT)
Dept: PODIATRY CLINIC | Facility: CLINIC | Age: 35
End: 2020-07-07

## 2020-07-07 NOTE — PLAN OF CARE
Zosyn infusion completed. Saline lock removed. Discharge instructions reviewed with patient. Patient to follow up with Dr Liliana Viramontes on 7/10 and needs to call for an appointment. Instructed not to remove dressing to right foot.  Instructed to  oral anti

## 2020-07-07 NOTE — DISCHARGE SUMMARY
[facility] HOSPITALIST  DISCHARGE SUMMARY     Ricardo Naik Patient Status:  Inpatient    1985 MRN [id number]   [facility] 3SW-A Attending No att. providers found   Hosp Day # 3 PCP Edward Karlo     Date of Admission: 2020  Date of D Medium Risk  0-28   Low Risk         TCM Follow-Up Recommendation:  LACE > 58: High Risk of readmission after discharge from the hospital.    Procedures during hospitalization:     1.  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    Schedule an appointment as

## 2020-07-07 NOTE — TELEPHONE ENCOUNTER
Per patient, had surgery 7/3 and Dr. Saima Greenberg wants him to be seen Friday, no open appointment slots.  Please advise

## 2020-07-08 ENCOUNTER — APPOINTMENT (OUTPATIENT)
Dept: WOUND CARE | Facility: HOSPITAL | Age: 35
DRG: 638 | End: 2020-07-08
Attending: INTERNAL MEDICINE
Payer: MEDICARE

## 2020-07-08 NOTE — TELEPHONE ENCOUNTER
S/w pt and appt made on 7/10 @ 1:15pm with Dr. Briana Fuller at [facility]

## 2020-07-09 ENCOUNTER — APPOINTMENT (OUTPATIENT)
Dept: WOUND CARE | Facility: HOSPITAL | Age: 35
DRG: 638 | End: 2020-07-09
Attending: INTERNAL MEDICINE
Payer: MEDICARE

## 2020-07-10 ENCOUNTER — OFFICE VISIT (OUTPATIENT)
Dept: PODIATRY CLINIC | Facility: CLINIC | Age: 35
End: 2020-07-10
Payer: MEDICARE

## 2020-07-10 ENCOUNTER — APPOINTMENT (OUTPATIENT)
Dept: WOUND CARE | Facility: HOSPITAL | Age: 35
DRG: 638 | End: 2020-07-10
Attending: INTERNAL MEDICINE
Payer: MEDICARE

## 2020-07-10 DIAGNOSIS — E11.42 DIABETIC PERIPHERAL NEUROPATHY (HCC): ICD-10-CM

## 2020-07-10 DIAGNOSIS — Z98.890 STATUS POST FOOT SURGERY: Primary | ICD-10-CM

## 2020-07-10 DIAGNOSIS — L97.511 SKIN ULCER OF RIGHT FOOT, LIMITED TO BREAKDOWN OF SKIN (HCC): ICD-10-CM

## 2020-07-10 PROCEDURE — 99024 POSTOP FOLLOW-UP VISIT: CPT | Performed by: PODIATRIST

## 2020-07-13 ENCOUNTER — APPOINTMENT (OUTPATIENT)
Dept: WOUND CARE | Facility: HOSPITAL | Age: 35
DRG: 638 | End: 2020-07-13
Attending: INTERNAL MEDICINE
Payer: MEDICARE

## 2020-07-13 NOTE — PROGRESS NOTES
Vernell Gist is a 38year old male. Patient presents with:  Post-Op: right foot sx on 7/3/20 - denies any pain.         HPI:   Patient returns to the clinic he is now 1 week status post surgery doing well his dressing is somewhat loosened and somewhat Used    Substance and Sexual Activity      Alcohol use: No        Alcohol/week: 0.0 standard drinks      Drug use: No          REVIEW OF SYSTEMS:   Today reviewed systens as documented below  GENERAL HEALTH: feels well otherwise  SKIN: Refer to exam below

## 2020-07-14 ENCOUNTER — APPOINTMENT (OUTPATIENT)
Dept: WOUND CARE | Facility: HOSPITAL | Age: 35
DRG: 638 | End: 2020-07-14
Attending: INTERNAL MEDICINE
Payer: MEDICARE

## 2020-07-15 ENCOUNTER — APPOINTMENT (OUTPATIENT)
Dept: WOUND CARE | Facility: HOSPITAL | Age: 35
DRG: 638 | End: 2020-07-15
Attending: INTERNAL MEDICINE
Payer: MEDICARE

## 2020-07-16 ENCOUNTER — APPOINTMENT (OUTPATIENT)
Dept: WOUND CARE | Facility: HOSPITAL | Age: 35
DRG: 638 | End: 2020-07-16
Attending: INTERNAL MEDICINE
Payer: MEDICARE

## 2020-07-17 ENCOUNTER — OFFICE VISIT (OUTPATIENT)
Dept: PODIATRY CLINIC | Facility: CLINIC | Age: 35
End: 2020-07-17
Payer: MEDICARE

## 2020-07-17 ENCOUNTER — APPOINTMENT (OUTPATIENT)
Dept: WOUND CARE | Facility: HOSPITAL | Age: 35
DRG: 638 | End: 2020-07-17
Attending: INTERNAL MEDICINE
Payer: MEDICARE

## 2020-07-17 VITALS — BODY MASS INDEX: 26.41 KG/M2 | WEIGHT: 195 LBS | HEIGHT: 72 IN

## 2020-07-17 DIAGNOSIS — Z98.890 STATUS POST FOOT SURGERY: Primary | ICD-10-CM

## 2020-07-17 DIAGNOSIS — E11.42 DIABETIC PERIPHERAL NEUROPATHY (HCC): ICD-10-CM

## 2020-07-17 PROCEDURE — 99024 POSTOP FOLLOW-UP VISIT: CPT | Performed by: PODIATRIST

## 2020-07-17 PROCEDURE — 1111F DSCHRG MED/CURRENT MED MERGE: CPT | Performed by: PODIATRIST

## 2020-07-17 RX ORDER — DOXYCYCLINE HYCLATE 100 MG
100 TABLET ORAL 2 TIMES DAILY
COMMUNITY
Start: 2020-06-29 | End: 2020-09-04

## 2020-07-19 NOTE — PROGRESS NOTES
Kamila Santiago is a 38year old male. Patient presents with:  Procedure Follow Up: f/u on foot surgery on 7/3/20, denies pain today.  on 7/17/20, A1c 7.4 on 6/5/20        HPI:   Patient returns to the clinic for follow-up.   Again his dressing is Activity      Alcohol use: No        Alcohol/week: 0.0 standard drinks      Drug use: No          REVIEW OF SYSTEMS:   Today reviewed systens as documented below  GENERAL HEALTH: feels well otherwise  SKIN: Refer to exam below  RESPIRATORY: oswald younger

## 2020-07-20 ENCOUNTER — APPOINTMENT (OUTPATIENT)
Dept: WOUND CARE | Facility: HOSPITAL | Age: 35
DRG: 638 | End: 2020-07-20
Attending: INTERNAL MEDICINE
Payer: MEDICARE

## 2020-07-21 ENCOUNTER — APPOINTMENT (OUTPATIENT)
Dept: WOUND CARE | Facility: HOSPITAL | Age: 35
DRG: 638 | End: 2020-07-21
Attending: INTERNAL MEDICINE
Payer: MEDICARE

## 2020-07-22 ENCOUNTER — APPOINTMENT (OUTPATIENT)
Dept: WOUND CARE | Facility: HOSPITAL | Age: 35
DRG: 638 | End: 2020-07-22
Attending: INTERNAL MEDICINE
Payer: MEDICARE

## 2020-07-23 ENCOUNTER — APPOINTMENT (OUTPATIENT)
Dept: WOUND CARE | Facility: HOSPITAL | Age: 35
DRG: 638 | End: 2020-07-23
Attending: INTERNAL MEDICINE
Payer: MEDICARE

## 2020-07-24 ENCOUNTER — APPOINTMENT (OUTPATIENT)
Dept: WOUND CARE | Facility: HOSPITAL | Age: 35
DRG: 638 | End: 2020-07-24
Attending: INTERNAL MEDICINE
Payer: MEDICARE

## 2020-07-27 ENCOUNTER — HOSPITAL ENCOUNTER (OUTPATIENT)
Dept: GENERAL RADIOLOGY | Age: 35
Discharge: HOME | End: 2020-07-27
Attending: PODIATRIST
Payer: MEDICARE

## 2020-07-27 ENCOUNTER — APPOINTMENT (OUTPATIENT)
Dept: WOUND CARE | Facility: HOSPITAL | Age: 35
DRG: 638 | End: 2020-07-27
Attending: INTERNAL MEDICINE
Payer: MEDICARE

## 2020-07-27 ENCOUNTER — OFFICE VISIT (OUTPATIENT)
Dept: PODIATRY CLINIC | Facility: CLINIC | Age: 35
End: 2020-07-27
Payer: MEDICARE

## 2020-07-27 DIAGNOSIS — S91.301S OPEN WOUND OF RIGHT FOOT, SEQUELA: ICD-10-CM

## 2020-07-27 DIAGNOSIS — E11.42 DIABETIC PERIPHERAL NEUROPATHY (HCC): ICD-10-CM

## 2020-07-27 DIAGNOSIS — Z98.890 STATUS POST FOOT SURGERY: Primary | ICD-10-CM

## 2020-07-27 DIAGNOSIS — Z98.890 STATUS POST FOOT SURGERY: ICD-10-CM

## 2020-07-27 PROCEDURE — 73630 X-RAY EXAM OF FOOT: CPT | Performed by: PODIATRIST

## 2020-07-27 PROCEDURE — 99024 POSTOP FOLLOW-UP VISIT: CPT | Performed by: PODIATRIST

## 2020-07-27 NOTE — PROGRESS NOTES
Blanca Puente is a 38year old male. Patient presents with:  Post-Op: Right foot hallux amputation, surgery was on 7/3/2020. Patient denies any pain. FBS this am was 99.         HPI:   This pleasant patient returns to the clinic for standard postoperat Use      Smoking status: Never Smoker      Smokeless tobacco: Never Used    Substance and Sexual Activity      Alcohol use: No        Alcohol/week: 0.0 standard drinks      Drug use: No          REVIEW OF SYSTEMS:   Today reviewed systens as documented bel patient indicates understanding of these issues and agrees to the plan.     Luigi Fontenot DPM

## 2020-07-28 ENCOUNTER — APPOINTMENT (OUTPATIENT)
Dept: WOUND CARE | Facility: HOSPITAL | Age: 35
DRG: 638 | End: 2020-07-28
Attending: INTERNAL MEDICINE
Payer: MEDICARE

## 2020-07-29 ENCOUNTER — APPOINTMENT (OUTPATIENT)
Dept: WOUND CARE | Facility: HOSPITAL | Age: 35
DRG: 638 | End: 2020-07-29
Attending: INTERNAL MEDICINE
Payer: MEDICARE

## 2020-07-30 ENCOUNTER — APPOINTMENT (OUTPATIENT)
Dept: WOUND CARE | Facility: HOSPITAL | Age: 35
DRG: 638 | End: 2020-07-30
Attending: INTERNAL MEDICINE
Payer: MEDICARE

## 2020-07-31 ENCOUNTER — OFFICE VISIT (OUTPATIENT)
Dept: WOUND CARE | Facility: HOSPITAL | Age: 35
End: 2020-07-31
Attending: NURSE PRACTITIONER
Payer: MEDICARE

## 2020-07-31 ENCOUNTER — APPOINTMENT (OUTPATIENT)
Dept: WOUND CARE | Facility: HOSPITAL | Age: 35
DRG: 638 | End: 2020-07-31
Attending: INTERNAL MEDICINE
Payer: MEDICARE

## 2020-07-31 DIAGNOSIS — Z79.4 LONG TERM (CURRENT) USE OF INSULIN (HCC): ICD-10-CM

## 2020-07-31 DIAGNOSIS — L97.516 NON-PRESSURE CHRONIC ULCER OF OTHER PART OF RIGHT FOOT WITH BONE INVOLVEMENT WITHOUT EVIDENCE OF NECROSIS (HCC): Primary | ICD-10-CM

## 2020-07-31 DIAGNOSIS — M86.171 OTHER ACUTE OSTEOMYELITIS, RIGHT ANKLE AND FOOT (HCC): ICD-10-CM

## 2020-07-31 DIAGNOSIS — E11.621 TYPE 2 DIABETES MELLITUS WITH FOOT ULCER (HCC): ICD-10-CM

## 2020-07-31 DIAGNOSIS — L97.509 TYPE 2 DIABETES MELLITUS WITH FOOT ULCER (HCC): ICD-10-CM

## 2020-07-31 DIAGNOSIS — B95.62 METHICILLIN RESISTANT STAPHYLOCOCCUS AUREUS INFECTION AS THE CAUSE OF DISEASES CLASSIFIED ELSEWHERE: ICD-10-CM

## 2020-07-31 LAB — GLUCOSE BLD-MCNC: 214 MG/DL (ref 70–99)

## 2020-07-31 PROCEDURE — 99214 OFFICE O/P EST MOD 30 MIN: CPT

## 2020-07-31 PROCEDURE — 82962 GLUCOSE BLOOD TEST: CPT

## 2020-07-31 NOTE — PROGRESS NOTES
Subjective    Chief Complaint  This information was obtained from the patient  Patient is here for a wound care follow up. He had the toe amputated on 7/3/2020. He denies any pain to the wound.     Allergies  No known allergies  General Notes:  no allergies Of note he has a history of end-stage renal disease with nephropathy has been on dialysis since September 2019, hypertension with hypertensive kidney disease, hypertension diabetic neuropathy, diabetic retinopathy.  Hemoglobin A1c done in September 2019 is Constitutional Symptoms (General Health):  Fatigue, Fever, Loss of Appetite, Marked Weight Change, Night Sweats, Chills  Respiratory: Cough, Shortness of Breath  Cardiovascular (Central/Peripheral): Chest Pain, Dyspnea on Exertion, Edema  Musculoskeletal: B Wound #3 Right Great Toe is a chronic Hall Grade 3 Diabetic Ulcer and has received a status of Not Healed. Initial wound encounter measurements are 1cm length x 5.8cm width x 1cm depth, with an area of 5.8 sq cm and a volume of 5.8 cubic cm.  Bone is expo Psychiatric:  Judgment and insight intact. Alert and oriented times 3. No evidence of depression, anxiety, or agitation. Calm, cooperative, and communicative. Appropriate interactions and affect.         Assessment    Active Problems    ICD-10  (Encounter Perfusion assessed by palpation of pulses. Last Sharp Debridement Date: - 6-10-20  Last Albumin Date and Value: - July 2020 albumin 2.8/tp7.6  Osteomyelitis confirmed by: Sarah Cardenas 7-27-20 CONCLUSION:  1. Post right great toe amputation.  No evidence of remai Entered By: Caryle Countryman on 07/31/2020 11:43:32

## 2020-08-03 ENCOUNTER — APPOINTMENT (OUTPATIENT)
Dept: WOUND CARE | Facility: HOSPITAL | Age: 35
End: 2020-08-03
Attending: INTERNAL MEDICINE
Payer: MEDICARE

## 2020-08-04 ENCOUNTER — APPOINTMENT (OUTPATIENT)
Dept: WOUND CARE | Facility: HOSPITAL | Age: 35
End: 2020-08-04
Attending: INTERNAL MEDICINE
Payer: MEDICARE

## 2020-08-04 ENCOUNTER — OFFICE VISIT (OUTPATIENT)
Dept: WOUND CARE | Facility: HOSPITAL | Age: 35
End: 2020-08-04
Attending: NURSE PRACTITIONER
Payer: MEDICARE

## 2020-08-04 DIAGNOSIS — B95.62 METHICILLIN RESISTANT STAPHYLOCOCCUS AUREUS INFECTION AS THE CAUSE OF DISEASES CLASSIFIED ELSEWHERE: ICD-10-CM

## 2020-08-04 DIAGNOSIS — M86.171 OTHER ACUTE OSTEOMYELITIS, RIGHT ANKLE AND FOOT (HCC): ICD-10-CM

## 2020-08-04 DIAGNOSIS — L97.516 NON-PRESSURE CHRONIC ULCER OF OTHER PART OF RIGHT FOOT WITH BONE INVOLVEMENT WITHOUT EVIDENCE OF NECROSIS (HCC): Primary | ICD-10-CM

## 2020-08-04 DIAGNOSIS — E11.621 TYPE 2 DIABETES MELLITUS WITH FOOT ULCER (HCC): ICD-10-CM

## 2020-08-04 DIAGNOSIS — Z79.4 LONG TERM (CURRENT) USE OF INSULIN (HCC): ICD-10-CM

## 2020-08-04 DIAGNOSIS — L97.509 TYPE 2 DIABETES MELLITUS WITH FOOT ULCER (HCC): ICD-10-CM

## 2020-08-04 LAB — GLUCOSE BLD-MCNC: 198 MG/DL (ref 70–99)

## 2020-08-04 PROCEDURE — 82962 GLUCOSE BLOOD TEST: CPT

## 2020-08-04 PROCEDURE — 97605 NEG PRS WND THER DME<=50SQCM: CPT

## 2020-08-07 ENCOUNTER — OFFICE VISIT (OUTPATIENT)
Dept: WOUND CARE | Facility: HOSPITAL | Age: 35
End: 2020-08-07
Attending: NURSE PRACTITIONER
Payer: MEDICARE

## 2020-08-07 DIAGNOSIS — B95.62 METHICILLIN RESISTANT STAPHYLOCOCCUS AUREUS INFECTION AS THE CAUSE OF DISEASES CLASSIFIED ELSEWHERE: ICD-10-CM

## 2020-08-07 DIAGNOSIS — Z79.4 LONG TERM (CURRENT) USE OF INSULIN (HCC): ICD-10-CM

## 2020-08-07 DIAGNOSIS — L97.516 NON-PRESSURE CHRONIC ULCER OF OTHER PART OF RIGHT FOOT WITH BONE INVOLVEMENT WITHOUT EVIDENCE OF NECROSIS (HCC): Primary | ICD-10-CM

## 2020-08-07 DIAGNOSIS — M86.171 OTHER ACUTE OSTEOMYELITIS, RIGHT ANKLE AND FOOT (HCC): ICD-10-CM

## 2020-08-07 DIAGNOSIS — L97.509 TYPE 2 DIABETES MELLITUS WITH FOOT ULCER (HCC): ICD-10-CM

## 2020-08-07 DIAGNOSIS — E11.621 TYPE 2 DIABETES MELLITUS WITH FOOT ULCER (HCC): ICD-10-CM

## 2020-08-07 LAB — GLUCOSE BLD-MCNC: 140 MG/DL (ref 70–99)

## 2020-08-07 PROCEDURE — 82962 GLUCOSE BLOOD TEST: CPT

## 2020-08-07 PROCEDURE — 97605 NEG PRS WND THER DME<=50SQCM: CPT

## 2020-08-07 NOTE — PROGRESS NOTES
Subjective    Chief Complaint  This information was obtained from the patient  Patient is here for a follow up visit for a right foot wound. Patient arrives with wound vac beeping an alarm for blockage, he states it started last night.     Allergies  No kno Of note he has a history of end-stage renal disease with nephropathy has been on dialysis since September 2019, hypertension with hypertensive kidney disease, hypertension diabetic neuropathy, diabetic retinopathy.  Hemoglobin A1c done in September 2019 is Neurological: Loss of Protective Sensation    Patient denies complaints or symptoms related to:  Constitutional Symptoms (General Health):  Fatigue, Fever, Loss of Appetite, Marked Weight Change, Night Sweats, Chills  Respiratory: Cough, Shortness of Breath bp elevated recheck improved, patient denies symptoms of headache, dizziness, sob, or vision changes. will continue to monitor. Pulse Regular and wnl for patient. Satish Angles Respirations easy and unlabored. Temperature wnl. Weight normal for height. . Appearance neat RN visit for assessment of wound(s). - for wound vac changes x 2 weekly  Other: - Ronan Piper ( 6) is a patient of mine at the [address] [place] [address]. Seymour Dawson is non weight bearing on his right foot.  He also has appointmen 1. There is a small ulceration along the plantar aspect of the left 1st digit at the a proximal IP joint space level. There is infiltration of the surrounding fat with granulation tissue noted adjacent to the distal phalanx of the right 1st digit.   There i

## 2020-08-10 ENCOUNTER — OFFICE VISIT (OUTPATIENT)
Dept: WOUND CARE | Facility: HOSPITAL | Age: 35
End: 2020-08-10
Attending: NURSE PRACTITIONER
Payer: MEDICARE

## 2020-08-10 DIAGNOSIS — M86.171 OTHER ACUTE OSTEOMYELITIS, RIGHT ANKLE AND FOOT (HCC): ICD-10-CM

## 2020-08-10 DIAGNOSIS — B95.62 METHICILLIN RESISTANT STAPHYLOCOCCUS AUREUS INFECTION AS THE CAUSE OF DISEASES CLASSIFIED ELSEWHERE: ICD-10-CM

## 2020-08-10 DIAGNOSIS — E11.621 TYPE 2 DIABETES MELLITUS WITH FOOT ULCER (HCC): ICD-10-CM

## 2020-08-10 DIAGNOSIS — Z79.4 LONG TERM (CURRENT) USE OF INSULIN (HCC): ICD-10-CM

## 2020-08-10 DIAGNOSIS — L97.509 TYPE 2 DIABETES MELLITUS WITH FOOT ULCER (HCC): ICD-10-CM

## 2020-08-10 DIAGNOSIS — L97.516 NON-PRESSURE CHRONIC ULCER OF OTHER PART OF RIGHT FOOT WITH BONE INVOLVEMENT WITHOUT EVIDENCE OF NECROSIS (HCC): Primary | ICD-10-CM

## 2020-08-10 LAB — GLUCOSE BLD-MCNC: 151 MG/DL (ref 70–99)

## 2020-08-10 PROCEDURE — 97605 NEG PRS WND THER DME<=50SQCM: CPT

## 2020-08-10 PROCEDURE — 82962 GLUCOSE BLOOD TEST: CPT

## 2020-08-12 ENCOUNTER — OFFICE VISIT (OUTPATIENT)
Dept: WOUND CARE | Facility: HOSPITAL | Age: 35
End: 2020-08-12
Attending: NURSE PRACTITIONER
Payer: MEDICARE

## 2020-08-12 DIAGNOSIS — B95.62 METHICILLIN RESISTANT STAPHYLOCOCCUS AUREUS INFECTION AS THE CAUSE OF DISEASES CLASSIFIED ELSEWHERE: ICD-10-CM

## 2020-08-12 DIAGNOSIS — M86.171 OTHER ACUTE OSTEOMYELITIS, RIGHT ANKLE AND FOOT (HCC): ICD-10-CM

## 2020-08-12 DIAGNOSIS — L97.516 NON-PRESSURE CHRONIC ULCER OF OTHER PART OF RIGHT FOOT WITH BONE INVOLVEMENT WITHOUT EVIDENCE OF NECROSIS (HCC): Primary | ICD-10-CM

## 2020-08-12 DIAGNOSIS — Z79.4 LONG TERM (CURRENT) USE OF INSULIN (HCC): ICD-10-CM

## 2020-08-12 DIAGNOSIS — E11.621 TYPE 2 DIABETES MELLITUS WITH FOOT ULCER (HCC): ICD-10-CM

## 2020-08-12 DIAGNOSIS — L97.509 TYPE 2 DIABETES MELLITUS WITH FOOT ULCER (HCC): ICD-10-CM

## 2020-08-12 LAB — GLUCOSE BLD-MCNC: 142 MG/DL (ref 70–99)

## 2020-08-12 PROCEDURE — 82962 GLUCOSE BLOOD TEST: CPT

## 2020-08-12 PROCEDURE — 97605 NEG PRS WND THER DME<=50SQCM: CPT

## 2020-08-14 ENCOUNTER — OFFICE VISIT (OUTPATIENT)
Dept: WOUND CARE | Facility: HOSPITAL | Age: 35
End: 2020-08-14
Attending: NURSE PRACTITIONER
Payer: MEDICARE

## 2020-08-14 DIAGNOSIS — B95.62 METHICILLIN RESISTANT STAPHYLOCOCCUS AUREUS INFECTION AS THE CAUSE OF DISEASES CLASSIFIED ELSEWHERE: ICD-10-CM

## 2020-08-14 DIAGNOSIS — E11.621 TYPE 2 DIABETES MELLITUS WITH FOOT ULCER (HCC): ICD-10-CM

## 2020-08-14 DIAGNOSIS — L97.509 TYPE 2 DIABETES MELLITUS WITH FOOT ULCER (HCC): ICD-10-CM

## 2020-08-14 DIAGNOSIS — M86.171 OTHER ACUTE OSTEOMYELITIS, RIGHT ANKLE AND FOOT (HCC): ICD-10-CM

## 2020-08-14 DIAGNOSIS — L97.516 NON-PRESSURE CHRONIC ULCER OF OTHER PART OF RIGHT FOOT WITH BONE INVOLVEMENT WITHOUT EVIDENCE OF NECROSIS (HCC): Primary | ICD-10-CM

## 2020-08-14 DIAGNOSIS — Z79.4 LONG TERM (CURRENT) USE OF INSULIN (HCC): ICD-10-CM

## 2020-08-14 LAB — GLUCOSE BLD-MCNC: 263 MG/DL (ref 70–99)

## 2020-08-14 PROCEDURE — 82962 GLUCOSE BLOOD TEST: CPT

## 2020-08-14 PROCEDURE — 97605 NEG PRS WND THER DME<=50SQCM: CPT

## 2020-08-14 NOTE — PROGRESS NOTES
Subjective    Chief Complaint  This information was obtained from the patient  Patient is here for a wound care follow up. He denies any pain or new wound concerns. His vac began to alarm for blockage last night.     Allergies  No known allergies  General N kidney disease, hypertension diabetic neuropathy, diabetic retinopathy. Hemoglobin A1c done in September 2019 is 6.5.     HPI PRIOR TO AUGUST 2020 PLEASE SEE INDIVIDUAL PROGRESS NOTES  7-31-20 Since last visit, patient was admitted to hospital had amputatio (retinopathy)  Genitourinary (): Other (Hemodialysis)  Integumentary (Hair/Skin/Nails): Dryness, Calluses/Corns, Ulcers  Neurological: Loss of Protective Sensation    Patient denies complaints or symptoms related to:  Constitutional Symptoms (General Hea Respirations easy and unlabored. Temperature wnl. Weight normal for height. . Appearance neat and clean. Appears in no acute distress. Well nourished and well developed.     Cardiovascular:  dp/pt palpable right. right lower Extremity free of varicosities, c Dressings:  May shower with protection.   Cleanse with saline or wound cleanser  Other skin sub or sample of skin sub - keracis #3: stacked in wound bed (over the bone), sorbact and then vac  Change Dressing 3x/week    Care summary  Reviewed and evaluated l regranex. we are not making progress with granulation coverage over the bone. i did fill out work disability paperwork for patient today.  will update dr. Chiqui Recio Signature(s)  Signed By: Date:  Luis Dietz FNP-C, CWNATI-AP, CFCN, CSWS, [facility]

## 2020-08-17 ENCOUNTER — OFFICE VISIT (OUTPATIENT)
Dept: WOUND CARE | Facility: HOSPITAL | Age: 35
End: 2020-08-17
Attending: NURSE PRACTITIONER
Payer: MEDICARE

## 2020-08-17 DIAGNOSIS — L97.509 TYPE 2 DIABETES MELLITUS WITH FOOT ULCER (HCC): ICD-10-CM

## 2020-08-17 DIAGNOSIS — Z79.4 LONG TERM (CURRENT) USE OF INSULIN (HCC): ICD-10-CM

## 2020-08-17 DIAGNOSIS — M86.171 OTHER ACUTE OSTEOMYELITIS, RIGHT ANKLE AND FOOT (HCC): ICD-10-CM

## 2020-08-17 DIAGNOSIS — B95.62 METHICILLIN RESISTANT STAPHYLOCOCCUS AUREUS INFECTION AS THE CAUSE OF DISEASES CLASSIFIED ELSEWHERE: ICD-10-CM

## 2020-08-17 DIAGNOSIS — L97.516 NON-PRESSURE CHRONIC ULCER OF OTHER PART OF RIGHT FOOT WITH BONE INVOLVEMENT WITHOUT EVIDENCE OF NECROSIS (HCC): ICD-10-CM

## 2020-08-17 DIAGNOSIS — E11.621 TYPE 2 DIABETES MELLITUS WITH FOOT ULCER, UNSPECIFIED WHETHER LONG TERM INSULIN USE (HCC): Primary | ICD-10-CM

## 2020-08-17 DIAGNOSIS — L97.509 TYPE 2 DIABETES MELLITUS WITH FOOT ULCER, UNSPECIFIED WHETHER LONG TERM INSULIN USE (HCC): Primary | ICD-10-CM

## 2020-08-17 DIAGNOSIS — E11.621 TYPE 2 DIABETES MELLITUS WITH FOOT ULCER (HCC): ICD-10-CM

## 2020-08-17 LAB — GLUCOSE BLD-MCNC: 125 MG/DL (ref 70–99)

## 2020-08-17 PROCEDURE — 82962 GLUCOSE BLOOD TEST: CPT

## 2020-08-17 PROCEDURE — 97605 NEG PRS WND THER DME<=50SQCM: CPT

## 2020-08-19 ENCOUNTER — HOSPITAL ENCOUNTER (EMERGENCY)
Facility: HOSPITAL | Age: 35
Discharge: HOME | End: 2020-08-19
Attending: EMERGENCY MEDICINE
Payer: MEDICARE

## 2020-08-19 ENCOUNTER — OFFICE VISIT (OUTPATIENT)
Dept: WOUND CARE | Facility: HOSPITAL | Age: 35
End: 2020-08-19
Attending: NURSE PRACTITIONER
Payer: MEDICARE

## 2020-08-19 VITALS
DIASTOLIC BLOOD PRESSURE: 100 MMHG | HEART RATE: 93 BPM | SYSTOLIC BLOOD PRESSURE: 199 MMHG | TEMPERATURE: 99 F | OXYGEN SATURATION: 100 % | WEIGHT: 198 LBS | RESPIRATION RATE: 19 BRPM | HEIGHT: 72 IN | BODY MASS INDEX: 26.82 KG/M2

## 2020-08-19 DIAGNOSIS — B95.62 METHICILLIN RESISTANT STAPHYLOCOCCUS AUREUS INFECTION AS THE CAUSE OF DISEASES CLASSIFIED ELSEWHERE: ICD-10-CM

## 2020-08-19 DIAGNOSIS — E11.621 TYPE 2 DIABETES MELLITUS WITH FOOT ULCER (HCC): Primary | ICD-10-CM

## 2020-08-19 DIAGNOSIS — M86.171 OTHER ACUTE OSTEOMYELITIS, RIGHT ANKLE AND FOOT (HCC): ICD-10-CM

## 2020-08-19 DIAGNOSIS — I16.0 HYPERTENSIVE URGENCY: Primary | ICD-10-CM

## 2020-08-19 DIAGNOSIS — L97.509 TYPE 2 DIABETES MELLITUS WITH FOOT ULCER (HCC): Primary | ICD-10-CM

## 2020-08-19 DIAGNOSIS — L97.516 NON-PRESSURE CHRONIC ULCER OF OTHER PART OF RIGHT FOOT WITH BONE INVOLVEMENT WITHOUT EVIDENCE OF NECROSIS (HCC): ICD-10-CM

## 2020-08-19 DIAGNOSIS — Z79.4 LONG TERM (CURRENT) USE OF INSULIN (HCC): ICD-10-CM

## 2020-08-19 LAB
ALBUMIN SERPL-MCNC: 3.2 G/DL (ref 3.4–5)
ALBUMIN/GLOB SERPL: 0.7 {RATIO} (ref 1–2)
ALP LIVER SERPL-CCNC: 74 U/L (ref 45–117)
ALT SERPL-CCNC: 26 U/L (ref 16–61)
ANION GAP SERPL CALC-SCNC: 3 MMOL/L (ref 0–18)
AST SERPL-CCNC: 24 U/L (ref 15–37)
BASOPHILS # BLD AUTO: 0.04 X10(3) UL (ref 0–0.2)
BASOPHILS NFR BLD AUTO: 0.3 %
BILIRUB SERPL-MCNC: 0.4 MG/DL (ref 0.1–2)
BUN BLD-MCNC: 49 MG/DL (ref 7–18)
BUN/CREAT SERPL: 6.1 (ref 10–20)
CALCIUM BLD-MCNC: 9.2 MG/DL (ref 8.5–10.1)
CHLORIDE SERPL-SCNC: 100 MMOL/L (ref 98–112)
CO2 SERPL-SCNC: 30 MMOL/L (ref 21–32)
CREAT BLD-MCNC: 8.05 MG/DL (ref 0.7–1.3)
DEPRECATED RDW RBC AUTO: 54.4 FL (ref 35.1–46.3)
EOSINOPHIL # BLD AUTO: 0.03 X10(3) UL (ref 0–0.7)
EOSINOPHIL NFR BLD AUTO: 0.2 %
ERYTHROCYTE [DISTWIDTH] IN BLOOD BY AUTOMATED COUNT: 16.5 % (ref 11–15)
GLOBULIN PLAS-MCNC: 4.7 G/DL (ref 2.8–4.4)
GLUCOSE BLD-MCNC: 127 MG/DL (ref 70–99)
GLUCOSE BLD-MCNC: 145 MG/DL (ref 70–99)
HCT VFR BLD AUTO: 37.7 % (ref 39–53)
HGB BLD-MCNC: 11.8 G/DL (ref 13–17.5)
IMM GRANULOCYTES # BLD AUTO: 0.04 X10(3) UL (ref 0–1)
IMM GRANULOCYTES NFR BLD: 0.3 %
INR BLD: 0.99 (ref 0.89–1.11)
LYMPHOCYTES # BLD AUTO: 1.35 X10(3) UL (ref 1–4)
LYMPHOCYTES NFR BLD AUTO: 11.1 %
M PROTEIN MFR SERPL ELPH: 7.9 G/DL (ref 6.4–8.2)
MCH RBC QN AUTO: 28.1 PG (ref 26–34)
MCHC RBC AUTO-ENTMCNC: 31.3 G/DL (ref 31–37)
MCV RBC AUTO: 89.8 FL (ref 80–100)
MONOCYTES # BLD AUTO: 0.81 X10(3) UL (ref 0.1–1)
MONOCYTES NFR BLD AUTO: 6.6 %
NEUTROPHILS # BLD AUTO: 9.93 X10 (3) UL (ref 1.5–7.7)
NEUTROPHILS # BLD AUTO: 9.93 X10(3) UL (ref 1.5–7.7)
NEUTROPHILS NFR BLD AUTO: 81.5 %
OSMOLALITY SERPL CALC.SUM OF ELEC: 291 MOSM/KG (ref 275–295)
PLATELET # BLD AUTO: 228 10(3)UL (ref 150–450)
POTASSIUM SERPL-SCNC: 4.6 MMOL/L (ref 3.5–5.1)
PSA SERPL DL<=0.01 NG/ML-MCNC: 13.4 SECONDS (ref 12.4–14.6)
RBC # BLD AUTO: 4.2 X10(6)UL (ref 4.3–5.7)
SARS-COV-2 RNA RESP QL NAA+PROBE: NOT DETECTED
SODIUM SERPL-SCNC: 133 MMOL/L (ref 136–145)
WBC # BLD AUTO: 12.2 X10(3) UL (ref 4–11)

## 2020-08-19 PROCEDURE — 96376 TX/PRO/DX INJ SAME DRUG ADON: CPT

## 2020-08-19 PROCEDURE — 99285 EMERGENCY DEPT VISIT HI MDM: CPT

## 2020-08-19 PROCEDURE — 80053 COMPREHEN METABOLIC PANEL: CPT

## 2020-08-19 PROCEDURE — 97605 NEG PRS WND THER DME<=50SQCM: CPT

## 2020-08-19 PROCEDURE — 85025 COMPLETE CBC W/AUTO DIFF WBC: CPT

## 2020-08-19 PROCEDURE — 84450 TRANSFERASE (AST) (SGOT): CPT | Performed by: EMERGENCY MEDICINE

## 2020-08-19 PROCEDURE — 80053 COMPREHEN METABOLIC PANEL: CPT | Performed by: EMERGENCY MEDICINE

## 2020-08-19 PROCEDURE — 85610 PROTHROMBIN TIME: CPT | Performed by: EMERGENCY MEDICINE

## 2020-08-19 PROCEDURE — 84132 ASSAY OF SERUM POTASSIUM: CPT | Performed by: EMERGENCY MEDICINE

## 2020-08-19 PROCEDURE — 96374 THER/PROPH/DIAG INJ IV PUSH: CPT

## 2020-08-19 PROCEDURE — 82962 GLUCOSE BLOOD TEST: CPT

## 2020-08-19 PROCEDURE — 85025 COMPLETE CBC W/AUTO DIFF WBC: CPT | Performed by: EMERGENCY MEDICINE

## 2020-08-19 PROCEDURE — 93005 ELECTROCARDIOGRAM TRACING: CPT

## 2020-08-19 PROCEDURE — 93010 ELECTROCARDIOGRAM REPORT: CPT

## 2020-08-19 PROCEDURE — 99284 EMERGENCY DEPT VISIT MOD MDM: CPT

## 2020-08-19 RX ORDER — HYDRALAZINE HYDROCHLORIDE 20 MG/ML
10 INJECTION INTRAMUSCULAR; INTRAVENOUS ONCE
Status: COMPLETED | OUTPATIENT
Start: 2020-08-19 | End: 2020-08-19

## 2020-08-19 RX ORDER — AMLODIPINE BESYLATE 10 MG/1
10 TABLET ORAL DAILY
Qty: 30 TABLET | Refills: 0 | Status: SHIPPED | OUTPATIENT
Start: 2020-08-19 | End: 2020-09-18

## 2020-08-19 RX ORDER — AMLODIPINE BESYLATE 5 MG/1
10 TABLET ORAL DAILY
Status: DISCONTINUED | OUTPATIENT
Start: 2020-08-19 | End: 2020-08-19

## 2020-08-19 NOTE — ED NOTES
Pt reports he went for dialysis today and fistula was not working so had eliceo cath placed to right chest wall this afternoon. Pt is supposed to go back for dialysis tomorrow. Pt reports his blood pressure was elevated today at dialysis center.  Pt states

## 2020-08-19 NOTE — ED NOTES
Round on pt. Updated pt on plan for adm.  Pt sts he does not want to stay in the hospital. MD updated

## 2020-08-19 NOTE — ED PROVIDER NOTES
Patient Seen in: [facility] Emergency Department      History   Patient presents with:  Hypertension    Stated Complaint: HTN, denies HA, dizziness, or CP.      HPI    This is a 26-year-old man history of diabetes, ESRD on Monday Wednesday Friday yves (Temporal)   Resp 19   Ht 182.9 cm (6')   Wt 89.8 kg   SpO2 100%   BMI 26.85 kg/m²         Physical Exam        Physical Exam  Vitals signs and nursing note reviewed.    General: Young man sitting in the bed no acute distress  Head: Normocephalic and atraum Final result                 Please view results for these tests on the individual orders. RAINBOW DRAW BLUE   RAINBOW DRAW LAVENDER   RAINBOW DRAW LIGHT GREEN   RAINBOW DRAW GOLD     EKG    Rate, intervals and axes as noted on EKG Report.   Rate: 88  Rhy

## 2020-08-19 NOTE — ED NOTES
Assuming care of pt from [facility] AT [place]. Pt sts he received dialysis yesterday and was supposed to receive it today but his fistula did not work. Pt came to the hospital for catheter placement but did not receive dialysis.  Pt then went to wound clinic for follow up fr

## 2020-08-19 NOTE — ED NOTES
Spoke to Roger banuelos in lab to notify blue top was redrawn and green top. AST continues to DC as \"background lab user. \"

## 2020-08-20 ENCOUNTER — HOSPITAL ENCOUNTER (OUTPATIENT)
Dept: MRI IMAGING | Facility: HOSPITAL | Age: 35
Discharge: HOME | End: 2020-08-20
Attending: NURSE PRACTITIONER
Payer: MEDICARE

## 2020-08-20 DIAGNOSIS — L97.509 TYPE 2 DIABETES MELLITUS WITH FOOT ULCER, UNSPECIFIED WHETHER LONG TERM INSULIN USE (HCC): ICD-10-CM

## 2020-08-20 DIAGNOSIS — E11.621 TYPE 2 DIABETES MELLITUS WITH FOOT ULCER, UNSPECIFIED WHETHER LONG TERM INSULIN USE (HCC): ICD-10-CM

## 2020-08-20 LAB
ATRIAL RATE: 88 BPM
P AXIS: 43 DEGREES
P-R INTERVAL: 140 MS
Q-T INTERVAL: 398 MS
QRS DURATION: 84 MS
QTC CALCULATION (BEZET): 481 MS
R AXIS: 47 DEGREES
T AXIS: 47 DEGREES
VENTRICULAR RATE: 88 BPM

## 2020-08-20 PROCEDURE — 73718 MRI LOWER EXTREMITY W/O DYE: CPT | Performed by: NURSE PRACTITIONER

## 2020-08-21 ENCOUNTER — OFFICE VISIT (OUTPATIENT)
Dept: WOUND CARE | Facility: HOSPITAL | Age: 35
End: 2020-08-21
Attending: NURSE PRACTITIONER
Payer: MEDICARE

## 2020-08-21 ENCOUNTER — TELEPHONE (OUTPATIENT)
Dept: PODIATRY CLINIC | Facility: CLINIC | Age: 35
End: 2020-08-21

## 2020-08-21 DIAGNOSIS — L97.509 TYPE 2 DIABETES MELLITUS WITH FOOT ULCER, UNSPECIFIED WHETHER LONG TERM INSULIN USE (HCC): Primary | ICD-10-CM

## 2020-08-21 DIAGNOSIS — Z79.4 LONG TERM (CURRENT) USE OF INSULIN (HCC): ICD-10-CM

## 2020-08-21 DIAGNOSIS — L97.516 NON-PRESSURE CHRONIC ULCER OF OTHER PART OF RIGHT FOOT WITH BONE INVOLVEMENT WITHOUT EVIDENCE OF NECROSIS (HCC): ICD-10-CM

## 2020-08-21 DIAGNOSIS — B95.62 METHICILLIN RESISTANT STAPHYLOCOCCUS AUREUS INFECTION AS THE CAUSE OF DISEASES CLASSIFIED ELSEWHERE: ICD-10-CM

## 2020-08-21 DIAGNOSIS — M86.171 ACUTE OSTEOMYELITIS OF RIGHT ANKLE OR FOOT (HCC): ICD-10-CM

## 2020-08-21 DIAGNOSIS — E11.621 TYPE 2 DIABETES MELLITUS WITH FOOT ULCER, UNSPECIFIED WHETHER LONG TERM INSULIN USE (HCC): Primary | ICD-10-CM

## 2020-08-21 LAB — GLUCOSE BLD-MCNC: 129 MG/DL (ref 70–99)

## 2020-08-21 PROCEDURE — 97605 NEG PRS WND THER DME<=50SQCM: CPT

## 2020-08-21 PROCEDURE — 82962 GLUCOSE BLOOD TEST: CPT

## 2020-08-21 NOTE — PROGRESS NOTES
Subjective    Chief Complaint  This information was obtained from the patient  Patient is here for a wound care follow up. He left the ER ama as he could not commit to 48 hour observation.     Allergies  No known allergies  General Notes:  no allergies note Of note he has a history of end-stage renal disease with nephropathy has been on dialysis since September 2019, hypertension with hypertensive kidney disease, hypertension diabetic neuropathy, diabetic retinopathy.  Hemoglobin A1c done in September 2019 is 8-21-20 patient returns today, discussed with rn after rn visit, patient was transported to ed and was given 2 doses of hydralazine which did help his bp somewhat, patient declined admission, he was started on amlodipine as well. he also did have the mri w The periwound skin exhibited: Edema, Moist. The periwound skin did not exhibit: Ecchymosis, Erythema. The temperature of the periwound skin is WNL. Periwound skin does not exhibit signs or symptoms of infection.  Local Pulse is Normal.  General Notes:  Ovidio Beasley (Encounter Diagnosis) B95.62 - Methicillin resistant Staphylococcus aureus infection as the cause of diseases classified elsewhere  (Encounter Diagnosis) E11.621 - Type 2 diabetes mellitus with foot ulcer  (Encounter Diagnosis) Z79.4 - Long term (current) patient to make an appointment with dr. Ramirez Izquierdo, he states he still is taking the doxy. will continue with wound vac for ease of drainage management.     Electronic Signature(s)  Signed By: Date:  Isidro MENON-C, ERNESTO-AP, CFCN, CSWS, [address] [place] [address],[address], [facility] 08/21/20

## 2020-08-21 NOTE — TELEPHONE ENCOUNTER
Called pt LMTCB-if pt CB please let them know that no openings at [facility] for several wks and pt should be seen sooner at [facility] office. Ok to offer 8/24 @ 9am or 3:45pm, ok to DB.

## 2020-08-21 NOTE — TELEPHONE ENCOUNTER
Please review pt MRI. No openings for wks at [facility]. Ok for appt on Monday at [facility]?

## 2020-08-21 NOTE — TELEPHONE ENCOUNTER
Per pt is needing an appt for a bone infection with Dr. Libia Meza in the [facility] as soon as possible. States office should be getting updates from the [address] [place] [address]. Offered first available (9/9/20) but states can not wait that long.  Please advise

## 2020-08-24 ENCOUNTER — OFFICE VISIT (OUTPATIENT)
Dept: PODIATRY CLINIC | Facility: CLINIC | Age: 35
End: 2020-08-24
Payer: MEDICARE

## 2020-08-24 ENCOUNTER — TELEPHONE (OUTPATIENT)
Dept: PODIATRY CLINIC | Facility: CLINIC | Age: 35
End: 2020-08-24

## 2020-08-24 ENCOUNTER — OFFICE VISIT (OUTPATIENT)
Dept: WOUND CARE | Facility: HOSPITAL | Age: 35
End: 2020-08-24
Attending: NURSE PRACTITIONER
Payer: MEDICARE

## 2020-08-24 DIAGNOSIS — Z98.890 STATUS POST FOOT SURGERY: ICD-10-CM

## 2020-08-24 DIAGNOSIS — B95.62 METHICILLIN RESISTANT STAPHYLOCOCCUS AUREUS INFECTION AS THE CAUSE OF DISEASES CLASSIFIED ELSEWHERE: ICD-10-CM

## 2020-08-24 DIAGNOSIS — M86.171 ACUTE OSTEOMYELITIS OF RIGHT ANKLE OR FOOT (HCC): Primary | ICD-10-CM

## 2020-08-24 DIAGNOSIS — L97.511 SKIN ULCER OF RIGHT FOOT, LIMITED TO BREAKDOWN OF SKIN (HCC): ICD-10-CM

## 2020-08-24 DIAGNOSIS — M86.9 OSTEOMYELITIS OF METATARSAL (HCC): ICD-10-CM

## 2020-08-24 DIAGNOSIS — S91.301S OPEN WOUND OF RIGHT FOOT, SEQUELA: ICD-10-CM

## 2020-08-24 DIAGNOSIS — E11.42 DIABETIC PERIPHERAL NEUROPATHY (HCC): Primary | ICD-10-CM

## 2020-08-24 DIAGNOSIS — L97.509 TYPE 2 DIABETES MELLITUS WITH FOOT ULCER (HCC): ICD-10-CM

## 2020-08-24 DIAGNOSIS — Z79.4 LONG TERM (CURRENT) USE OF INSULIN (HCC): ICD-10-CM

## 2020-08-24 DIAGNOSIS — L97.516 NON-PRESSURE CHRONIC ULCER OF OTHER PART OF RIGHT FOOT WITH BONE INVOLVEMENT WITHOUT EVIDENCE OF NECROSIS (HCC): ICD-10-CM

## 2020-08-24 DIAGNOSIS — E11.621 TYPE 2 DIABETES MELLITUS WITH FOOT ULCER (HCC): ICD-10-CM

## 2020-08-24 DIAGNOSIS — E11.42 DIABETIC PERIPHERAL NEUROPATHY (HCC): ICD-10-CM

## 2020-08-24 DIAGNOSIS — M86.9 OSTEOMYELITIS OF METATARSAL (HCC): Primary | ICD-10-CM

## 2020-08-24 LAB — GLUCOSE BLD-MCNC: 120 MG/DL (ref 70–99)

## 2020-08-24 PROCEDURE — 99214 OFFICE O/P EST MOD 30 MIN: CPT

## 2020-08-24 PROCEDURE — 82962 GLUCOSE BLOOD TEST: CPT

## 2020-08-24 PROCEDURE — 99024 POSTOP FOLLOW-UP VISIT: CPT | Performed by: PODIATRIST

## 2020-08-24 NOTE — TELEPHONE ENCOUNTER
Procedure: First metatarsal head resection for osteomyelitis right foot  CPT code: 30614  Length of Surgery: 45 minutes  Any Instruments: Mini power, mini fluoroscopy  Call patient: ASAP  Anesthesia: MAC  Location: [facility]  Assistance: none  Pacemaker: No  An

## 2020-08-24 NOTE — PROGRESS NOTES
Debbi Kim is a 38year old male.  Patient presents with:  Post-Op: s/p R foot big toe amputation f/u - had sx on 7/3/2020- and MRI results - states he has some drainage, no pain - this AM his [id number]        HPI:   Patient returns to the clinic he no history.    Social History    Socioeconomic History      Marital status: Single      Spouse name: Not on file      Number of children: Not on file      Years of education: Not on file      Highest education level: Not on file    Tobacco Use      Smoking sta dillon-and postoperative management was discussed, along with changes in bathing habits as well. The necessity for restricted activity possible nonweightbearing was also reviewed.   The possible complications and risks associated with the surgery including b

## 2020-08-25 NOTE — TELEPHONE ENCOUNTER
WMN-do you need to extend his antibiotics? See below . Spoke with patient this date and he would like to proceed with surgery on 9/2/20 at [address] which was scheduled this date.   Pre and post op instructions were reviewed this date including the need for C

## 2020-08-26 ENCOUNTER — OFFICE VISIT (OUTPATIENT)
Dept: WOUND CARE | Facility: HOSPITAL | Age: 35
End: 2020-08-26
Attending: NURSE PRACTITIONER
Payer: MEDICARE

## 2020-08-26 DIAGNOSIS — Z79.4 LONG TERM (CURRENT) USE OF INSULIN (HCC): ICD-10-CM

## 2020-08-26 DIAGNOSIS — L97.509 TYPE 2 DIABETES MELLITUS WITH FOOT ULCER (HCC): ICD-10-CM

## 2020-08-26 DIAGNOSIS — M86.171 ACUTE OSTEOMYELITIS OF RIGHT ANKLE OR FOOT (HCC): Primary | ICD-10-CM

## 2020-08-26 DIAGNOSIS — B95.62 METHICILLIN RESISTANT STAPHYLOCOCCUS AUREUS INFECTION AS THE CAUSE OF DISEASES CLASSIFIED ELSEWHERE: ICD-10-CM

## 2020-08-26 DIAGNOSIS — E11.621 TYPE 2 DIABETES MELLITUS WITH FOOT ULCER (HCC): ICD-10-CM

## 2020-08-26 DIAGNOSIS — L97.516 NON-PRESSURE CHRONIC ULCER OF OTHER PART OF RIGHT FOOT WITH BONE INVOLVEMENT WITHOUT EVIDENCE OF NECROSIS (HCC): ICD-10-CM

## 2020-08-26 LAB — GLUCOSE BLD-MCNC: 128 MG/DL (ref 70–99)

## 2020-08-26 PROCEDURE — 82962 GLUCOSE BLOOD TEST: CPT

## 2020-08-26 PROCEDURE — 97605 NEG PRS WND THER DME<=50SQCM: CPT

## 2020-08-26 NOTE — PROGRESS NOTES
Subjective    Chief Complaint  This information was obtained from the patient  Patient is here for a wound care follow up. His mother passed away earlier today.     Allergies  No known allergies  General Notes:  no allergies noted    HPI  This information w Of note he has a history of end-stage renal disease with nephropathy has been on dialysis since September 2019, hypertension with hypertensive kidney disease, hypertension diabetic neuropathy, diabetic retinopathy.  Hemoglobin A1c done in September 2019 is 8-21-20 patient returns today, discussed with rn after rn visit, patient was transported to ed and was given 2 doses of hydralazine which did help his bp somewhat, patient declined admission, he was started on amlodipine as well. he also did have the mri w Wound #3 Right Great Toe is a chronic Hall Grade 3 Diabetic Ulcer and has received a status of Not Healed. Subsequent wound encounter measurements are 4.1cm length x 1.1cm width x 0.5cm depth, with an area of 4.51 sq cm and a volume of 2.255 cubic cm.  Katey Bui Judgment and insight intact. Alert and oriented times 3. patient with teary and sad affect, but cooperative with exam and answers questions appropriately.         Assessment    Active Problems    ICD-10  (Encounter Diagnosis) M86.171 - Other acute osteomyel 1. Post right great toe amputation. No evidence of remaining osteomyelitis. 2. A soft tissue defect over the distal aspect of the amputation site. 3. Atherosclerotic vascular calcification.     Pathology 7-8-20: Right great toe, amputation:  -Skin and und

## 2020-08-27 ENCOUNTER — HOSPITAL ENCOUNTER (OUTPATIENT)
Dept: ULTRASOUND IMAGING | Facility: HOSPITAL | Age: 35
Discharge: HOME | End: 2020-08-27
Attending: SURGERY
Payer: MEDICARE

## 2020-08-27 DIAGNOSIS — Z01.818 PRE-OPERATIVE EXAM: ICD-10-CM

## 2020-08-27 DIAGNOSIS — N18.6 END STAGE RENAL DISEASE (HCC): ICD-10-CM

## 2020-08-27 DIAGNOSIS — T82.318A BREAKDOWN (MECHANICAL) OF OTHER VASCULAR GRAFTS, INITIAL ENCOUNTER (HCC): ICD-10-CM

## 2020-08-27 PROCEDURE — 93970 EXTREMITY STUDY: CPT | Performed by: SURGERY

## 2020-08-27 PROCEDURE — 93930 UPPER EXTREMITY STUDY: CPT | Performed by: SURGERY

## 2020-08-28 ENCOUNTER — OFFICE VISIT (OUTPATIENT)
Dept: WOUND CARE | Facility: HOSPITAL | Age: 35
End: 2020-08-28
Attending: NURSE PRACTITIONER
Payer: MEDICARE

## 2020-08-28 DIAGNOSIS — M86.171 OTHER ACUTE OSTEOMYELITIS, RIGHT ANKLE AND FOOT (HCC): ICD-10-CM

## 2020-08-28 DIAGNOSIS — Z79.4 LONG TERM (CURRENT) USE OF INSULIN (HCC): ICD-10-CM

## 2020-08-28 DIAGNOSIS — L97.509 TYPE 2 DIABETES MELLITUS WITH FOOT ULCER (HCC): ICD-10-CM

## 2020-08-28 DIAGNOSIS — E11.621 TYPE 2 DIABETES MELLITUS WITH FOOT ULCER (HCC): ICD-10-CM

## 2020-08-28 DIAGNOSIS — L97.516 NON-PRESSURE CHRONIC ULCER OF OTHER PART OF RIGHT FOOT WITH BONE INVOLVEMENT WITHOUT EVIDENCE OF NECROSIS (HCC): Primary | ICD-10-CM

## 2020-08-28 DIAGNOSIS — B95.62 METHICILLIN RESISTANT STAPHYLOCOCCUS AUREUS INFECTION AS THE CAUSE OF DISEASES CLASSIFIED ELSEWHERE: ICD-10-CM

## 2020-08-28 LAB — GLUCOSE BLD-MCNC: 144 MG/DL (ref 70–99)

## 2020-08-28 PROCEDURE — 97605 NEG PRS WND THER DME<=50SQCM: CPT

## 2020-08-28 PROCEDURE — 82962 GLUCOSE BLOOD TEST: CPT

## 2020-08-31 ENCOUNTER — LAB REQUISITION (OUTPATIENT)
Dept: LAB | Facility: HOSPITAL | Age: 35
End: 2020-08-31
Payer: MEDICARE

## 2020-08-31 ENCOUNTER — OFFICE VISIT (OUTPATIENT)
Dept: WOUND CARE | Facility: HOSPITAL | Age: 35
End: 2020-08-31
Attending: NURSE PRACTITIONER
Payer: MEDICARE

## 2020-08-31 DIAGNOSIS — Z79.4 LONG TERM (CURRENT) USE OF INSULIN (HCC): ICD-10-CM

## 2020-08-31 DIAGNOSIS — E11.621 TYPE 2 DIABETES MELLITUS WITH FOOT ULCER (HCC): ICD-10-CM

## 2020-08-31 DIAGNOSIS — B95.62 METHICILLIN RESISTANT STAPHYLOCOCCUS AUREUS INFECTION AS THE CAUSE OF DISEASES CLASSIFIED ELSEWHERE: ICD-10-CM

## 2020-08-31 DIAGNOSIS — M86.171 OTHER ACUTE OSTEOMYELITIS, RIGHT ANKLE AND FOOT (HCC): Primary | ICD-10-CM

## 2020-08-31 DIAGNOSIS — L97.516 NON-PRESSURE CHRONIC ULCER OF OTHER PART OF RIGHT FOOT WITH BONE INVOLVEMENT WITHOUT EVIDENCE OF NECROSIS (HCC): ICD-10-CM

## 2020-08-31 DIAGNOSIS — Z20.828 CONTACT WITH AND (SUSPECTED) EXPOSURE TO OTHER VIRAL COMMUNICABLE DISEASES: ICD-10-CM

## 2020-08-31 DIAGNOSIS — L97.509 TYPE 2 DIABETES MELLITUS WITH FOOT ULCER (HCC): ICD-10-CM

## 2020-08-31 LAB — GLUCOSE BLD-MCNC: 136 MG/DL (ref 70–99)

## 2020-08-31 PROCEDURE — 82962 GLUCOSE BLOOD TEST: CPT

## 2020-08-31 PROCEDURE — 97605 NEG PRS WND THER DME<=50SQCM: CPT

## 2020-09-01 LAB — SARS-COV-2 RNA RESP QL NAA+PROBE: NOT DETECTED

## 2020-09-02 ENCOUNTER — LAB ENCOUNTER (OUTPATIENT)
Dept: LAB | Facility: HOSPITAL | Age: 35
End: 2020-09-02
Attending: PODIATRIST
Payer: MEDICARE

## 2020-09-02 ENCOUNTER — LAB REQUISITION (OUTPATIENT)
Dept: LAB | Facility: HOSPITAL | Age: 35
End: 2020-09-02
Payer: MEDICARE

## 2020-09-02 DIAGNOSIS — N18.6 END STAGE KIDNEY DISEASE (HCC): ICD-10-CM

## 2020-09-02 DIAGNOSIS — M86.171 OTHER ACUTE OSTEOMYELITIS, RIGHT ANKLE AND FOOT (HCC): ICD-10-CM

## 2020-09-02 DIAGNOSIS — Z20.828 EXPOSURE TO SARS-ASSOCIATED CORONAVIRUS: Primary | ICD-10-CM

## 2020-09-02 LAB — POTASSIUM SERPL-SCNC: 4.6 MMOL/L (ref 3.5–5.1)

## 2020-09-02 PROCEDURE — 87186 SC STD MICRODIL/AGAR DIL: CPT | Performed by: PODIATRIST

## 2020-09-02 PROCEDURE — 88311 DECALCIFY TISSUE: CPT | Performed by: PODIATRIST

## 2020-09-02 PROCEDURE — 88305 TISSUE EXAM BY PATHOLOGIST: CPT | Performed by: PODIATRIST

## 2020-09-02 PROCEDURE — 84132 ASSAY OF SERUM POTASSIUM: CPT

## 2020-09-02 PROCEDURE — 87077 CULTURE AEROBIC IDENTIFY: CPT | Performed by: PODIATRIST

## 2020-09-02 PROCEDURE — 87205 SMEAR GRAM STAIN: CPT | Performed by: PODIATRIST

## 2020-09-02 PROCEDURE — 36415 COLL VENOUS BLD VENIPUNCTURE: CPT

## 2020-09-02 PROCEDURE — 87070 CULTURE OTHR SPECIMN AEROBIC: CPT | Performed by: PODIATRIST

## 2020-09-02 PROCEDURE — 87075 CULTR BACTERIA EXCEPT BLOOD: CPT | Performed by: PODIATRIST

## 2020-09-02 RX ORDER — DOXYCYCLINE HYCLATE 100 MG
100 TABLET ORAL 2 TIMES DAILY
Qty: 30 TABLET | Refills: 0 | Status: SHIPPED | OUTPATIENT
Start: 2020-09-02 | End: 2020-10-02 | Stop reason: ALTCHOICE

## 2020-09-02 NOTE — TELEPHONE ENCOUNTER
Please call in the following  Doxycycline 100 mg tablets 2 capsules  Dispense 30  Instructions take 1 tablet twice daily p.o. by mouth

## 2020-09-03 ENCOUNTER — TELEPHONE (OUTPATIENT)
Dept: PODIATRY CLINIC | Facility: CLINIC | Age: 35
End: 2020-09-03

## 2020-09-03 NOTE — TELEPHONE ENCOUNTER
JOHNY Gonzalez    Procedure date: 09/02/20  Follow-up appt date: Pt is scheduled for 09/04/20 at 1:00pm at [facility] office. Spoke to pt and he denies any pain or fever. States dressing has some dried blood on bandage.  Advised pt to stay off of foot and keep f

## 2020-09-04 ENCOUNTER — OFFICE VISIT (OUTPATIENT)
Dept: PODIATRY CLINIC | Facility: CLINIC | Age: 35
End: 2020-09-04
Payer: MEDICARE

## 2020-09-04 DIAGNOSIS — Z98.890 POST-OPERATIVE STATE: Primary | ICD-10-CM

## 2020-09-04 PROCEDURE — 99024 POSTOP FOLLOW-UP VISIT: CPT | Performed by: PODIATRIST

## 2020-09-07 NOTE — PROGRESS NOTES
Lisa Armas is a 38year old male. Patient presents with:  Post-Op: 1st post op visit from 1st metatarsal head resection right on 9/2. Denies any pain, numbness, or swelling. Has noticed dark red blood.   AM blood sugar level was 150        HPI: reviewed. No pertinent family history.    Social History    Socioeconomic History      Marital status: Single      Spouse name: Not on file      Number of children: Not on file      Years of education: Not on file      Highest education level: Not on file

## 2020-09-09 ENCOUNTER — OFFICE VISIT (OUTPATIENT)
Dept: PODIATRY CLINIC | Facility: CLINIC | Age: 35
End: 2020-09-09
Payer: MEDICARE

## 2020-09-09 ENCOUNTER — TELEPHONE (OUTPATIENT)
Dept: NEPHROLOGY | Facility: CLINIC | Age: 35
End: 2020-09-09

## 2020-09-09 VITALS — WEIGHT: 195 LBS | BODY MASS INDEX: 26.41 KG/M2 | HEIGHT: 72 IN

## 2020-09-09 DIAGNOSIS — Z98.890 POST-OPERATIVE STATE: Primary | ICD-10-CM

## 2020-09-09 PROCEDURE — 99024 POSTOP FOLLOW-UP VISIT: CPT | Performed by: PODIATRIST

## 2020-09-09 RX ORDER — METOPROLOL SUCCINATE 25 MG/1
25 TABLET, EXTENDED RELEASE ORAL DAILY
Qty: 30 TABLET | Refills: 11 | Status: SHIPPED | OUTPATIENT
Start: 2020-09-09

## 2020-09-15 ENCOUNTER — OFFICE VISIT (OUTPATIENT)
Dept: PODIATRY CLINIC | Facility: CLINIC | Age: 35
End: 2020-09-15
Payer: MEDICARE

## 2020-09-15 DIAGNOSIS — Z98.890 STATUS POST FOOT SURGERY: ICD-10-CM

## 2020-09-15 DIAGNOSIS — S91.301S OPEN WOUND OF RIGHT FOOT, SEQUELA: ICD-10-CM

## 2020-09-15 DIAGNOSIS — Z98.890 POST-OPERATIVE STATE: Primary | ICD-10-CM

## 2020-09-15 DIAGNOSIS — L97.511 SKIN ULCER OF RIGHT FOOT, LIMITED TO BREAKDOWN OF SKIN (HCC): ICD-10-CM

## 2020-09-15 DIAGNOSIS — M86.9 OSTEOMYELITIS OF METATARSAL (HCC): ICD-10-CM

## 2020-09-15 DIAGNOSIS — E11.42 DIABETIC PERIPHERAL NEUROPATHY (HCC): ICD-10-CM

## 2020-09-15 PROCEDURE — 99024 POSTOP FOLLOW-UP VISIT: CPT | Performed by: PODIATRIST

## 2020-09-15 NOTE — PROGRESS NOTES
Monica Sharma is a 38year old male. Patient presents with:  Post-Op: right foot sx on 9/2/20 - amputatuion of right great toe - denies any pain. HPI:     Patient is status post amputation of the right great toe.   He is over 2 weeks status post file      Years of education: Not on file      Highest education level: Not on file    Tobacco Use      Smoking status: Never Smoker      Smokeless tobacco: Never Used    Substance and Sexual Activity      Alcohol use: No        Alcohol/week: 0.0 standard Cirilo in a week. He was allowed to bathe the area and clean it but not soak it. The patient indicates understanding of these issues and agrees to the plan. Return in about 1 week (around 9/22/2020) for Follow-up with Dr. Darron Shahid.     [place] Mcqueen

## 2020-09-23 ENCOUNTER — OFFICE VISIT (OUTPATIENT)
Dept: PODIATRY CLINIC | Facility: CLINIC | Age: 35
End: 2020-09-23
Payer: MEDICARE

## 2020-09-23 DIAGNOSIS — S91.301S OPEN WOUND OF RIGHT FOOT, SEQUELA: ICD-10-CM

## 2020-09-23 DIAGNOSIS — Z98.890 STATUS POST FOOT SURGERY: ICD-10-CM

## 2020-09-23 DIAGNOSIS — Z98.890 POST-OPERATIVE STATE: Primary | ICD-10-CM

## 2020-09-23 DIAGNOSIS — L97.511 SKIN ULCER OF RIGHT FOOT, LIMITED TO BREAKDOWN OF SKIN (HCC): ICD-10-CM

## 2020-09-23 DIAGNOSIS — E11.42 DIABETIC PERIPHERAL NEUROPATHY (HCC): ICD-10-CM

## 2020-09-23 DIAGNOSIS — M86.9 OSTEOMYELITIS OF METATARSAL (HCC): ICD-10-CM

## 2020-09-23 PROCEDURE — 99024 POSTOP FOLLOW-UP VISIT: CPT | Performed by: PODIATRIST

## 2020-09-28 NOTE — PROGRESS NOTES
Cher Genao is a 38year old male. Patient presents with:  Post-Op: Blood sugar this . right foot sx on 9/2/2020. amputation of right great toe. denies any pain.         HPI:   Patient returns to clinic now little over 3 weeks status post surge on file      Number of children: Not on file      Years of education: Not on file      Highest education level: Not on file    Tobacco Use      Smoking status: Never Smoker      Smokeless tobacco: Never Used    Substance and Sexual Activity      Alcohol us

## 2020-10-02 ENCOUNTER — OFFICE VISIT (OUTPATIENT)
Dept: PODIATRY CLINIC | Facility: CLINIC | Age: 35
End: 2020-10-02
Payer: MEDICARE

## 2020-10-02 DIAGNOSIS — Z89.411 HISTORY OF PARTIAL RAY AMPUTATION OF FIRST TOE OF RIGHT FOOT (HCC): ICD-10-CM

## 2020-10-02 DIAGNOSIS — E11.42 DIABETIC PERIPHERAL NEUROPATHY (HCC): ICD-10-CM

## 2020-10-02 DIAGNOSIS — Z98.890 POST-OPERATIVE STATE: Primary | ICD-10-CM

## 2020-10-02 PROCEDURE — 99024 POSTOP FOLLOW-UP VISIT: CPT | Performed by: PODIATRIST

## 2020-10-04 NOTE — PROGRESS NOTES
Chin Rashid is a 38year old male. Patient presents with:  Post-Op: right foot sx on 9/2/2020 - BS this  - denies any pain.         HPI:   Patient returns to the clinic at today's visit now about 3 and half weeks status post surgery almost 4 do Years of education: Not on file      Highest education level: Not on file    Tobacco Use      Smoking status: Never Smoker      Smokeless tobacco: Never Used    Substance and Sexual Activity      Alcohol use: No        Alcohol/week: 0.0 standard drinks

## 2020-10-05 ENCOUNTER — LAB ENCOUNTER (OUTPATIENT)
Dept: LAB | Facility: HOSPITAL | Age: 35
End: 2020-10-05
Attending: SURGERY
Payer: MEDICARE

## 2020-10-05 DIAGNOSIS — Z01.818 PRE-OP TESTING: ICD-10-CM

## 2020-10-05 DIAGNOSIS — N18.6 ESRD (END STAGE RENAL DISEASE) (HCC): ICD-10-CM

## 2020-10-05 DIAGNOSIS — N17.9 ACUTE RENAL FAILURE, UNSPECIFIED ACUTE RENAL FAILURE TYPE (HCC): ICD-10-CM

## 2020-10-05 DIAGNOSIS — Z01.812 PRE-PROCEDURE LAB EXAM: Primary | ICD-10-CM

## 2020-10-05 DIAGNOSIS — I10 ESSENTIAL HYPERTENSION: ICD-10-CM

## 2020-10-05 DIAGNOSIS — N17.9 ACUTE KIDNEY INJURY (HCC): ICD-10-CM

## 2020-10-05 PROCEDURE — 85730 THROMBOPLASTIN TIME PARTIAL: CPT

## 2020-10-05 PROCEDURE — 85025 COMPLETE CBC W/AUTO DIFF WBC: CPT

## 2020-10-05 PROCEDURE — 85610 PROTHROMBIN TIME: CPT

## 2020-10-05 PROCEDURE — 36415 COLL VENOUS BLD VENIPUNCTURE: CPT

## 2020-10-05 PROCEDURE — 80053 COMPREHEN METABOLIC PANEL: CPT

## 2020-10-07 ENCOUNTER — ANESTHESIA EVENT (OUTPATIENT)
Dept: CARDIAC SURGERY | Facility: HOSPITAL | Age: 35
End: 2020-10-07
Payer: MEDICARE

## 2020-10-07 NOTE — ANESTHESIA PREPROCEDURE EVALUATION
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 4. Aortic valve: Trileaflet. No aortic stenosis. No aortic regurgitation. 5. Mitral valve: No mitral stenosis. Trivial mitral regurgitation.     Impressions:   This study is compared with previous dated 0 regular  Rate: normal  (-) murmur   Dental    No notable dental history. Pulmonary    Pulmonary exam normal.  Breath sounds clear to auscultation bilaterally.                Other findings            ASA: 3   Plan: general  NPO status verified and p

## 2020-10-08 ENCOUNTER — HOSPITAL ENCOUNTER (OUTPATIENT)
Facility: HOSPITAL | Age: 35
Setting detail: HOSPITAL OUTPATIENT SURGERY
Discharge: HOME | End: 2020-10-08
Attending: SURGERY | Admitting: SURGERY
Payer: MEDICARE

## 2020-10-08 ENCOUNTER — ANESTHESIA (OUTPATIENT)
Dept: CARDIAC SURGERY | Facility: HOSPITAL | Age: 35
End: 2020-10-08
Payer: MEDICARE

## 2020-10-08 VITALS
TEMPERATURE: 99 F | SYSTOLIC BLOOD PRESSURE: 201 MMHG | DIASTOLIC BLOOD PRESSURE: 104 MMHG | RESPIRATION RATE: 16 BRPM | HEIGHT: 70 IN | HEART RATE: 77 BPM | OXYGEN SATURATION: 100 % | WEIGHT: 195 LBS | BODY MASS INDEX: 27.92 KG/M2

## 2020-10-08 DIAGNOSIS — N17.9 ACUTE KIDNEY INJURY (HCC): ICD-10-CM

## 2020-10-08 DIAGNOSIS — N18.6 ESRD (END STAGE RENAL DISEASE) (HCC): ICD-10-CM

## 2020-10-08 DIAGNOSIS — Z01.818 PRE-OP TESTING: Primary | ICD-10-CM

## 2020-10-08 DIAGNOSIS — N17.9 ACUTE RENAL FAILURE, UNSPECIFIED ACUTE RENAL FAILURE TYPE (HCC): ICD-10-CM

## 2020-10-08 DIAGNOSIS — I10 ESSENTIAL HYPERTENSION: ICD-10-CM

## 2020-10-08 PROCEDURE — 03C80ZZ EXTIRPATION OF MATTER FROM LEFT BRACHIAL ARTERY, OPEN APPROACH: ICD-10-PCS | Performed by: SURGERY

## 2020-10-08 PROCEDURE — 80048 BASIC METABOLIC PNL TOTAL CA: CPT | Performed by: SURGERY

## 2020-10-08 PROCEDURE — 82962 GLUCOSE BLOOD TEST: CPT

## 2020-10-08 PROCEDURE — 88304 TISSUE EXAM BY PATHOLOGIST: CPT | Performed by: SURGERY

## 2020-10-08 PROCEDURE — 36415 COLL VENOUS BLD VENIPUNCTURE: CPT

## 2020-10-08 RX ORDER — GLYCOPYRROLATE 0.2 MG/ML
INJECTION, SOLUTION INTRAMUSCULAR; INTRAVENOUS AS NEEDED
Status: DISCONTINUED | OUTPATIENT
Start: 2020-10-08 | End: 2020-10-08 | Stop reason: SURG

## 2020-10-08 RX ORDER — BUPIVACAINE HYDROCHLORIDE 5 MG/ML
INJECTION, SOLUTION EPIDURAL; INTRACAUDAL AS NEEDED
Status: DISCONTINUED | OUTPATIENT
Start: 2020-10-08 | End: 2020-10-08 | Stop reason: HOSPADM

## 2020-10-08 RX ORDER — ONDANSETRON 2 MG/ML
INJECTION INTRAMUSCULAR; INTRAVENOUS AS NEEDED
Status: DISCONTINUED | OUTPATIENT
Start: 2020-10-08 | End: 2020-10-08 | Stop reason: SURG

## 2020-10-08 RX ORDER — DEXAMETHASONE SODIUM PHOSPHATE 4 MG/ML
VIAL (ML) INJECTION AS NEEDED
Status: DISCONTINUED | OUTPATIENT
Start: 2020-10-08 | End: 2020-10-08 | Stop reason: SURG

## 2020-10-08 RX ORDER — CISATRACURIUM BESYLATE 2 MG/ML
INJECTION INTRAVENOUS AS NEEDED
Status: DISCONTINUED | OUTPATIENT
Start: 2020-10-08 | End: 2020-10-08 | Stop reason: SURG

## 2020-10-08 RX ORDER — SODIUM CHLORIDE 9 MG/ML
INJECTION, SOLUTION INTRAVENOUS CONTINUOUS PRN
Status: DISCONTINUED | OUTPATIENT
Start: 2020-10-08 | End: 2020-10-08 | Stop reason: SURG

## 2020-10-08 RX ORDER — DEXTROSE MONOHYDRATE 25 G/50ML
50 INJECTION, SOLUTION INTRAVENOUS
Status: DISCONTINUED | OUTPATIENT
Start: 2020-10-08 | End: 2020-10-08

## 2020-10-08 RX ORDER — CEFAZOLIN SODIUM/WATER 2 G/20 ML
SYRINGE (ML) INTRAVENOUS AS NEEDED
Status: DISCONTINUED | OUTPATIENT
Start: 2020-10-08 | End: 2020-10-08 | Stop reason: SURG

## 2020-10-08 RX ORDER — LIDOCAINE HYDROCHLORIDE 10 MG/ML
INJECTION, SOLUTION EPIDURAL; INFILTRATION; INTRACAUDAL; PERINEURAL AS NEEDED
Status: DISCONTINUED | OUTPATIENT
Start: 2020-10-08 | End: 2020-10-08 | Stop reason: SURG

## 2020-10-08 RX ORDER — INSULIN ASPART 100 [IU]/ML
INJECTION, SOLUTION INTRAVENOUS; SUBCUTANEOUS ONCE
Status: DISCONTINUED | OUTPATIENT
Start: 2020-10-08 | End: 2020-10-08

## 2020-10-08 RX ORDER — HYDROCODONE BITARTRATE AND ACETAMINOPHEN 5; 325 MG/1; MG/1
1 TABLET ORAL AS NEEDED
Status: DISCONTINUED | OUTPATIENT
Start: 2020-10-08 | End: 2020-10-08

## 2020-10-08 RX ORDER — HYDROCODONE BITARTRATE AND ACETAMINOPHEN 5; 325 MG/1; MG/1
2 TABLET ORAL AS NEEDED
Status: DISCONTINUED | OUTPATIENT
Start: 2020-10-08 | End: 2020-10-08

## 2020-10-08 RX ORDER — SODIUM CHLORIDE, SODIUM LACTATE, POTASSIUM CHLORIDE, CALCIUM CHLORIDE 600; 310; 30; 20 MG/100ML; MG/100ML; MG/100ML; MG/100ML
INJECTION, SOLUTION INTRAVENOUS CONTINUOUS
Status: DISCONTINUED | OUTPATIENT
Start: 2020-10-08 | End: 2020-10-08

## 2020-10-08 RX ORDER — HEPARIN SODIUM 5000 [USP'U]/ML
INJECTION, SOLUTION INTRAVENOUS; SUBCUTANEOUS AS NEEDED
Status: DISCONTINUED | OUTPATIENT
Start: 2020-10-08 | End: 2020-10-08 | Stop reason: SURG

## 2020-10-08 RX ORDER — NALOXONE HYDROCHLORIDE 0.4 MG/ML
80 INJECTION, SOLUTION INTRAMUSCULAR; INTRAVENOUS; SUBCUTANEOUS AS NEEDED
Status: DISCONTINUED | OUTPATIENT
Start: 2020-10-08 | End: 2020-10-08

## 2020-10-08 RX ORDER — NEOSTIGMINE METHYLSULFATE 1 MG/ML
INJECTION INTRAVENOUS AS NEEDED
Status: DISCONTINUED | OUTPATIENT
Start: 2020-10-08 | End: 2020-10-08 | Stop reason: SURG

## 2020-10-08 RX ORDER — ONDANSETRON 2 MG/ML
4 INJECTION INTRAMUSCULAR; INTRAVENOUS ONCE AS NEEDED
Status: DISCONTINUED | OUTPATIENT
Start: 2020-10-08 | End: 2020-10-08

## 2020-10-08 RX ORDER — HYDROMORPHONE HYDROCHLORIDE 1 MG/ML
0.4 INJECTION, SOLUTION INTRAMUSCULAR; INTRAVENOUS; SUBCUTANEOUS EVERY 5 MIN PRN
Status: DISCONTINUED | OUTPATIENT
Start: 2020-10-08 | End: 2020-10-08

## 2020-10-08 RX ORDER — PROTAMINE SULFATE 10 MG/ML
INJECTION, SOLUTION INTRAVENOUS AS NEEDED
Status: DISCONTINUED | OUTPATIENT
Start: 2020-10-08 | End: 2020-10-08 | Stop reason: SURG

## 2020-10-08 RX ADMIN — SODIUM CHLORIDE: 9 INJECTION, SOLUTION INTRAVENOUS at 09:23:00

## 2020-10-08 RX ADMIN — ONDANSETRON 4 MG: 2 INJECTION INTRAMUSCULAR; INTRAVENOUS at 09:11:00

## 2020-10-08 RX ADMIN — LIDOCAINE HYDROCHLORIDE 50 MG: 10 INJECTION, SOLUTION EPIDURAL; INFILTRATION; INTRACAUDAL; PERINEURAL at 07:17:00

## 2020-10-08 RX ADMIN — PROTAMINE SULFATE 25 MG: 10 INJECTION, SOLUTION INTRAVENOUS at 09:04:00

## 2020-10-08 RX ADMIN — SODIUM CHLORIDE: 9 INJECTION, SOLUTION INTRAVENOUS at 08:59:00

## 2020-10-08 RX ADMIN — CEFAZOLIN SODIUM/WATER 2 G: 2 G/20 ML SYRINGE (ML) INTRAVENOUS at 07:31:00

## 2020-10-08 RX ADMIN — HEPARIN SODIUM 12000 UNITS: 5000 INJECTION, SOLUTION INTRAVENOUS; SUBCUTANEOUS at 08:07:00

## 2020-10-08 RX ADMIN — CISATRACURIUM BESYLATE 2 MG: 2 INJECTION INTRAVENOUS at 08:41:00

## 2020-10-08 RX ADMIN — DEXAMETHASONE SODIUM PHOSPHATE 4 MG: 4 MG/ML VIAL (ML) INJECTION at 07:23:00

## 2020-10-08 RX ADMIN — SODIUM CHLORIDE: 9 INJECTION, SOLUTION INTRAVENOUS at 07:11:00

## 2020-10-08 RX ADMIN — SODIUM CHLORIDE: 9 INJECTION, SOLUTION INTRAVENOUS at 08:34:00

## 2020-10-08 RX ADMIN — GLYCOPYRROLATE 0.8 MG: 0.2 INJECTION, SOLUTION INTRAMUSCULAR; INTRAVENOUS at 09:23:00

## 2020-10-08 RX ADMIN — CISATRACURIUM BESYLATE 10 MG: 2 INJECTION INTRAVENOUS at 07:18:00

## 2020-10-08 RX ADMIN — SODIUM CHLORIDE: 9 INJECTION, SOLUTION INTRAVENOUS at 07:42:00

## 2020-10-08 RX ADMIN — NEOSTIGMINE METHYLSULFATE 5 MG: 1 INJECTION INTRAVENOUS at 09:23:00

## 2020-10-08 NOTE — H&P
[address]    PATIENT'S NAME: Vianey Johnson   ATTENDING PHYSICIAN: Sharon Cole M.D.    PATIENT ACCOUNT#:   [de-identified]    LOCATION:  [address]  MEDICAL RECORD #:   [id number]       YOB: 1985  ADMISSION DATE:       10/08 hematemesis. No bright red blood per rectum. No hematuria, dysuria, hemoptysis, cough, or sputum production. PHYSICAL EXAMINATION:    GENERAL:  He is awake, alert. He is appropriate. No acute distress.    VITAL SIGNS:  Blood pressure 126/70, pulse i [address] [facility] [id number]  JJW/

## 2020-10-08 NOTE — OPERATIVE REPORT
[address]    PATIENT'S NAME: Sanjay Tatiana   ATTENDING PHYSICIAN: Rishabh Nobles M.D. OPERATING PHYSICIAN: Rishabh Nobles M.D.    PATIENT ACCOUNT#:   [de-identified]    LOCATION:  [address] [place] [address] 12 [address]  MEDICAL RECORD #:   [id number] had an incision near the axilla just above the previous scar down to the area of the graft and then over the previous scar at the brachial artery extended proximally and distally to expose and explore and control the brachial artery proximal and distal to both proximally and distally. The patient, with hemostasis obtained, received protamine, Gelfoam and thrombin, Bovie coagulation, hemoclips and then had the wounds closed in multiple layers of 2-0 and 3-0 Vicryl and 3-0 Vicryl subcuticular.   The patient r

## 2020-10-08 NOTE — ANESTHESIA PROCEDURE NOTES
Airway  Date/Time: 10/8/2020 7:20 AM  Urgency: elective      General Information and Staff    Patient location during procedure: OR  Anesthesiologist: Meryle Dubonnet., MD  Performed: anesthesiologist     Indications and Patient Condition  Indications for

## 2020-10-08 NOTE — ANESTHESIA POSTPROCEDURE EVALUATION
[address] Patient Status:  Hospital Outpatient Surgery   Age/Gender 38year old male MRN [id number]   Location [address] [facility] Attending Lalo José MD   Hosp Day # 0 [facility]       Anesthesia

## 2020-10-08 NOTE — OPERATIVE REPORT
Pre-op Dx: Occluded left arm graft. Post-op Dx: same with outflow stenosis/ occlusion. Procedure: Occluded left arm graft with possible axillary vein occlusion. Surgeon:Alfred/ Asst: Ashley Diaz. - unable to open- will need hybrid room to do

## 2020-10-08 NOTE — CONSULTS
[address] [place]    PATIENT'S NAME: Sherri    ATTENDING PHYSICIAN: DEBI Whiting Stager PHYSICIAN: Nolan Sanchez M.D.    PATIENT ACCOUNT#:   [de-identified]    LOCATION:  [address] [place] [address] 12 [address] 10  MEDICAL RECORD #:   [id number] next week.     Dictated By Greta Hwang M.D.  d: 10/08/2020 11:07:43  t: 10/08/2020 12:06:11  [facility] [id number]  ADDY/

## 2020-10-12 NOTE — DISCHARGE SUMMARY
[address]    PATIENT'S NAME: Francesca Rubio   ATTENDING PHYSICIAN: Dash Henry M.D.    PATIENT ACCOUNT#:   [de-identified]    LOCATION:  [address] [place] [address] 12 [address] 10  MEDICAL RECORD #:   [id number]       YOB: 1985  ADMISSION DATE

## 2020-10-16 ENCOUNTER — OFFICE VISIT (OUTPATIENT)
Dept: PODIATRY CLINIC | Facility: CLINIC | Age: 35
End: 2020-10-16
Payer: MEDICARE

## 2020-10-16 DIAGNOSIS — E11.42 DIABETIC PERIPHERAL NEUROPATHY (HCC): Primary | ICD-10-CM

## 2020-10-16 DIAGNOSIS — Z98.890 POST-OPERATIVE STATE: ICD-10-CM

## 2020-10-16 DIAGNOSIS — Z89.411 HISTORY OF PARTIAL RAY AMPUTATION OF FIRST TOE OF RIGHT FOOT (HCC): ICD-10-CM

## 2020-10-16 NOTE — PROGRESS NOTES
Debbi Kim is a 38year old male. Patient presents with:  Post-Op: right foot sx on 9/2/20 - BS this  - no pain.         HPI:   Patient is now 6 and half weeks status post surgery doing well moisturizing cream worked well he has had no drainag Socioeconomic History      Marital status: Single      Spouse name: Not on file      Number of children: Not on file      Years of education: Not on file      Highest education level: Not on file    Tobacco Use      Smoking status: Never Smoker      Smokel

## 2020-10-19 ENCOUNTER — TELEPHONE (OUTPATIENT)
Dept: NEPHROLOGY | Facility: CLINIC | Age: 35
End: 2020-10-19

## 2020-10-19 RX ORDER — AMLODIPINE BESYLATE 10 MG/1
10 TABLET ORAL DAILY
Qty: 90 TABLET | Refills: 3 | Status: SHIPPED | OUTPATIENT
Start: 2020-10-19 | End: 2021-10-28

## 2020-10-26 ENCOUNTER — APPOINTMENT (OUTPATIENT)
Dept: LAB | Facility: HOSPITAL | Age: 35
End: 2020-10-26
Attending: SURGERY
Payer: MEDICARE

## 2020-10-26 DIAGNOSIS — Z01.818 PRE-OP TESTING: ICD-10-CM

## 2020-11-07 ENCOUNTER — APPOINTMENT (OUTPATIENT)
Dept: LAB | Facility: HOSPITAL | Age: 35
End: 2020-11-07
Attending: SURGERY
Payer: MEDICARE

## 2020-11-07 DIAGNOSIS — N18.6 ESRD (END STAGE RENAL DISEASE) (HCC): ICD-10-CM

## 2020-11-10 ENCOUNTER — HOSPITAL ENCOUNTER (INPATIENT)
Dept: INTERVENTIONAL RADIOLOGY/VASCULAR | Facility: HOSPITAL | Age: 35
LOS: 5 days | Discharge: HOME | DRG: 314 | End: 2020-11-17
Attending: SURGERY | Admitting: SURGERY
Payer: MEDICARE

## 2020-11-10 DIAGNOSIS — N18.6 ESRD (END STAGE RENAL DISEASE) (HCC): Primary | ICD-10-CM

## 2020-11-10 PROCEDURE — 5A1D70Z PERFORMANCE OF URINARY FILTRATION, INTERMITTENT, LESS THAN 6 HOURS PER DAY: ICD-10-PCS | Performed by: INTERNAL MEDICINE

## 2020-11-10 PROCEDURE — 99222 1ST HOSP IP/OBS MODERATE 55: CPT | Performed by: STUDENT IN AN ORGANIZED HEALTH CARE EDUCATION/TRAINING PROGRAM

## 2020-11-10 RX ORDER — LISINOPRIL 20 MG/1
20 TABLET ORAL DAILY
Status: DISCONTINUED | OUTPATIENT
Start: 2020-11-10 | End: 2020-11-13

## 2020-11-10 RX ORDER — HEPARIN SODIUM 1000 [USP'U]/ML
1.5 INJECTION, SOLUTION INTRAVENOUS; SUBCUTANEOUS ONCE
Status: COMPLETED | OUTPATIENT
Start: 2020-11-10 | End: 2020-11-10

## 2020-11-10 RX ORDER — METOPROLOL SUCCINATE 25 MG/1
25 TABLET, EXTENDED RELEASE ORAL
Status: DISCONTINUED | OUTPATIENT
Start: 2020-11-11 | End: 2020-11-17

## 2020-11-10 RX ORDER — ATORVASTATIN CALCIUM 40 MG/1
40 TABLET, FILM COATED ORAL NIGHTLY
Status: DISCONTINUED | OUTPATIENT
Start: 2020-11-10 | End: 2020-11-17

## 2020-11-10 RX ORDER — AMLODIPINE BESYLATE 10 MG/1
10 TABLET ORAL DAILY
Status: DISCONTINUED | OUTPATIENT
Start: 2020-11-11 | End: 2020-11-17

## 2020-11-10 RX ORDER — CALCIUM ACETATE 667 MG/1
2 CAPSULE ORAL 3 TIMES DAILY
Status: DISCONTINUED | OUTPATIENT
Start: 2020-11-10 | End: 2020-11-17

## 2020-11-10 RX ORDER — CEFAZOLIN SODIUM/WATER 2 G/20 ML
2 SYRINGE (ML) INTRAVENOUS ONCE
Status: DISCONTINUED | OUTPATIENT
Start: 2020-11-10 | End: 2020-11-16 | Stop reason: ALTCHOICE

## 2020-11-10 RX ORDER — SODIUM CHLORIDE 9 MG/ML
INJECTION, SOLUTION INTRAVENOUS CONTINUOUS
Status: DISCONTINUED | OUTPATIENT
Start: 2020-11-10 | End: 2020-11-10

## 2020-11-10 RX ADMIN — CALCIUM ACETATE 1334 MG: 667 CAPSULE ORAL at 20:55:00

## 2020-11-10 RX ADMIN — HEPARIN SODIUM 1500 UNITS: 1000 INJECTION, SOLUTION INTRAVENOUS; SUBCUTANEOUS at 18:24:00

## 2020-11-10 RX ADMIN — LISINOPRIL 20 MG: 20 TABLET ORAL at 18:25:00

## 2020-11-10 RX ADMIN — CALCIUM ACETATE 1334 MG: 667 CAPSULE ORAL at 17:00:00

## 2020-11-10 RX ADMIN — HEPARIN SODIUM 1500 UNITS: 1000 INJECTION, SOLUTION INTRAVENOUS; SUBCUTANEOUS at 18:25:00

## 2020-11-10 RX ADMIN — ATORVASTATIN CALCIUM 40 MG: 40 TABLET, FILM COATED ORAL at 20:55:00

## 2020-11-10 NOTE — PROGRESS NOTES
Venogram reviewed- complete occlusion left axillary vein- distal to stent. Might place left arm graft fistula- possible placement right arm.  Discussed the surgery- risk/ complications- including death< MI, bleeding, infection, thrombosis, DVT, cardiac comp

## 2020-11-10 NOTE — PLAN OF CARE
Received into 7625 via GRIFFIN Ross 23 from PACU. A&OX4, Contact Isolation initiated for Hx MRSA in foot. Tele -SR. Denies pain or discomfort. Oriented to room & surroundings. Pt missed HD yesterday. HD ordered for today.   Nursing Hx & Assessment completed with asst

## 2020-11-10 NOTE — CONSULTS
[facility]  CONSULT     Lattie Gist Patient Status:  Outpatient in a Bed    1985 MRN [id number]   [facility] 7NE-A Attending Dominick Malave MD   Hosp Day # 0 PCP Dalton Leiva     Reason for consult: Medical Mgt    Reques nightly., Disp: , Rfl:     •  lisinopril 20 MG Oral Tab, Take 20 mg by mouth daily. , Disp: , Rfl:     •  calcium acetate 667 MG Oral Cap, Take 2 capsules by mouth 3 (three) times daily.   , Disp: , Rfl:     •  insulin glargine 100 UNIT/ML Subcutaneous Solut 11/10/20  1137   INR 1.1       No results for input(s): TROP, CK in the last 168 hours. Imaging: Imaging data reviewed in Epic. ASSESSMENT / PLAN:     1. Occluded AVF  1. AVF revision  Planned on 11/11  2. Vascular Sx  2.  ESRD on HD- MWF, missed Mo

## 2020-11-10 NOTE — PROGRESS NOTES
Received pt s/p left fistulagram. No sedation given. Pt's bed clean and ready. Called report to Anne Sandhoff, RN. Pt going to 5653. Transport requested.

## 2020-11-11 ENCOUNTER — APPOINTMENT (OUTPATIENT)
Dept: GENERAL RADIOLOGY | Facility: HOSPITAL | Age: 35
DRG: 314 | End: 2020-11-11
Attending: HOSPITALIST
Payer: MEDICARE

## 2020-11-11 PROCEDURE — 99222 1ST HOSP IP/OBS MODERATE 55: CPT | Performed by: INTERNAL MEDICINE

## 2020-11-11 PROCEDURE — 71045 X-RAY EXAM CHEST 1 VIEW: CPT | Performed by: HOSPITALIST

## 2020-11-11 PROCEDURE — 99232 SBSQ HOSP IP/OBS MODERATE 35: CPT | Performed by: HOSPITALIST

## 2020-11-11 RX ORDER — LABETALOL HYDROCHLORIDE 5 MG/ML
10 INJECTION, SOLUTION INTRAVENOUS ONCE
Status: COMPLETED | OUTPATIENT
Start: 2020-11-11 | End: 2020-11-11

## 2020-11-11 RX ORDER — VANCOMYCIN 2 GRAM/500 ML IN 0.9 % SODIUM CHLORIDE INTRAVENOUS
25 ONCE
Status: COMPLETED | OUTPATIENT
Start: 2020-11-11 | End: 2020-11-11

## 2020-11-11 RX ORDER — ACETAMINOPHEN 325 MG/1
650 TABLET ORAL EVERY 6 HOURS PRN
Status: DISCONTINUED | OUTPATIENT
Start: 2020-11-11 | End: 2020-11-17

## 2020-11-11 RX ADMIN — ATORVASTATIN CALCIUM 40 MG: 40 TABLET, FILM COATED ORAL at 21:13:00

## 2020-11-11 RX ADMIN — CALCIUM ACETATE 1334 MG: 667 CAPSULE ORAL at 09:50:00

## 2020-11-11 RX ADMIN — CALCIUM ACETATE 1334 MG: 667 CAPSULE ORAL at 21:13:00

## 2020-11-11 RX ADMIN — LISINOPRIL 20 MG: 20 TABLET ORAL at 09:51:00

## 2020-11-11 RX ADMIN — ACETAMINOPHEN 650 MG: 325 TABLET ORAL at 12:59:00

## 2020-11-11 RX ADMIN — CALCIUM ACETATE 1334 MG: 667 CAPSULE ORAL at 16:00:00

## 2020-11-11 RX ADMIN — ACETAMINOPHEN 650 MG: 325 TABLET ORAL at 01:14:00

## 2020-11-11 RX ADMIN — LABETALOL HYDROCHLORIDE 10 MG: 5 INJECTION, SOLUTION INTRAVENOUS at 01:15:00

## 2020-11-11 RX ADMIN — VANCOMYCIN 2 GRAM/500 ML IN 0.9 % SODIUM CHLORIDE INTRAVENOUS 2000 MG: at 17:02:00

## 2020-11-11 RX ADMIN — AMLODIPINE BESYLATE 10 MG: 10 TABLET ORAL at 09:51:00

## 2020-11-11 RX ADMIN — METOPROLOL SUCCINATE 25 MG: 25 TABLET, EXTENDED RELEASE ORAL at 06:00:00

## 2020-11-11 RX ADMIN — ACETAMINOPHEN 650 MG: 325 TABLET ORAL at 06:02:00

## 2020-11-11 NOTE — PROGRESS NOTES
Assumed pt care @ 0730. Informed Dr. Clyde Fleischer regarding pt having low grade temp overnight. Surgery cancelled today. ID consulted. MRI left foot ordered, checklist completed. @ 1200   Pt temp 102.9F. Dr. Darvin Bro made aware.    Covid PCR ordered, I

## 2020-11-11 NOTE — CONSULTS
[facility]  Report of Consultation    Genie Camarenaler Patient Status:  Outpatient in a Bed    1985 MRN [id number]   [facility] 7NE-A Attending Leatha Linares MD   Hosp Day # 0 PCP Gisella Batista       Assessment / Plan:    1) ESRD- Facility-Administered Medications:   •  acetaminophen (TYLENOL) tab 650 mg, 650 mg, Oral, Q6H PRN  •  insulin detemir (LEVEMIR) 100 UNIT/ML flextouch 3 Units, 3 Units, Subcutaneous, Nightly  •  amLODIPine Besylate (NORVASC) tab 10 mg, 10 mg, Oral, Daily  • BUN 81 11/10/2020     11/10/2020    K 4.4 11/10/2020     11/10/2020    CO2 25.0 11/10/2020     11/10/2020    CA 7.9 11/10/2020    ALB 2.6 11/10/2020    ALKPHO 71 11/10/2020    BILT 0.4 11/10/2020    TP 6.5 11/10/2020    AST 13 11/10/2020

## 2020-11-11 NOTE — PROGRESS NOTES
No complaints- Had fever last night- 100.4 per nurse. Was going to place right arm prosthetic graft fistula- but with fever, will have ID consult/ probably cancel surgery. Discussed right arm fistula with the patient- who is agreeable.  He was not happy abo

## 2020-11-11 NOTE — PROGRESS NOTES
Pt A/O x 4. S/P dialysis last night with 2L out. Noted to remains Hypertensive with SBP sustaining on the 180's yet pt remains asymptomatic. Also noted to have mild temp of 100.2 subsided with P.O tylenol.  MD has been notified with elevated BP and x 1 dose

## 2020-11-11 NOTE — CONSULTS
[facility] Dosing Service    Initial Pharmacokinetic Consult for Vancomycin Dosing     Suzanne Daniel is a 38year old patient who is being treated for fever of unknown origin.   Pharmacy has been asked to dose Vancomycin by Dr. Chucky Smith    All

## 2020-11-11 NOTE — CONSULTS
[facility] Dosing Service  Antibiotic Dosing    Olivia Farrell is a 38year old for whom pharmacy is dosing Cefepime for treatment of  fever of unknown origin. .  Other antibiotics (Not dosed by pharmacy): Allergies: has No Known Allergies.     Vit

## 2020-11-11 NOTE — CONSULTS
INFECTIOUS DISEASE CONSULT NOTE    Christine Hess Patient Status:  Outpatient in a Bed    1985 MRN [id number]   [facility] 7NE-A Attending Lalo José MD   Hosp Day # 0 PCP Oral, Q6H PRN  •  [START ON 11/12/2020] epoetin sophia-epbx (RETACRIT) 77453 UNIT/ML injection 10,000 Units, 10,000 Units, Intravenous, Once in dialysis  •  insulin detemir (LEVEMIR) 100 UNIT/ML flextouch 3 Units, 3 Units, Subcutaneous, Nightly  •  amLODIPin ulceration, no drainage/erythema/induration    Laboratory Data:    Recent Labs   Lab 11/10/20  1540   RBC 2.98*   HGB 8.5*   HCT 26.8*   MCV 89.9   MCH 28.5   MCHC 31.7   RDW 17.0*   NEPRELIM 8.09*   WBC 10.0   .0     Recent Labs   Lab 11/10/20  154

## 2020-11-11 NOTE — PROGRESS NOTES
[facility]  Progress Note     Adrienne Mariano Patient Status:  Outpatient in a Bed    1985 MRN [id number]   [facility] 7NE-A Attending Eulogio Little MD   Hosp Day # 0 PCP Lorena Bedolla     Chief Complaint: AVF revision    S: P Oral Daily   • Metoprolol Succinate ER  25 mg Oral Daily Beta Blocker   • ceFAZolin  2 g Intravenous Once       ASSESSMENT / PLAN:     1. Occluded AVF  1. AVF revision. Held due to fever  2. Vascular Sx  2.  ESRD on HD- MWF, missed Monday HD due to access

## 2020-11-12 ENCOUNTER — APPOINTMENT (OUTPATIENT)
Dept: MRI IMAGING | Facility: HOSPITAL | Age: 35
DRG: 314 | End: 2020-11-12
Attending: INTERNAL MEDICINE
Payer: MEDICARE

## 2020-11-12 ENCOUNTER — APPOINTMENT (OUTPATIENT)
Dept: CV DIAGNOSTICS | Facility: HOSPITAL | Age: 35
DRG: 314 | End: 2020-11-12
Attending: HOSPITALIST
Payer: MEDICARE

## 2020-11-12 PROCEDURE — 99233 SBSQ HOSP IP/OBS HIGH 50: CPT | Performed by: INTERNAL MEDICINE

## 2020-11-12 PROCEDURE — 99233 SBSQ HOSP IP/OBS HIGH 50: CPT | Performed by: HOSPITALIST

## 2020-11-12 PROCEDURE — 93306 TTE W/DOPPLER COMPLETE: CPT | Performed by: HOSPITALIST

## 2020-11-12 PROCEDURE — 73718 MRI LOWER EXTREMITY W/O DYE: CPT | Performed by: INTERNAL MEDICINE

## 2020-11-12 RX ORDER — HEPARIN SODIUM 1000 [USP'U]/ML
1.5 INJECTION, SOLUTION INTRAVENOUS; SUBCUTANEOUS ONCE
Status: COMPLETED | OUTPATIENT
Start: 2020-11-12 | End: 2020-11-12

## 2020-11-12 RX ADMIN — CALCIUM ACETATE 1334 MG: 667 CAPSULE ORAL at 20:25:00

## 2020-11-12 RX ADMIN — ATORVASTATIN CALCIUM 40 MG: 40 TABLET, FILM COATED ORAL at 20:25:00

## 2020-11-12 RX ADMIN — METOPROLOL SUCCINATE 25 MG: 25 TABLET, EXTENDED RELEASE ORAL at 05:42:00

## 2020-11-12 RX ADMIN — CALCIUM ACETATE 1334 MG: 667 CAPSULE ORAL at 18:01:00

## 2020-11-12 RX ADMIN — ACETAMINOPHEN 650 MG: 325 TABLET ORAL at 09:48:00

## 2020-11-12 RX ADMIN — LISINOPRIL 20 MG: 20 TABLET ORAL at 09:48:00

## 2020-11-12 RX ADMIN — AMLODIPINE BESYLATE 10 MG: 10 TABLET ORAL at 09:48:00

## 2020-11-12 RX ADMIN — HEPARIN SODIUM 1500 UNITS: 1000 INJECTION, SOLUTION INTRAVENOUS; SUBCUTANEOUS at 09:48:00

## 2020-11-12 NOTE — CONSULTS
[facility] dosing service    Follow-up Pharmacokinetic Consult for Vancomycin Dosing     Olivia Farrell is a 38year old patient who is being treated for fever of unknown origin.    Patient is on day 2 of Vancomycin and is currently receiving 500 mg I

## 2020-11-12 NOTE — PLAN OF CARE
Dialysis this AM with 2L off  MRI ordered for today to r/o osteo of left foot/toe  COVID isolation discontinued, maintained contact isolation for MRSA  Labs umair by dialysis this am  Plan for IV vanco / maxipime  + blood cultures  Patient able to eat at th

## 2020-11-12 NOTE — PROGRESS NOTES
[facility]  Nephrology Progress Note    Debbi Kim Attending:  Li Shaffer MD       Assessment and Plan:    1) ESRD- due to longstanding DM 2- lytes / volume OK.  HD today (off schedule)     2) Diabetic foot ulcer / osteo- s/p R great toe a acetaminophen (TYLENOL) tab 650 mg, 650 mg, Oral, Q6H PRN    •  Vancomycin HCl (VANCOCIN) 500 mg in sodium chloride 0.9% 150 mL IVPB, 5 mg/kg, Intravenous, Once per day on Tue Thu Sat    •  Cefepime HCl (MAXIPIME) 1 g in sodium chloride 0.9% 100 mL MBP/add

## 2020-11-12 NOTE — PROGRESS NOTES
Received a call from MRI department for clarification on MRI order. Stating it needs to be changed to MRI foot instead of MRI leg if physician is r/o OM on foot. @7135 Paged ID to clarify order for MRI, awaiting for return call.      Endorsed to armen

## 2020-11-12 NOTE — H&P
INFECTIOUS DISEASE PROGRESS NOTE    Alvino Hawkins Patient Status:  Outpatient in a Bed    1985 MRN [id number]   [facility] 7NE-A Attending Ariela Groves MD   [facility] Day # 0 PC temperature source Oral, resp. rate 18, weight 196 lb 13.9 oz (89.3 kg), SpO2 98 %. General: Alert, oriented, NAD, nontoxic appearing  HEENT: Moist mucous membranes. EOMI. Neck: No lymphadenopathy. Supple.   Respiratory: Clear to auscultation bilatera bacteremia   - ? D/t new HD line (placed 11/9) vs new foot/toe wound   - await final culture and roberta 11/11   - follow repeat blood culture 11/12 to document clearance; will plan to repeat again in next few days.    - follow temp curve and WBC   - will need

## 2020-11-12 NOTE — PROGRESS NOTES
Surgery held yesterday due to low grade fever.  Last night 102 degrees- surgery cancelled/ reconsult ID- on ancef/ Cefipime

## 2020-11-12 NOTE — PROGRESS NOTES
[facility]  Progress Note     Lisa Cava Patient Status:  Outpatient in a Bed    1985 MRN [id number]   [facility] 7NE-A Attending Jenifer Shankar MD   Hosp Day # 0 PCP Angela Mosher     Chief Complaint: AVF revision    S: P atorvastatin  40 mg Oral Nightly   • calcium acetate  2 capsule Oral TID   • lisinopril  20 mg Oral Daily   • Metoprolol Succinate ER  25 mg Oral Daily Beta Blocker   • ceFAZolin  2 g Intravenous Once       ASSESSMENT / PLAN:     1. Occluded AVF  1.  AVF re

## 2020-11-12 NOTE — PROGRESS NOTES
Pt A/O x 4. Mild temp during the night. Pt has been on iv Abx. BC result has been reported to be + for  Gram + cocci in cluster in all 4 bottles. Also + for MRSA. Pt is not in any acute distress. No signs of KEANU. Denies pain and discomfort.  Pt monitored

## 2020-11-13 ENCOUNTER — TELEPHONE (OUTPATIENT)
Dept: PODIATRY CLINIC | Facility: CLINIC | Age: 35
End: 2020-11-13

## 2020-11-13 ENCOUNTER — APPOINTMENT (OUTPATIENT)
Dept: INTERVENTIONAL RADIOLOGY/VASCULAR | Facility: HOSPITAL | Age: 35
DRG: 314 | End: 2020-11-13
Attending: INTERNAL MEDICINE
Payer: MEDICARE

## 2020-11-13 ENCOUNTER — HOSPITAL ENCOUNTER (INPATIENT)
Dept: GENERAL RADIOLOGY | Facility: HOSPITAL | Age: 35
Discharge: HOME | DRG: 314 | End: 2020-11-13
Attending: SURGERY | Admitting: SURGERY
Payer: MEDICARE

## 2020-11-13 PROCEDURE — 05PYX3Z REMOVAL OF INFUSION DEVICE FROM UPPER VEIN, EXTERNAL APPROACH: ICD-10-PCS | Performed by: RADIOLOGY

## 2020-11-13 PROCEDURE — 71045 X-RAY EXAM CHEST 1 VIEW: CPT | Performed by: SURGERY

## 2020-11-13 PROCEDURE — 99233 SBSQ HOSP IP/OBS HIGH 50: CPT | Performed by: HOSPITALIST

## 2020-11-13 PROCEDURE — 99221 1ST HOSP IP/OBS SF/LOW 40: CPT | Performed by: INTERNAL MEDICINE

## 2020-11-13 PROCEDURE — 99221 1ST HOSP IP/OBS SF/LOW 40: CPT | Performed by: PODIATRIST

## 2020-11-13 PROCEDURE — 99233 SBSQ HOSP IP/OBS HIGH 50: CPT | Performed by: INTERNAL MEDICINE

## 2020-11-13 RX ORDER — LIDOCAINE HYDROCHLORIDE AND EPINEPHRINE 10; 10 MG/ML; UG/ML
INJECTION, SOLUTION INFILTRATION; PERINEURAL
Status: DISCONTINUED
Start: 2020-11-13 | End: 2020-11-13 | Stop reason: WASHOUT

## 2020-11-13 RX ADMIN — METOPROLOL SUCCINATE 25 MG: 25 TABLET, EXTENDED RELEASE ORAL at 06:00:00

## 2020-11-13 RX ADMIN — CALCIUM ACETATE 1334 MG: 667 CAPSULE ORAL at 09:28:00

## 2020-11-13 RX ADMIN — CALCIUM ACETATE 1334 MG: 667 CAPSULE ORAL at 21:08:00

## 2020-11-13 RX ADMIN — CALCIUM ACETATE 1334 MG: 667 CAPSULE ORAL at 16:33:00

## 2020-11-13 RX ADMIN — ATORVASTATIN CALCIUM 40 MG: 40 TABLET, FILM COATED ORAL at 21:08:00

## 2020-11-13 RX ADMIN — AMLODIPINE BESYLATE 10 MG: 10 TABLET ORAL at 09:28:00

## 2020-11-13 RX ADMIN — LISINOPRIL 20 MG: 20 TABLET ORAL at 09:28:00

## 2020-11-13 NOTE — PROCEDURES
[address] Patient Status:  Inpatient    1985 MRN [id number]   Location [address] [facility] Attending Courtney Concepcion MD   [facility] Day # 1 PCP Karen Flores         Brief Procedure Report    Pre-Operative Lynn Lamas

## 2020-11-13 NOTE — PROGRESS NOTES
[facility]  Nephrology Progress Note    Monica Cancel Attending:  Alexey White MD       Assessment and Plan:    1) ESRD- due to longstanding DM 2- lytes / volume OK.  Next Mon per usual routine     2) Diabetic foot ulcer / osteo- s/p R great to mg, Oral, Q6H PRN    •  Vancomycin HCl (VANCOCIN) 500 mg in sodium chloride 0.9% 150 mL IVPB, 5 mg/kg, Intravenous, Once per day on Tue Thu Sat    •  insulin detemir (LEVEMIR) 100 UNIT/ML flextouch 3 Units, 3 Units, Subcutaneous, Nightly    •  amLODIPine B

## 2020-11-13 NOTE — CONSULTS
[facility]    Report of Consultation    Kamila Jack Patient Status:  Inpatient    1985 MRN [id number]   [facility] 7NE-A Attending Mireille Mata MD   Hosp Day # 1 PCP Karl Balderrama     Date of Admission:  11/10/2020  Date of Units, Subcutaneous, Nightly  •  amLODIPine Besylate (NORVASC) tab 10 mg, 10 mg, Oral, Daily  •  atorvastatin (LIPITOR) tab 40 mg, 40 mg, Oral, Nightly  •  calcium acetate (PHOSLO) cap 1,334 mg, 2 capsule, Oral, TID  •  Metoprolol Succinate ER (Toprol XL) head is likely related to positioning of the right upper extremity and the presence of a catheter in this region. The visualized SVC and right brachiocephalic vein appear patent.   On the left there are numerous collaterals present within the distal aspect they can get him on some IV antibiotics I can always bring him back for surgery but for right now I really feel that he is being worked up for othe medical problems.   Will await further input I did discuss this with the hospitalist.    JORGE ALBERTO Cooper

## 2020-11-13 NOTE — PROGRESS NOTES
[facility]  Progress Note     Jaya Jacobo Patient Status:  Outpatient in a Bed    1985 MRN [id number]   [facility] 7NE-A Attending Oly Meraz MD   Hosp Day # 1 PCP Ranjit Carrera     Chief Complaint: AVF revision    S: P Epic.    Medications:   • vancomycin  5 mg/kg Intravenous Once per day on Tue Thu Sat   • insulin detemir  3 Units Subcutaneous Nightly   • amLODIPine Besylate  10 mg Oral Daily   • atorvastatin  40 mg Oral Nightly   • calcium acetate  2 capsule Oral TID

## 2020-11-13 NOTE — PROGRESS NOTES
INFECTIOUS DISEASE PROGRESS NOTE    Donya Lim Patient Status:  Outpatient in a Bed    1985 MRN [id number]   [facility] 7NE-A Attending Da Crow MD   [facility] Day # 1 PC 5th digit with ulceration, no active drainage, erythema or induration.     Laboratory Data:    Recent Labs   Lab 11/12/20  0813   RBC 3.17*   HGB 9.0*   HCT 28.4*   MCV 89.6   MCH 28.4   MCHC 31.7   RDW 16.8*   NEPRELIM 12.62*   WBC 14.3*   .0     Re fused in the 5th digit. These findings are fairly specific for osteomyelitis. The   marrow signal in the remaining bony structures in the foot is normal.   SOFT TISSUES: There is soft tissue ulceration over the distal 5th digit.  Flexor and extensor tendons

## 2020-11-13 NOTE — CONSULTS
[facility] Cardiology Consult Note    Donna Contrreas Patient Status:  Inpatient    1985 MRN [id number]   [facility] 7NE-A Attending Riddhi Muñiz MD   Hosp Day # 1 PCP Ana Santana     HPI:  57-year-old male with a past medical history Procedure Laterality Date   • A.V. FISTULA Left 10/8/2020    Performed by Elise Guerrero MD at [facility] CVOR   • A.V. FISTULA Left 3/12/2020    Performed by Elise Guerrero MD at [address] METATARSAL+TOE,SINGLE     • AV FISTULA REVISION, OPEN

## 2020-11-13 NOTE — PLAN OF CARE
Pt alert and oriented x4, NSR, on room air. Podiatry and cards consulted. Per Dr. Lor Denney, L 5th digit will need to be amputated, but is not emergent. Awaiting cards to see regarding possible PORTILLO to r/o vegetation. Per Dr. Maggie Villatoro removed by IR.  Ne

## 2020-11-13 NOTE — PROGRESS NOTES
Pt A/O x4. No episode of fever/ chills last night. Denies pain and discomfort. No noted signs of KEANU. LS remains CTA. VSS.  Pt monitored

## 2020-11-13 NOTE — CONSULTS
[address]    PATIENT'S NAME: José Miguel Nieto   ATTENDING PHYSICIAN: Oliver Honeycutt M.D.   [facility] PHYSICIAN: Oliver Honeycutt M.D.    PATIENT ACCOUNT#:   [de-identified]    LOCATION:  [address] [place] [address]  MEDICAL RECORD #:   [id number]       DATE OF BIRTH

## 2020-11-14 PROCEDURE — 99233 SBSQ HOSP IP/OBS HIGH 50: CPT | Performed by: HOSPITALIST

## 2020-11-14 PROCEDURE — 99233 SBSQ HOSP IP/OBS HIGH 50: CPT | Performed by: INTERNAL MEDICINE

## 2020-11-14 PROCEDURE — 99232 SBSQ HOSP IP/OBS MODERATE 35: CPT | Performed by: NURSE PRACTITIONER

## 2020-11-14 RX ADMIN — ATORVASTATIN CALCIUM 40 MG: 40 TABLET, FILM COATED ORAL at 21:18:00

## 2020-11-14 RX ADMIN — AMLODIPINE BESYLATE 10 MG: 10 TABLET ORAL at 08:50:00

## 2020-11-14 RX ADMIN — METOPROLOL SUCCINATE 25 MG: 25 TABLET, EXTENDED RELEASE ORAL at 06:19:00

## 2020-11-14 RX ADMIN — CALCIUM ACETATE 1334 MG: 667 CAPSULE ORAL at 08:50:00

## 2020-11-14 RX ADMIN — CALCIUM ACETATE 1334 MG: 667 CAPSULE ORAL at 15:04:00

## 2020-11-14 RX ADMIN — CALCIUM ACETATE 1334 MG: 667 CAPSULE ORAL at 21:18:00

## 2020-11-14 NOTE — PROGRESS NOTES
[facility]  Progress Note     Cher Genao Patient Status:  Outpatient in a Bed    1985 MRN [id number]   [facility] 7NE-A Attending Gisele Alberts MD   Hosp Day # 2 PCP Addy Mclean     Chief Complaint: AVF revision    S: f vancomycin  5 mg/kg Intravenous Once per day on Tue Thu Sat   • insulin detemir  3 Units Subcutaneous Nightly   • amLODIPine Besylate  10 mg Oral Daily   • atorvastatin  40 mg Oral Nightly   • calcium acetate  2 capsule Oral TID   • Metoprolol Succinate ER

## 2020-11-14 NOTE — PROGRESS NOTES
[facility]  Cardiology Progress Note    Edwena Lower Patient Status:  Inpatient    1985 MRN [id number]   [facility] 7NE-A Attending Salvador Alaniz MD   [facility] Day # 2 PCP Ardath Schwab     Subjective:  No cardiac complaints function was      normal.   4. Pulmonary arteries: Systolic pressure was at the upper limits of normal,      in the range of 30mm Hg to 35mm Hg. 5. Pericardium, extracardiac: There was no pericardial effusion.      Impressions:  This study is compared wit

## 2020-11-14 NOTE — PROGRESS NOTES
[facility]  Nephrology Progress Note    Cher Genao Attending:  Gisele Alberts MD       Assessment and Plan:    1) ESRD- due to longstanding DM 2- lytes / volume OK.  Next Mon per usual routine     2) Diabetic foot ulcer / osteo- s/p R great to 9.0 11/14/2020    ALB 2.8 11/14/2020    ALKPHO 106 11/14/2020    BILT 0.3 11/14/2020    TP 7.3 11/14/2020    AST 20 11/14/2020    ALT 34 11/14/2020    PGLU 219 11/13/2020       Imaging: All imaging studies reviewed.     Meds:     •  acetaminophen (TYLENOL)

## 2020-11-14 NOTE — PROGRESS NOTES
[facility]                INFECTIOUS DISEASE PROGRESS NOTE    Amrit Landrum Patient Status:  Inpatient    1985 MRN [id number]   [facility] 7NE-A Attending Chester Spain MD   [address] Day # 2 PCP Renata Snider     Antibiotics: 0.3   TP 6.5  --   --  7.3       No results found for: [facility] Encounter on 11/10/20   1.  BLOOD CULTURE     Status: None (Preliminary result)    Collection Time: 11/12/20  9:55 AM    Specimen: Blood,peripheral   Result Value Ref Ran

## 2020-11-15 PROCEDURE — 99233 SBSQ HOSP IP/OBS HIGH 50: CPT | Performed by: HOSPITALIST

## 2020-11-15 PROCEDURE — 99232 SBSQ HOSP IP/OBS MODERATE 35: CPT | Performed by: INTERNAL MEDICINE

## 2020-11-15 RX ADMIN — ATORVASTATIN CALCIUM 40 MG: 40 TABLET, FILM COATED ORAL at 21:57:00

## 2020-11-15 RX ADMIN — METOPROLOL SUCCINATE 25 MG: 25 TABLET, EXTENDED RELEASE ORAL at 05:31:00

## 2020-11-15 RX ADMIN — CALCIUM ACETATE 1334 MG: 667 CAPSULE ORAL at 08:50:00

## 2020-11-15 RX ADMIN — AMLODIPINE BESYLATE 10 MG: 10 TABLET ORAL at 08:50:00

## 2020-11-15 RX ADMIN — CALCIUM ACETATE 1334 MG: 667 CAPSULE ORAL at 21:57:00

## 2020-11-15 RX ADMIN — CALCIUM ACETATE 1334 MG: 667 CAPSULE ORAL at 16:16:00

## 2020-11-15 NOTE — PLAN OF CARE
A/OX4, RA, VSS, NSR per Tele  Denies pain at this time  Afebrile, no complaints at this time  Per ID no need for PORTILLO   Repeat BC drawn   Needs attended to, Will cont to monitor.        Problem: RISK FOR INFECTION - ADULT  Goal: Absence of fever/infection du

## 2020-11-15 NOTE — PLAN OF CARE
Assumed care @ 1900. Patient alert oriented x4. On telemetry monitoring. Denies SOB, Denies any pain. Ensures patient safety. Maintained a calm and safe environment. Needs attended. Call light within reach, will continue to monitor.    Left in bed r

## 2020-11-15 NOTE — PROGRESS NOTES
[facility]  Nephrology Progress Note    Daphnie French Attending:  Sandi Man MD       Assessment and Plan:    1) ESRD- due to longstanding DM 2- lytes / volume OK.  Anticipate permcath + HD Mon if OK with ID     2) Diabetic foot ulcer / osteo Sat    •  insulin detemir (LEVEMIR) 100 UNIT/ML flextouch 3 Units, 3 Units, Subcutaneous, Nightly    •  amLODIPine Besylate (NORVASC) tab 10 mg, 10 mg, Oral, Daily    •  atorvastatin (LIPITOR) tab 40 mg, 40 mg, Oral, Nightly    •  calcium acetate (PHOSLO)

## 2020-11-15 NOTE — PLAN OF CARE
A/OX4, RA, VSS, NSR per Tele  Denies pain at this time  Plan for PORTILLO, permacath placement and HD tomorrow  NPO after midnight  Needs attended to, Will cont to monitor.        Problem: RISK FOR INFECTION - ADULT  Goal: Absence of fever/infection during antic

## 2020-11-15 NOTE — PROGRESS NOTES
[facility]  Progress Note     James Bonds Patient Status:  Outpatient in a Bed    1985 MRN [id number]   [facility] 7NE-A Attending Elise Guerrero MD   Hosp Day # 3 PCP Tracy Gore     Chief Complaint: AVF revision    S: n [START ON 11/16/2020] epoetin sophia-epbx  10,000 Units Intravenous Once in dialysis   • vancomycin  5 mg/kg Intravenous Once per day on Tue Thu Sat   • insulin detemir  3 Units Subcutaneous Nightly   • amLODIPine Besylate  10 mg Oral Daily   • atorvastatin

## 2020-11-16 ENCOUNTER — APPOINTMENT (OUTPATIENT)
Dept: INTERVENTIONAL RADIOLOGY/VASCULAR | Facility: HOSPITAL | Age: 35
DRG: 314 | End: 2020-11-16
Attending: INTERNAL MEDICINE
Payer: MEDICARE

## 2020-11-16 ENCOUNTER — APPOINTMENT (OUTPATIENT)
Dept: CV DIAGNOSTICS | Facility: HOSPITAL | Age: 35
DRG: 314 | End: 2020-11-16
Attending: INTERNAL MEDICINE
Payer: MEDICARE

## 2020-11-16 PROCEDURE — 99233 SBSQ HOSP IP/OBS HIGH 50: CPT | Performed by: HOSPITALIST

## 2020-11-16 PROCEDURE — 02H633Z INSERTION OF INFUSION DEVICE INTO RIGHT ATRIUM, PERCUTANEOUS APPROACH: ICD-10-PCS | Performed by: RADIOLOGY

## 2020-11-16 PROCEDURE — 93325 DOPPLER ECHO COLOR FLOW MAPG: CPT | Performed by: INTERNAL MEDICINE

## 2020-11-16 PROCEDURE — 93312 ECHO TRANSESOPHAGEAL: CPT | Performed by: INTERNAL MEDICINE

## 2020-11-16 PROCEDURE — 99152 MOD SED SAME PHYS/QHP 5/>YRS: CPT | Performed by: INTERNAL MEDICINE

## 2020-11-16 PROCEDURE — B513YZZ FLUOROSCOPY OF RIGHT JUGULAR VEINS USING OTHER CONTRAST: ICD-10-PCS | Performed by: RADIOLOGY

## 2020-11-16 PROCEDURE — 93320 DOPPLER ECHO COMPLETE: CPT | Performed by: INTERNAL MEDICINE

## 2020-11-16 PROCEDURE — B548ZZA ULTRASONOGRAPHY OF SUPERIOR VENA CAVA, GUIDANCE: ICD-10-PCS | Performed by: RADIOLOGY

## 2020-11-16 PROCEDURE — B24BZZ4 ULTRASONOGRAPHY OF HEART WITH AORTA, TRANSESOPHAGEAL: ICD-10-PCS | Performed by: INTERNAL MEDICINE

## 2020-11-16 PROCEDURE — 99233 SBSQ HOSP IP/OBS HIGH 50: CPT | Performed by: INTERNAL MEDICINE

## 2020-11-16 RX ORDER — HEPARIN SODIUM 5000 [USP'U]/ML
INJECTION, SOLUTION INTRAVENOUS; SUBCUTANEOUS
Status: COMPLETED
Start: 2020-11-16 | End: 2020-11-16

## 2020-11-16 RX ORDER — LIDOCAINE HYDROCHLORIDE AND EPINEPHRINE 10; 10 MG/ML; UG/ML
INJECTION, SOLUTION INFILTRATION; PERINEURAL
Status: COMPLETED
Start: 2020-11-16 | End: 2020-11-16

## 2020-11-16 RX ORDER — CEFAZOLIN SODIUM 1 G/3ML
INJECTION, POWDER, FOR SOLUTION INTRAMUSCULAR; INTRAVENOUS
Status: COMPLETED
Start: 2020-11-16 | End: 2020-11-16

## 2020-11-16 RX ORDER — LIDOCAINE HYDROCHLORIDE 10 MG/ML
INJECTION, SOLUTION INFILTRATION; PERINEURAL
Status: COMPLETED
Start: 2020-11-16 | End: 2020-11-16

## 2020-11-16 RX ORDER — HEPARIN SODIUM 1000 [USP'U]/ML
2000 INJECTION, SOLUTION INTRAVENOUS; SUBCUTANEOUS
Status: DISCONTINUED | OUTPATIENT
Start: 2020-11-16 | End: 2020-11-17

## 2020-11-16 RX ORDER — HYDRALAZINE HYDROCHLORIDE 20 MG/ML
5 INJECTION INTRAMUSCULAR; INTRAVENOUS ONCE
Status: COMPLETED | OUTPATIENT
Start: 2020-11-16 | End: 2020-11-16

## 2020-11-16 RX ORDER — MIDAZOLAM HYDROCHLORIDE 1 MG/ML
INJECTION INTRAMUSCULAR; INTRAVENOUS
Status: COMPLETED
Start: 2020-11-16 | End: 2020-11-16

## 2020-11-16 RX ORDER — MIDAZOLAM HYDROCHLORIDE 1 MG/ML
INJECTION INTRAMUSCULAR; INTRAVENOUS
Status: DISCONTINUED
Start: 2020-11-16 | End: 2020-11-16 | Stop reason: WASHOUT

## 2020-11-16 RX ADMIN — HYDRALAZINE HYDROCHLORIDE 5 MG: 20 INJECTION INTRAMUSCULAR; INTRAVENOUS at 06:49:00

## 2020-11-16 RX ADMIN — METOPROLOL SUCCINATE 25 MG: 25 TABLET, EXTENDED RELEASE ORAL at 08:03:00

## 2020-11-16 RX ADMIN — ATORVASTATIN CALCIUM 40 MG: 40 TABLET, FILM COATED ORAL at 22:24:00

## 2020-11-16 RX ADMIN — CALCIUM ACETATE 1334 MG: 667 CAPSULE ORAL at 22:24:00

## 2020-11-16 RX ADMIN — HEPARIN SODIUM 2000 UNITS: 1000 INJECTION, SOLUTION INTRAVENOUS; SUBCUTANEOUS at 22:26:00

## 2020-11-16 RX ADMIN — AMLODIPINE BESYLATE 10 MG: 10 TABLET ORAL at 08:03:00

## 2020-11-16 NOTE — PROGRESS NOTES
INFECTIOUS DISEASE PROGRESS NOTE    Suzanne Daniel Patient Status:  Outpatient in a Bed    1985 MRN [id number]   [facility] 7NE-A Attending Khris Morales MD   [address] [facility] Day # 4 PC Old HD cath site with dressing in place- clean/dry/intact. L 5th digit with ulceration, no active drainage, erythema or induration. Non-tender to palpation.     Laboratory Data:    Recent Labs   Lab 11/14/20  0553 11/16/20  0501   RBC 3.60* 3.35*   HGB 10.2 dominant collateral was chosen for access. Using real-time ultrasound, single wall micropuncture access was made. Guidewire was advanced centrally under fluoroscopy. A venogram was   performed to demonstrate the central anatomy.  The collateral empties and PM   Finalized by (CST): Ines Suarez MD on 11/16/2020 at 12:46 PM       Antibiotics:  IV vanco    ASSESSMENT:  # MRSA bacteremia (2/2 Bcx + 11/11)   - ?  D/t new right foot/toe wound in view of OM of 5th digit vs. HD line (placed 11/9) given positive cx tip

## 2020-11-16 NOTE — CM/SW NOTE
Pt will need IV ABX with his HD. Pt goes to [facility] for HD [phone]. SW to fax pt's IV ABX orders to [facility] at [phone].   Pt should d/c tomorrow,

## 2020-11-16 NOTE — PLAN OF CARE
[facility] Cardiology Brief Procedure Note    Sedation: Versed/Fentanyl: 3 mg /75 mcg    Procedure time: 2:57-3:12 pm for a total of 15 minutes    I was asked to perform PORTILLO for possible mitral vegetation on patient on ESRD here with sepsis.     There is no eviden

## 2020-11-16 NOTE — PLAN OF CARE
Assumed care @ 1900. Patient alert oriented x4. On telemetry monitoring. Updated patient with plan of care, verbalizes understanding. Ensures patient safety. Maintained a calm and safe environment. Side rails up x2. Needs attended.   Call light with

## 2020-11-16 NOTE — PROGRESS NOTES
Status post PORTILLO procedure, pt. Awake and tolerating PO fluids. Pt received 3 mg Versed and 75mcg Fentanyl. Report called to [facility]. Pt. Transported back to bed by transport.

## 2020-11-16 NOTE — PROGRESS NOTES
[facility]  Progress Note     Chin Rashid Patient Status:  Outpatient in a Bed    1985 MRN [id number]   [facility] 7NE-A Attending Deric Antonio MD   Hosp Day # 4 PCP Ryann Iniguez     Chief Complaint: AVF revision    S: n Creatinine Clearance: 8.2 mL/min (A) (based on SCr of 12.8 mg/dL (H)). Recent Labs   Lab 11/10/20  1137   INR 1.1       No results for input(s): TROP, CK in the last 168 hours. Imaging: Imaging data reviewed in Epic.     Medications:   • iohexol

## 2020-11-16 NOTE — PROGRESS NOTES
[facility]  Nephrology Progress Note    Annika Duval Attending:  Kati Puga MD       Assessment and Plan:    1) ESRD- due to longstanding DM 2- lytes / volume OK.  Permcath + HD today      2) Diabetic foot ulcer / osteo- s/p R great toe amp- 11/16/2020    K 4.5 11/16/2020     11/16/2020    CO2 24.0 11/16/2020     11/16/2020    CA 8.7 11/16/2020    PGLU 138 11/16/2020       Imaging: All imaging studies reviewed.     Meds:     •  epoetin sophia-epbx (RETACRIT) 52812 UNIT/ML injection

## 2020-11-16 NOTE — PROGRESS NOTES
Pharmacy Dosing Service  Follow-up Pharmacokinetic Consult for Vancomycin Dosing          Marlinda Romberg is a 38year old male who is being treated for MRSA bacteremia. Vancomycin day #6      Goal trough is 15-20 ug/mL.     He has No Known Aller

## 2020-11-16 NOTE — PROCEDURES
Cardiology Transesophageal Echo Note    PRE-PROCEDURE DIAGNOSIS: MRSA bacteremia    PROCEDURE: Transesophageal Echocardiogram.    SEDATION:   Topical spray x 1  Versed: 3 mg  Fentanyl: 75 mcg    I personally supervised the intravenous administration of   c Initial visit to assess pt's spiritual needs. Pt   Sleeping, no family member present. Left a card.       Chaplain Ada Mi MDiv,ThM,PhD

## 2020-11-17 VITALS
SYSTOLIC BLOOD PRESSURE: 145 MMHG | OXYGEN SATURATION: 98 % | WEIGHT: 190.69 LBS | RESPIRATION RATE: 15 BRPM | TEMPERATURE: 98 F | HEART RATE: 75 BPM | BODY MASS INDEX: 27 KG/M2 | DIASTOLIC BLOOD PRESSURE: 81 MMHG

## 2020-11-17 PROCEDURE — 99232 SBSQ HOSP IP/OBS MODERATE 35: CPT | Performed by: NURSE PRACTITIONER

## 2020-11-17 PROCEDURE — 99239 HOSP IP/OBS DSCHRG MGMT >30: CPT | Performed by: HOSPITALIST

## 2020-11-17 PROCEDURE — 99232 SBSQ HOSP IP/OBS MODERATE 35: CPT | Performed by: PODIATRIST

## 2020-11-17 PROCEDURE — 99232 SBSQ HOSP IP/OBS MODERATE 35: CPT | Performed by: INTERNAL MEDICINE

## 2020-11-17 RX ADMIN — AMLODIPINE BESYLATE 10 MG: 10 TABLET ORAL at 08:19:00

## 2020-11-17 RX ADMIN — CALCIUM ACETATE 1334 MG: 667 CAPSULE ORAL at 08:19:00

## 2020-11-17 RX ADMIN — METOPROLOL SUCCINATE 25 MG: 25 TABLET, EXTENDED RELEASE ORAL at 05:15:00

## 2020-11-17 NOTE — PROGRESS NOTES
[facility]  Nephrology Progress Note    Chin Rashid Attending:  Deric Antonio MD       Assessment and Plan:    1) ESRD- due to longstanding DM 2- lytes / volume OK.  S/p permcath + HD yesterday- next HD Wed per usual routine     2) Diabetic fo (TYLENOL) tab 650 mg, 650 mg, Oral, Q6H PRN    •  Vancomycin HCl (VANCOCIN) 500 mg in sodium chloride 0.9% 150 mL IVPB, 5 mg/kg, Intravenous, Once per day on Tue Thu Sat    •  insulin detemir (LEVEMIR) 100 UNIT/ML flextouch 3 Units, 3 Units, Subcutaneous,

## 2020-11-17 NOTE — PLAN OF CARE
Assumed pt care @ 0730. A&Ox4. VSS. Denies pain. On room air. NSR on telemetry. Tolerating diet. Denies nausea/vomiting. NSR on telemetry. Up ad leslie with steady gait. Will continue to monitor.      Plan d/c today    Problem: Diabetes/Glucose Control  Goal:

## 2020-11-17 NOTE — PLAN OF CARE
PT A/O, SR, HD COMPLETED, 2000 OFF, VANCO GIVEN, RT 5TH TOE D/I, DENIES PAIN, ISOLATION MAINTAINED, INSTRUCTED PT ON POC    Problem: RISK FOR INFECTION - ADULT  Goal: Absence of fever/infection during anticipated neutropenic period  Description: INTERVENTI

## 2020-11-17 NOTE — PROGRESS NOTES
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   Mri Foot, Left (dor=[id number])      Laboratory Data:  Lab Results   Component Value Date    PGLU 146 11/16/2020       Impression and Plan:  Patient Active Problem List:     Back pain     Left leg weakness     Skin ulcer of r

## 2020-11-17 NOTE — PLAN OF CARE
NURSING DISCHARGE NOTE    Discharged Home via Wheelchair. Accompanied by Support staff  Belongings Taken by patient/family. Discharge paper work including work note given to & discussed with pt PTD. Pt verbalized understanding of d/c & follow-up.

## 2020-11-17 NOTE — PROGRESS NOTES
[facility]  Cardiology Progress Note    Lilli Sake Patient Status:  Inpatient    1985 MRN [id number]   [facility] 7NE-A Attending Deborah Bloom MD   Hosp Day # 5 PCP Ankit Celeste     Subjective:  No complaints of chest pain

## 2020-11-17 NOTE — CM/SW NOTE
MSW faxed IV abx script to [facility] [phone]. Fax confirmation placed on patient's hard chart with script. MSW also called facility and spoke with Maggie Sims. She received script and has medication at site.     Everardo Segundo LCSW

## 2020-11-17 NOTE — PROGRESS NOTES
INFECTIOUS DISEASE PROGRESS NOTE    Marlinda Romberg Patient Status:  Outpatient in a Bed    1985 MRN [id number]   [facility] 7NE-A Attending Quentin Callaway MD   [address] [facility] Day # 5 [facility] Non-tender to palpation.     Laboratory Data:    Recent Labs   Lab 11/14/20  0553 11/16/20  0501   RBC 3.60* 3.35*   HGB 10.2* 9.7*   HCT 32.7* 30.2*   MCV 90.8 90.1   MCH 28.3 29.0   MCHC 31.2 32.1   RDW 16.7* 16.4*   NEPRELIM 4.54  --    WBC 6.6 6.8   PLT not see patient as already discharged

## 2020-11-18 NOTE — DISCHARGE SUMMARY
[facility]  DISCHARGE SUMMARY     Jaden Lynn Patient Status:  Inpatient    1985 MRN [id number]   [facility] 7NE-A Attending No att. providers found   Hosp Day # 5 SUNNY Lee     Date of Admission: 11/10/2020  Date of List:     Discharge Medications      START taking these medications      Instructions Prescription details   Vancomycin HCl 500 mg in sodium chloride 150 mL  Notes to patient: AS DIRECTED      Inject 500 mg into the vein See Admin Instructions.  To be given Glenna  [phone]    In 3 weeks  Please call to make an appointment    [facility] [id number] [phone]    Schedule an appointment as soon as possible for a visit      JORGE ALBERTO Oakley

## 2020-11-27 ENCOUNTER — OFFICE VISIT (OUTPATIENT)
Dept: PODIATRY CLINIC | Facility: CLINIC | Age: 35
End: 2020-11-27
Payer: MEDICARE

## 2020-11-27 ENCOUNTER — TELEPHONE (OUTPATIENT)
Dept: PODIATRY CLINIC | Facility: CLINIC | Age: 35
End: 2020-11-27

## 2020-11-27 DIAGNOSIS — L97.522 ULCER OF TOE, LEFT, WITH FAT LAYER EXPOSED (HCC): ICD-10-CM

## 2020-11-27 DIAGNOSIS — E11.42 DIABETIC PERIPHERAL NEUROPATHY (HCC): Primary | ICD-10-CM

## 2020-11-27 PROCEDURE — 99213 OFFICE O/P EST LOW 20 MIN: CPT | Performed by: PODIATRIST

## 2020-11-27 NOTE — TELEPHONE ENCOUNTER
Procedure: Amputation of fifth toe left (disarticulation at MTP joint)  CPT code: 44498  Length of Surgery: 30 minutes  Any Instruments: Mini power, mini fluoroscopy  Call patient: ASAP  Anesthesia: MAC  Location: [facility]  Assistance: none  Pacemaker: No  Ant

## 2020-11-29 NOTE — PROGRESS NOTES
Kamila Satniago is a 38year old male. Patient presents with: Follow - Up: 5th toe left foot ck - denies any pain - BS this . HPI:   Harvinder Loydflip for a checkup on his fifth toe denies any pain he has been working keeping it dressed.   At today's vi METATARSAL+TOE,SINGLE     • AV FISTULA REVISION, OPEN     • CATARACT     • OTHER      amputation of R great toe   • TOE AMPUTATION Right 7/3/2020    Performed by Cruzita Babinski, DPM at [address] [facility] [facility] [address]      History reviewed. No pertinent family history.    So get that scheduled information was sent to scheduling. At today's visit both conservative and surgical management was discussed for the diagnoses listed above. After a lengthy conversation the patient opted for surgery after questions were answered.   The sandrine

## 2020-11-30 NOTE — TELEPHONE ENCOUNTER
Called patient this date and scheduled him for surgery on 12/16/20 at [facility]. Reviewed pre and post op instructions with patient who voiced understanding. Sent instructions this date thru My Chart as well.   No prior auth needed as Medicare is his primary i

## 2020-12-01 ENCOUNTER — HOSPITAL ENCOUNTER (EMERGENCY)
Facility: HOSPITAL | Age: 35
Discharge: HOME | End: 2020-12-01
Attending: EMERGENCY MEDICINE
Payer: MEDICARE

## 2020-12-01 ENCOUNTER — APPOINTMENT (OUTPATIENT)
Dept: ULTRASOUND IMAGING | Facility: HOSPITAL | Age: 35
End: 2020-12-01
Attending: EMERGENCY MEDICINE
Payer: MEDICARE

## 2020-12-01 VITALS
OXYGEN SATURATION: 100 % | DIASTOLIC BLOOD PRESSURE: 97 MMHG | RESPIRATION RATE: 18 BRPM | BODY MASS INDEX: 26.41 KG/M2 | HEART RATE: 89 BPM | HEIGHT: 72 IN | SYSTOLIC BLOOD PRESSURE: 167 MMHG | WEIGHT: 195 LBS | TEMPERATURE: 99 F

## 2020-12-01 DIAGNOSIS — I82.621 ACUTE DEEP VEIN THROMBOSIS (DVT) OF BRACHIAL VEIN OF RIGHT UPPER EXTREMITY (HCC): Primary | ICD-10-CM

## 2020-12-01 PROCEDURE — 99284 EMERGENCY DEPT VISIT MOD MDM: CPT

## 2020-12-01 PROCEDURE — 93971 EXTREMITY STUDY: CPT | Performed by: EMERGENCY MEDICINE

## 2020-12-01 NOTE — ED PROVIDER NOTES
Patient Seen in: [facility] Emergency Department      History   Patient presents with:  Swelling Edema    Stated Complaint: arm swelling after IV last week    HPI    1 week of right upper extremity swelling.   Started shortly after being discharged f Temporal   SpO2 100 %   O2 Device None (Room air)       Current:BP (!) 198/111   Pulse 76   Temp 98.7 °F (37.1 °C) (Temporal)   Resp 18   Ht 182.9 cm (6')   Wt 88.5 kg   SpO2 100%   BMI 26.45 kg/m²         Physical Exam      Constitutional: Awake, alert, a (As transcribed by Technologist)  Patient has an ulceration in the fifth digit of the left foot. FINDINGS:  BONES:  There is diffuse marrow edema involving the phalanges of the 5th digit.   There is indistinctness of cortex involving middle and distal ph phasicity, and augmentation of the subclavian, axillary, basilic, and cephalic veins. Lack of complete compressibility involves internal jugular and 1 of the brachial veins with partial thrombus. CONCLUSION:  1.  Improvement of partial deep veno echocardiographic study. Transthoracic echocardiography for ventricular function evaluation and assessment of valvular function. Image quality was adequate.  ECG rhythm:   Normal sinus ------------------------------------------------------------------------ Aortic valve:   Structurally normal valve. Trileaflet. Doppler: Transvalvular velocity was within the normal range. There was no stenosis. Trivial regurgitation. Tricuspid valve:   Structurally normal valve.     Doppler:  Transvalvular velocity was within 2.1   cm/m^2 1. 5 - 2.3  LA ID, A-P, ES, MM              (H)     4.9   cm     3.0 - 4.0  LA ID/bsa, A-P, ES, MM                  2.3   cm/m^2 1. 5 - 2.3  LA/aortic root ratio, MM                1.58         ---------   Mitral valve MD on 11/13/2020 at 2:53 PM       Xr Chest Ap Portable  (cpt=71045)    Result Date: 11/11/2020  PROCEDURE:  XR CHEST AP PORTABLE  (CPT=71045)  TECHNIQUE:  AP chest radiograph was obtained.   COMPARISON:  [facility] , XR, XR CHEST AP PORTABLE  (CPT=71010), 9/22/ On the left within the upper arm numerous collaterals are present. This may be related to a known thrombosed graft in this region. There is a stent in the region of the basilic vein which appears completely occluded.   The cephalic vein has a tortuous cou Topical spray x 1 Versed: 3 mg Fentanyl: 75 mcg I personally supervised the intravenous administration of conscious sedation.  This was performed with continuous respiratory, hemodynamic, and electrocardiographic monitoring.  Sedation was supervised from 2 PRE-PROCEDURE DIAGNOSIS: MRSA bacteremia   PROCEDURE: Transesophageal Echocardiogram.   SEDATION: Topical spray x 1 Versed: 3 mg Fentanyl: 75 mcg   I personally supervised the intravenous administration of conscious sedation.  This was performed with ana The procedure was discussed with him in detail. Review of benefits, alternatives, and risks. Discussion of risks included, but was not limited to infection, bleeding, organ/nerve injury. All questions were answered.   He voiced understanding and wished t No immediate complication. Sterile dressings were placed. He was assessed and felt an adequate candidate for moderate sedation. My direct supervision. Continuous pulse oximetry and cardiac monitoring.   Intravenous Versed and fentanyl were given by the Emiliana Vaughan MD on 11/13/2020 at 7:19 PM     Finalized by (CST): Emiliana Vaughan MD on 11/13/2020 at 7:23 PM           MDM      Patient's history exam and radiology results were discussed with Dr. Nivia Yepez, hematology on-call.     Advised anticoagula

## 2020-12-01 NOTE — CM/SW NOTE
Emergency Department Discharge Plan    Monica Sharma is a 38year old male who presented to the ED with partial deep venous thrombosis involving the right internal jugular vein. .  ED Case Manager was asked to assist in arranging for home anticoagulati

## 2020-12-01 NOTE — ED INITIAL ASSESSMENT (HPI)
Pt presented to ED c/o right arm swelling and pain worsening since he was discharged from hospital on 11/18. Pt has obvious swelling to right arm. Had dialysis yesterday b/p elevated [phone] systolic [phone] diastolic.

## 2020-12-11 ENCOUNTER — TELEPHONE (OUTPATIENT)
Dept: PODIATRY CLINIC | Facility: CLINIC | Age: 35
End: 2020-12-11

## 2020-12-11 NOTE — TELEPHONE ENCOUNTER
That is fine but he needs to stop the blood thinners approximately 5 days before if it is Coumadin or if it is Eliquis or Xarelto at least 3 days before

## 2020-12-11 NOTE — TELEPHONE ENCOUNTER
Per Ivis Abrams, pt was seen earlier in ED for DVT and was put on blood thinners. Pt is scheduled for surgery on 12/16/20, wanting to make sure Dr. Karl Sparrow is made aware.  Please advise

## 2020-12-11 NOTE — TELEPHONE ENCOUNTER
Called pt and he states he is on eliquis 5mg for 30 days and it was rc'd by [facility]. He states he has not f/u with pcp yet. I advised him to call his pcp, Dr. Juan Osorio and get clearance to be off the eliquis for 3 days prior to sx. I informed him Dr. Hernandez Reina said we can continue with sx on 12/16 as long as he gets clearance to come off the eliquis.  He will f/u with us o Monday 12/14

## 2020-12-13 ENCOUNTER — LAB REQUISITION (OUTPATIENT)
Dept: LAB | Facility: HOSPITAL | Age: 35
End: 2020-12-13
Payer: MEDICARE

## 2020-12-13 DIAGNOSIS — Z01.818 ENCOUNTER FOR OTHER PREPROCEDURAL EXAMINATION: ICD-10-CM

## 2020-12-16 ENCOUNTER — LAB REQUISITION (OUTPATIENT)
Dept: LAB | Facility: HOSPITAL | Age: 35
End: 2020-12-16
Payer: MEDICARE

## 2020-12-16 DIAGNOSIS — Z89.422 ACQUIRED ABSENCE OF OTHER LEFT TOE(S) (HCC): ICD-10-CM

## 2020-12-16 DIAGNOSIS — Z20.828 CONTACT WITH AND (SUSPECTED) EXPOSURE TO OTHER VIRAL COMMUNICABLE DISEASES: ICD-10-CM

## 2020-12-16 PROCEDURE — 84132 ASSAY OF SERUM POTASSIUM: CPT | Performed by: ANESTHESIOLOGY

## 2020-12-16 PROCEDURE — 88311 DECALCIFY TISSUE: CPT | Performed by: PODIATRIST

## 2020-12-16 PROCEDURE — 88305 TISSUE EXAM BY PATHOLOGIST: CPT | Performed by: PODIATRIST

## 2020-12-17 ENCOUNTER — TELEPHONE (OUTPATIENT)
Dept: ORTHOPEDICS CLINIC | Facility: CLINIC | Age: 35
End: 2020-12-17

## 2020-12-17 NOTE — TELEPHONE ENCOUNTER
Procedure date: 12/16/2020  How are you feeling? Feeling fine  Any bleeding? No  Is the dressing dry & intact? Yes  Level of pain? 3/10  Character of pain: aching  Onset of pain:  Aggravating factors:  Duration of pain:  Alleviating factors:  Are you taking the prescribed medication? Yes  Are you following all of the PO instructions? Yes    Other Comments:  Post op call placed to patient. He is feeling well this morning, pain is minimal, has not had to take any tylenol. Dressing is dry and intact, following post op instructions. Follow-up appt  date: 12/23/2020    Pt was advised if they have any concerns after hours to call our office and they would be directed to on call physician.      Dr. Cherie Hall

## 2020-12-23 ENCOUNTER — OFFICE VISIT (OUTPATIENT)
Dept: PODIATRY CLINIC | Facility: CLINIC | Age: 35
End: 2020-12-23
Payer: MEDICARE

## 2020-12-23 DIAGNOSIS — Z98.890 STATUS POST FOOT SURGERY: Primary | ICD-10-CM

## 2020-12-23 PROCEDURE — 99024 POSTOP FOLLOW-UP VISIT: CPT | Performed by: PODIATRIST

## 2020-12-23 RX ORDER — APIXABAN 5 MG/1
2.5 TABLET, FILM COATED ORAL DAILY
Status: ON HOLD | COMMUNITY
Start: 2020-12-01 | End: 2021-02-12

## 2020-12-23 NOTE — PROGRESS NOTES
Juan C Schofield is a 38year old male. Patient presents with:  Post-Op: 1st s/p follow up, 12/16 sx, left toe. Patient denies any pain, swelling or d/c. Did not check BS this morning. HPI:   Patient returns to clinic now 1 week postop.   Some pa • Neuropathy    • Renal disorder       Past Surgical History:   Procedure Laterality Date   • A.V. FISTULA Left 10/8/2020    Performed by Elise Guerrero MD at [facility]   • A.V. FISTULA Left 3/12/2020    Performed by Elise Guerrero MD at [facility] CV[facility]   • AMPU these issues and agrees to the plan.     Tom Goldsmith, EMA

## 2020-12-29 ENCOUNTER — OFFICE VISIT (OUTPATIENT)
Dept: PODIATRY CLINIC | Facility: CLINIC | Age: 35
End: 2020-12-29
Payer: MEDICARE

## 2020-12-29 DIAGNOSIS — M86.9 OSTEOMYELITIS OF METATARSAL (HCC): ICD-10-CM

## 2020-12-29 DIAGNOSIS — Z98.890 POST-OPERATIVE STATE: ICD-10-CM

## 2020-12-29 DIAGNOSIS — Z98.890 STATUS POST FOOT SURGERY: Primary | ICD-10-CM

## 2020-12-29 DIAGNOSIS — L97.522 ULCER OF TOE, LEFT, WITH FAT LAYER EXPOSED (HCC): ICD-10-CM

## 2020-12-29 PROCEDURE — 99024 POSTOP FOLLOW-UP VISIT: CPT | Performed by: PODIATRIST

## 2020-12-29 NOTE — PROGRESS NOTES
Amrit Landrum is a 38year old male. Patient presents with:  Post-Op: left foot sx on 12/16/20 - did not check BS this AM - denies any pain.         HPI:   Patient is now 2 weeks status post partial amputation fifth ray doing well incision line well co history.    Social History    Socioeconomic History      Marital status: Single      Spouse name: Not on file      Number of children: Not on file      Years of education: Not on file      Highest education level: Not on file    Tobacco Use      Smoking sta (around 1/5/2021) for To follow-up with Dr. Pablo Oliveros.     Fabiana Escalante DPM  12/29/2020

## 2021-01-13 ENCOUNTER — OFFICE VISIT (OUTPATIENT)
Dept: PODIATRY CLINIC | Facility: CLINIC | Age: 36
End: 2021-01-13
Payer: MEDICARE

## 2021-01-13 DIAGNOSIS — Z98.890 STATUS POST FOOT SURGERY: Primary | ICD-10-CM

## 2021-01-13 PROCEDURE — 99024 POSTOP FOLLOW-UP VISIT: CPT | Performed by: PODIATRIST

## 2021-01-13 NOTE — PROGRESS NOTES
Debbi Kim is a 38year old male. Patient presents with:  Post-Op: Left foot sx on 12/16/2020. Did not check sugar this AM. Denies any pain. HPI:   Patient returns to the clinic now 4 weeks status post surgery overall doing well.   Dressing w Performed by Ginger Salcido MD at [address] [facility] CVOR   • A.V. FISTULA Left 3/12/2020    Performed by Ginger Salcido MD at [address] METATARSAL+TOE,SINGLE     • AV FISTULA REVISION, OPEN     • CATARACT     • OTHER      amputation of R great toe   • TOE cleansing.         Plan: Aseptic dressing reapplied patient will cleanse it once daily apply antibiotic ointment namely mupirocin and a gauze dressing or large Band-Aid follow-up in 2 weeks he can return to light duty activities for 2 weeks beginning this M

## 2021-01-27 ENCOUNTER — OFFICE VISIT (OUTPATIENT)
Dept: PODIATRY CLINIC | Facility: CLINIC | Age: 36
End: 2021-01-27
Payer: MEDICARE

## 2021-01-27 DIAGNOSIS — S91.301S OPEN WOUND OF RIGHT FOOT, SEQUELA: ICD-10-CM

## 2021-01-27 DIAGNOSIS — L97.522 ULCER OF TOE, LEFT, WITH FAT LAYER EXPOSED (HCC): ICD-10-CM

## 2021-01-27 DIAGNOSIS — Z98.890 STATUS POST FOOT SURGERY: Primary | ICD-10-CM

## 2021-01-27 DIAGNOSIS — Z89.411 HISTORY OF PARTIAL RAY AMPUTATION OF FIRST TOE OF RIGHT FOOT (HCC): ICD-10-CM

## 2021-01-27 PROCEDURE — 99024 POSTOP FOLLOW-UP VISIT: CPT | Performed by: PODIATRIST

## 2021-01-27 NOTE — PROGRESS NOTES
Ricardo Naik is a 38year old male.  Patient presents with:  Post-Op: s/p L foot f/u - had sx on 12/16/2020 - states he is ok - no pain and no other c/o - this AM he did not checked his BS         HPI:   Patient returns to clinic for standard postoper Performed by Sandi Man MD at [facility] CV[facility]   • A.V. FISTULA Left 3/12/2020    Performed by Sandi Man MD at [address] METATARSAL+TOE,SINGLE     • AV FISTULA REVISION, OPEN     • CATARACT     • OTHER      amputation of R great toe   • TOE she is debrided a little bit of hyperkeratosis redressed it with antibiotic ointment and gauze feet fastened with tape he can go back to work full duty I will see him in follow-up in 2 weeks but I cautioned him on signs of worsening infection to present to

## 2021-02-01 ENCOUNTER — LAB ENCOUNTER (OUTPATIENT)
Dept: LAB | Facility: HOSPITAL | Age: 36
End: 2021-02-01
Attending: SURGERY
Payer: MEDICARE

## 2021-02-01 ENCOUNTER — EKG ENCOUNTER (OUTPATIENT)
Dept: LAB | Facility: HOSPITAL | Age: 36
End: 2021-02-01
Attending: SURGERY
Payer: MEDICARE

## 2021-02-01 DIAGNOSIS — N18.6 ESRD (END STAGE RENAL DISEASE) (HCC): ICD-10-CM

## 2021-02-01 DIAGNOSIS — Z01.818 PREOP TESTING: ICD-10-CM

## 2021-02-01 LAB
ALBUMIN SERPL-MCNC: 3.3 G/DL (ref 3.4–5)
ALBUMIN/GLOB SERPL: 0.8 {RATIO} (ref 1–2)
ALP LIVER SERPL-CCNC: 81 U/L
ALT SERPL-CCNC: 25 U/L
ANION GAP SERPL CALC-SCNC: 5 MMOL/L (ref 0–18)
APTT PPP: 29.6 SECONDS (ref 25.4–36.1)
AST SERPL-CCNC: 17 U/L (ref 15–37)
ATRIAL RATE: 76 BPM
BASOPHILS # BLD AUTO: 0.05 X10(3) UL (ref 0–0.2)
BASOPHILS NFR BLD AUTO: 0.9 %
BILIRUB SERPL-MCNC: 0.6 MG/DL (ref 0.1–2)
BUN BLD-MCNC: 39 MG/DL (ref 7–18)
BUN/CREAT SERPL: 5.5 (ref 10–20)
CALCIUM BLD-MCNC: 8.9 MG/DL (ref 8.5–10.1)
CHLORIDE SERPL-SCNC: 97 MMOL/L (ref 98–112)
CO2 SERPL-SCNC: 35 MMOL/L (ref 21–32)
CREAT BLD-MCNC: 7.04 MG/DL
DEPRECATED RDW RBC AUTO: 57.1 FL (ref 35.1–46.3)
EOSINOPHIL # BLD AUTO: 0.07 X10(3) UL (ref 0–0.7)
EOSINOPHIL NFR BLD AUTO: 1.3 %
ERYTHROCYTE [DISTWIDTH] IN BLOOD BY AUTOMATED COUNT: 17.3 % (ref 11–15)
GLOBULIN PLAS-MCNC: 4.3 G/DL (ref 2.8–4.4)
GLUCOSE BLD-MCNC: 222 MG/DL (ref 70–99)
HCT VFR BLD AUTO: 39.7 %
HGB BLD-MCNC: 12.3 G/DL
IMM GRANULOCYTES # BLD AUTO: 0.01 X10(3) UL (ref 0–1)
IMM GRANULOCYTES NFR BLD: 0.2 %
INR BLD: 0.99 (ref 0.89–1.11)
LYMPHOCYTES # BLD AUTO: 1.34 X10(3) UL (ref 1–4)
LYMPHOCYTES NFR BLD AUTO: 25.4 %
M PROTEIN MFR SERPL ELPH: 7.6 G/DL (ref 6.4–8.2)
MCH RBC QN AUTO: 28.1 PG (ref 26–34)
MCHC RBC AUTO-ENTMCNC: 31 G/DL (ref 31–37)
MCV RBC AUTO: 90.8 FL
MONOCYTES # BLD AUTO: 0.48 X10(3) UL (ref 0.1–1)
MONOCYTES NFR BLD AUTO: 9.1 %
NEUTROPHILS # BLD AUTO: 3.33 X10 (3) UL (ref 1.5–7.7)
NEUTROPHILS # BLD AUTO: 3.33 X10(3) UL (ref 1.5–7.7)
NEUTROPHILS NFR BLD AUTO: 63.1 %
OSMOLALITY SERPL CALC.SUM OF ELEC: 300 MOSM/KG (ref 275–295)
P-R INTERVAL: 148 MS
PATIENT FASTING Y/N/NP: YES
PLATELET # BLD AUTO: 241 10(3)UL (ref 150–450)
POTASSIUM SERPL-SCNC: 3.5 MMOL/L (ref 3.5–5.1)
PSA SERPL DL<=0.01 NG/ML-MCNC: 13.4 SECONDS (ref 12.4–14.6)
Q-T INTERVAL: 450 MS
QRS DURATION: 82 MS
QTC CALCULATION (BEZET): 506 MS
R AXIS: 120 DEGREES
RBC # BLD AUTO: 4.37 X10(6)UL
SODIUM SERPL-SCNC: 137 MMOL/L (ref 136–145)
T AXIS: 110 DEGREES
VENTRICULAR RATE: 76 BPM
WBC # BLD AUTO: 5.3 X10(3) UL (ref 4–11)

## 2021-02-01 PROCEDURE — 36415 COLL VENOUS BLD VENIPUNCTURE: CPT

## 2021-02-01 PROCEDURE — 85025 COMPLETE CBC W/AUTO DIFF WBC: CPT

## 2021-02-01 PROCEDURE — 93010 ELECTROCARDIOGRAM REPORT: CPT | Performed by: INTERNAL MEDICINE

## 2021-02-01 PROCEDURE — 93005 ELECTROCARDIOGRAM TRACING: CPT

## 2021-02-01 PROCEDURE — 85730 THROMBOPLASTIN TIME PARTIAL: CPT

## 2021-02-01 PROCEDURE — 80053 COMPREHEN METABOLIC PANEL: CPT

## 2021-02-01 PROCEDURE — 85610 PROTHROMBIN TIME: CPT

## 2021-02-02 LAB — SARS-COV-2 RNA RESP QL NAA+PROBE: NOT DETECTED

## 2021-02-08 ENCOUNTER — LAB ENCOUNTER (OUTPATIENT)
Dept: LAB | Facility: HOSPITAL | Age: 36
End: 2021-02-08
Attending: SURGERY
Payer: MEDICARE

## 2021-02-08 DIAGNOSIS — Z01.818 PREOP TESTING: ICD-10-CM

## 2021-02-08 DIAGNOSIS — N18.6 ESRD (END STAGE RENAL DISEASE) (HCC): ICD-10-CM

## 2021-02-09 LAB — SARS-COV-2 RNA RESP QL NAA+PROBE: NOT DETECTED

## 2021-02-10 ENCOUNTER — OFFICE VISIT (OUTPATIENT)
Dept: PODIATRY CLINIC | Facility: CLINIC | Age: 36
End: 2021-02-10
Payer: MEDICARE

## 2021-02-10 ENCOUNTER — ANESTHESIA EVENT (OUTPATIENT)
Dept: CARDIAC SURGERY | Facility: HOSPITAL | Age: 36
End: 2021-02-10
Payer: MEDICARE

## 2021-02-10 DIAGNOSIS — Z98.890 STATUS POST FOOT SURGERY: Primary | ICD-10-CM

## 2021-02-10 PROCEDURE — 99024 POSTOP FOLLOW-UP VISIT: CPT | Performed by: PODIATRIST

## 2021-02-10 RX ORDER — LISINOPRIL 40 MG/1
40 TABLET ORAL DAILY
COMMUNITY
Start: 2021-02-05

## 2021-02-10 NOTE — PROGRESS NOTES
Adrienne Mariano is a 38year old male. Patient presents with:  Post-Op: s/p left foot -- Sx on 12/16/20. Rates pain 0/10. Denies any fever. HPI:   Patient returns to clinic for standard postoperative checkup relates no drainage.   At today's visi Jenifer Shankar MD at [facility] CVOR   • A.V. FISTULA Left 3/12/2020    Performed by Jenifer Shankar MD at [facility] CVOR   • AMPUTATION METATARSAL+TOE,SINGLE     • AV FISTULA REVISION, OPEN     • CATARACT     • OTHER      amputation of R great toe   • TOE AMPUTATION Ri Bk Kerr DPM

## 2021-02-10 NOTE — H&P
[facility]    PATIENT'S NAME: [facility]   ATTENDING PHYSICIAN: Alyce Castañeda M.D.    PATIENT ACCOUNT#:   [de-identified]    LOCATION:    MEDICAL RECORD #:   [id number]       YOB: 1985  ADMISSION DATE:       02/11/2021    HISTORY AN Pupils equal, round. Sclerae clear. Mouth without lesions. NECK:  No cervical bruit. No JVD. EXTREMITIES:  Normal Gautam test of both upper extremities. Femoral pulses palpable. Popliteal and pedal pulses palpable.   No gross veins seen on either ex

## 2021-02-11 ENCOUNTER — ANESTHESIA (OUTPATIENT)
Dept: CARDIAC SURGERY | Facility: HOSPITAL | Age: 36
End: 2021-02-11
Payer: MEDICARE

## 2021-02-11 ENCOUNTER — HOSPITAL ENCOUNTER (OUTPATIENT)
Facility: HOSPITAL | Age: 36
LOS: 1 days | Discharge: HOME | End: 2021-02-12
Attending: SURGERY | Admitting: SURGERY
Payer: MEDICARE

## 2021-02-11 DIAGNOSIS — Z01.818 PREOP TESTING: ICD-10-CM

## 2021-02-11 DIAGNOSIS — N18.6 ESRD (END STAGE RENAL DISEASE) (HCC): Primary | ICD-10-CM

## 2021-02-11 LAB
ANION GAP SERPL CALC-SCNC: 10 MMOL/L (ref 0–18)
BUN BLD-MCNC: 53 MG/DL (ref 7–18)
BUN/CREAT SERPL: 6 (ref 10–20)
CALCIUM BLD-MCNC: 8.7 MG/DL (ref 8.5–10.1)
CHLORIDE SERPL-SCNC: 100 MMOL/L (ref 98–112)
CO2 SERPL-SCNC: 25 MMOL/L (ref 21–32)
CREAT BLD-MCNC: 8.78 MG/DL
GLUCOSE BLD-MCNC: 116 MG/DL (ref 70–99)
GLUCOSE BLD-MCNC: 121 MG/DL (ref 70–99)
GLUCOSE BLD-MCNC: 124 MG/DL (ref 70–99)
GLUCOSE BLD-MCNC: 148 MG/DL (ref 70–99)
GLUCOSE BLD-MCNC: 206 MG/DL (ref 70–99)
OSMOLALITY SERPL CALC.SUM OF ELEC: 295 MOSM/KG (ref 275–295)
POTASSIUM SERPL-SCNC: 4 MMOL/L (ref 3.5–5.1)
SODIUM SERPL-SCNC: 135 MMOL/L (ref 136–145)

## 2021-02-11 PROCEDURE — 03170ZF BYPASS RIGHT BRACHIAL ARTERY TO LOWER ARM VEIN, OPEN APPROACH: ICD-10-PCS | Performed by: SURGERY

## 2021-02-11 PROCEDURE — 99214 OFFICE O/P EST MOD 30 MIN: CPT | Performed by: HOSPITALIST

## 2021-02-11 PROCEDURE — 99214 OFFICE O/P EST MOD 30 MIN: CPT | Performed by: INTERNAL MEDICINE

## 2021-02-11 RX ORDER — LIDOCAINE HYDROCHLORIDE 10 MG/ML
INJECTION, SOLUTION EPIDURAL; INFILTRATION; INTRACAUDAL; PERINEURAL AS NEEDED
Status: DISCONTINUED | OUTPATIENT
Start: 2021-02-11 | End: 2021-02-11 | Stop reason: SURG

## 2021-02-11 RX ORDER — AMLODIPINE BESYLATE 10 MG/1
10 TABLET ORAL DAILY
Status: DISCONTINUED | OUTPATIENT
Start: 2021-02-11 | End: 2021-02-12

## 2021-02-11 RX ORDER — DEXTROSE MONOHYDRATE 25 G/50ML
50 INJECTION, SOLUTION INTRAVENOUS
Status: DISCONTINUED | OUTPATIENT
Start: 2021-02-11 | End: 2021-02-11 | Stop reason: HOSPADM

## 2021-02-11 RX ORDER — INSULIN ASPART 100 [IU]/ML
INJECTION, SOLUTION INTRAVENOUS; SUBCUTANEOUS ONCE
Status: DISCONTINUED | OUTPATIENT
Start: 2021-02-11 | End: 2021-02-11 | Stop reason: HOSPADM

## 2021-02-11 RX ORDER — HEPARIN SODIUM 1000 [USP'U]/ML
1.5 INJECTION, SOLUTION INTRAVENOUS; SUBCUTANEOUS ONCE
Status: COMPLETED | OUTPATIENT
Start: 2021-02-11 | End: 2021-02-12

## 2021-02-11 RX ORDER — DEXTROSE MONOHYDRATE 25 G/50ML
50 INJECTION, SOLUTION INTRAVENOUS
Status: DISCONTINUED | OUTPATIENT
Start: 2021-02-11 | End: 2021-02-12

## 2021-02-11 RX ORDER — ACETAMINOPHEN 325 MG/1
650 TABLET ORAL EVERY 4 HOURS PRN
Status: DISCONTINUED | OUTPATIENT
Start: 2021-02-11 | End: 2021-02-12

## 2021-02-11 RX ORDER — MORPHINE SULFATE 2 MG/ML
2 INJECTION, SOLUTION INTRAMUSCULAR; INTRAVENOUS EVERY 2 HOUR PRN
Status: DISCONTINUED | OUTPATIENT
Start: 2021-02-11 | End: 2021-02-12

## 2021-02-11 RX ORDER — MEPERIDINE HYDROCHLORIDE 25 MG/ML
12.5 INJECTION INTRAMUSCULAR; INTRAVENOUS; SUBCUTANEOUS AS NEEDED
Status: DISCONTINUED | OUTPATIENT
Start: 2021-02-11 | End: 2021-02-11 | Stop reason: HOSPADM

## 2021-02-11 RX ORDER — CEFAZOLIN SODIUM/WATER 2 G/20 ML
2 SYRINGE (ML) INTRAVENOUS EVERY 12 HOURS
Status: COMPLETED | OUTPATIENT
Start: 2021-02-11 | End: 2021-02-12

## 2021-02-11 RX ORDER — ERGOCALCIFEROL 1.25 MG/1
50000 CAPSULE ORAL
Status: DISCONTINUED | OUTPATIENT
Start: 2021-03-12 | End: 2021-02-12

## 2021-02-11 RX ORDER — HEPARIN SODIUM 5000 [USP'U]/ML
5000 INJECTION, SOLUTION INTRAVENOUS; SUBCUTANEOUS ONCE
Status: DISCONTINUED | OUTPATIENT
Start: 2021-02-11 | End: 2021-02-11 | Stop reason: HOSPADM

## 2021-02-11 RX ORDER — ONDANSETRON 2 MG/ML
INJECTION INTRAMUSCULAR; INTRAVENOUS AS NEEDED
Status: DISCONTINUED | OUTPATIENT
Start: 2021-02-11 | End: 2021-02-11 | Stop reason: SURG

## 2021-02-11 RX ORDER — CINACALCET 30 MG/1
30 TABLET, FILM COATED ORAL
Status: DISCONTINUED | OUTPATIENT
Start: 2021-02-11 | End: 2021-02-12

## 2021-02-11 RX ORDER — METOCLOPRAMIDE HYDROCHLORIDE 5 MG/ML
10 INJECTION INTRAMUSCULAR; INTRAVENOUS AS NEEDED
Status: DISCONTINUED | OUTPATIENT
Start: 2021-02-11 | End: 2021-02-11 | Stop reason: HOSPADM

## 2021-02-11 RX ORDER — HYDROCODONE BITARTRATE AND ACETAMINOPHEN 5; 325 MG/1; MG/1
1 TABLET ORAL EVERY 4 HOURS PRN
Status: DISCONTINUED | OUTPATIENT
Start: 2021-02-11 | End: 2021-02-12

## 2021-02-11 RX ORDER — ATORVASTATIN CALCIUM 40 MG/1
40 TABLET, FILM COATED ORAL NIGHTLY
Status: DISCONTINUED | OUTPATIENT
Start: 2021-02-11 | End: 2021-02-12

## 2021-02-11 RX ORDER — ONDANSETRON 2 MG/ML
4 INJECTION INTRAMUSCULAR; INTRAVENOUS EVERY 6 HOURS PRN
Status: DISCONTINUED | OUTPATIENT
Start: 2021-02-11 | End: 2021-02-12

## 2021-02-11 RX ORDER — METOPROLOL SUCCINATE 25 MG/1
25 TABLET, EXTENDED RELEASE ORAL DAILY
Status: DISCONTINUED | OUTPATIENT
Start: 2021-02-11 | End: 2021-02-12

## 2021-02-11 RX ORDER — PHENYLEPHRINE HCL 10 MG/ML
VIAL (ML) INJECTION AS NEEDED
Status: DISCONTINUED | OUTPATIENT
Start: 2021-02-11 | End: 2021-02-11 | Stop reason: SURG

## 2021-02-11 RX ORDER — MORPHINE SULFATE 2 MG/ML
1 INJECTION, SOLUTION INTRAMUSCULAR; INTRAVENOUS EVERY 2 HOUR PRN
Status: DISCONTINUED | OUTPATIENT
Start: 2021-02-11 | End: 2021-02-12

## 2021-02-11 RX ORDER — HYDROMORPHONE HYDROCHLORIDE 1 MG/ML
0.4 INJECTION, SOLUTION INTRAMUSCULAR; INTRAVENOUS; SUBCUTANEOUS EVERY 5 MIN PRN
Status: DISCONTINUED | OUTPATIENT
Start: 2021-02-11 | End: 2021-02-11 | Stop reason: HOSPADM

## 2021-02-11 RX ORDER — BISACODYL 10 MG
10 SUPPOSITORY, RECTAL RECTAL
Status: DISCONTINUED | OUTPATIENT
Start: 2021-02-11 | End: 2021-02-12

## 2021-02-11 RX ORDER — HEPARIN SODIUM 1000 [USP'U]/ML
INJECTION, SOLUTION INTRAVENOUS; SUBCUTANEOUS AS NEEDED
Status: DISCONTINUED | OUTPATIENT
Start: 2021-02-11 | End: 2021-02-11 | Stop reason: SURG

## 2021-02-11 RX ORDER — NALOXONE HYDROCHLORIDE 0.4 MG/ML
80 INJECTION, SOLUTION INTRAMUSCULAR; INTRAVENOUS; SUBCUTANEOUS AS NEEDED
Status: DISCONTINUED | OUTPATIENT
Start: 2021-02-11 | End: 2021-02-11 | Stop reason: HOSPADM

## 2021-02-11 RX ORDER — PROTAMINE SULFATE 10 MG/ML
INJECTION, SOLUTION INTRAVENOUS AS NEEDED
Status: DISCONTINUED | OUTPATIENT
Start: 2021-02-11 | End: 2021-02-11 | Stop reason: SURG

## 2021-02-11 RX ORDER — SODIUM CHLORIDE 9 MG/ML
INJECTION, SOLUTION INTRAVENOUS CONTINUOUS
Status: DISCONTINUED | OUTPATIENT
Start: 2021-02-11 | End: 2021-02-11 | Stop reason: HOSPADM

## 2021-02-11 RX ORDER — HYDROCODONE BITARTRATE AND ACETAMINOPHEN 5; 325 MG/1; MG/1
2 TABLET ORAL EVERY 4 HOURS PRN
Status: DISCONTINUED | OUTPATIENT
Start: 2021-02-11 | End: 2021-02-12

## 2021-02-11 RX ORDER — DOCUSATE SODIUM 100 MG/1
100 CAPSULE, LIQUID FILLED ORAL 2 TIMES DAILY
Status: DISCONTINUED | OUTPATIENT
Start: 2021-02-11 | End: 2021-02-12

## 2021-02-11 RX ORDER — CALCIUM ACETATE 667 MG/1
2 CAPSULE ORAL 3 TIMES DAILY
Status: DISCONTINUED | OUTPATIENT
Start: 2021-02-11 | End: 2021-02-12

## 2021-02-11 RX ORDER — POLYETHYLENE GLYCOL 3350 17 G/17G
17 POWDER, FOR SOLUTION ORAL DAILY PRN
Status: DISCONTINUED | OUTPATIENT
Start: 2021-02-11 | End: 2021-02-12

## 2021-02-11 RX ORDER — CEFAZOLIN SODIUM/WATER 2 G/20 ML
SYRINGE (ML) INTRAVENOUS AS NEEDED
Status: DISCONTINUED | OUTPATIENT
Start: 2021-02-11 | End: 2021-02-11 | Stop reason: SURG

## 2021-02-11 RX ORDER — EPHEDRINE SULFATE 50 MG/ML
INJECTION INTRAVENOUS AS NEEDED
Status: DISCONTINUED | OUTPATIENT
Start: 2021-02-11 | End: 2021-02-11 | Stop reason: SURG

## 2021-02-11 RX ORDER — PANTOPRAZOLE SODIUM 40 MG/1
40 TABLET, DELAYED RELEASE ORAL EVERY 24 HOURS
Status: DISCONTINUED | OUTPATIENT
Start: 2021-02-11 | End: 2021-02-12

## 2021-02-11 RX ORDER — SODIUM CHLORIDE 9 MG/ML
INJECTION, SOLUTION INTRAVENOUS CONTINUOUS PRN
Status: DISCONTINUED | OUTPATIENT
Start: 2021-02-11 | End: 2021-02-11 | Stop reason: SURG

## 2021-02-11 RX ORDER — SODIUM CHLORIDE 9 MG/ML
INJECTION, SOLUTION INTRAVENOUS CONTINUOUS
Status: DISCONTINUED | OUTPATIENT
Start: 2021-02-11 | End: 2021-02-12

## 2021-02-11 RX ORDER — ONDANSETRON 2 MG/ML
4 INJECTION INTRAMUSCULAR; INTRAVENOUS AS NEEDED
Status: DISCONTINUED | OUTPATIENT
Start: 2021-02-11 | End: 2021-02-11 | Stop reason: HOSPADM

## 2021-02-11 RX ORDER — BUPIVACAINE HYDROCHLORIDE 5 MG/ML
INJECTION, SOLUTION EPIDURAL; INTRACAUDAL AS NEEDED
Status: DISCONTINUED | OUTPATIENT
Start: 2021-02-11 | End: 2021-02-11 | Stop reason: HOSPADM

## 2021-02-11 RX ORDER — DIPHENHYDRAMINE HYDROCHLORIDE 50 MG/ML
12.5 INJECTION INTRAMUSCULAR; INTRAVENOUS AS NEEDED
Status: DISCONTINUED | OUTPATIENT
Start: 2021-02-11 | End: 2021-02-11 | Stop reason: HOSPADM

## 2021-02-11 RX ADMIN — SODIUM CHLORIDE: 9 INJECTION, SOLUTION INTRAVENOUS at 08:35:00

## 2021-02-11 RX ADMIN — PHENYLEPHRINE HCL 100 MCG: 10 MG/ML VIAL (ML) INJECTION at 10:11:00

## 2021-02-11 RX ADMIN — CEFAZOLIN SODIUM/WATER 2 G: 2 G/20 ML SYRINGE (ML) INTRAVENOUS at 08:47:00

## 2021-02-11 RX ADMIN — SODIUM CHLORIDE: 9 INJECTION, SOLUTION INTRAVENOUS at 11:12:00

## 2021-02-11 RX ADMIN — PHENYLEPHRINE HCL 100 MCG: 10 MG/ML VIAL (ML) INJECTION at 09:56:00

## 2021-02-11 RX ADMIN — PHENYLEPHRINE HCL 100 MCG: 10 MG/ML VIAL (ML) INJECTION at 09:53:00

## 2021-02-11 RX ADMIN — PROTAMINE SULFATE 10 MG: 10 INJECTION, SOLUTION INTRAVENOUS at 11:07:00

## 2021-02-11 RX ADMIN — SODIUM CHLORIDE: 9 INJECTION, SOLUTION INTRAVENOUS at 09:59:00

## 2021-02-11 RX ADMIN — LIDOCAINE HYDROCHLORIDE 50 MG: 10 INJECTION, SOLUTION EPIDURAL; INFILTRATION; INTRACAUDAL; PERINEURAL at 08:42:00

## 2021-02-11 RX ADMIN — EPHEDRINE SULFATE 10 MG: 50 INJECTION INTRAVENOUS at 09:18:00

## 2021-02-11 RX ADMIN — ONDANSETRON 4 MG: 2 INJECTION INTRAMUSCULAR; INTRAVENOUS at 10:49:00

## 2021-02-11 RX ADMIN — EPHEDRINE SULFATE 10 MG: 50 INJECTION INTRAVENOUS at 09:48:00

## 2021-02-11 RX ADMIN — HEPARIN SODIUM 6000 UNITS: 1000 INJECTION, SOLUTION INTRAVENOUS; SUBCUTANEOUS at 09:10:00

## 2021-02-11 RX ADMIN — EPHEDRINE SULFATE 10 MG: 50 INJECTION INTRAVENOUS at 09:43:00

## 2021-02-11 RX ADMIN — EPHEDRINE SULFATE 10 MG: 50 INJECTION INTRAVENOUS at 08:59:00

## 2021-02-11 RX ADMIN — HEPARIN SODIUM 6000 UNITS: 1000 INJECTION, SOLUTION INTRAVENOUS; SUBCUTANEOUS at 09:52:00

## 2021-02-11 RX ADMIN — EPHEDRINE SULFATE 10 MG: 50 INJECTION INTRAVENOUS at 09:03:00

## 2021-02-11 RX ADMIN — PROTAMINE SULFATE 30 MG: 10 INJECTION, SOLUTION INTRAVENOUS at 10:49:00

## 2021-02-11 RX ADMIN — SODIUM CHLORIDE: 9 INJECTION, SOLUTION INTRAVENOUS at 08:53:00

## 2021-02-11 RX ADMIN — PHENYLEPHRINE HCL 100 MCG: 10 MG/ML VIAL (ML) INJECTION at 10:13:00

## 2021-02-11 RX ADMIN — PHENYLEPHRINE HCL 100 MCG: 10 MG/ML VIAL (ML) INJECTION at 10:36:00

## 2021-02-11 NOTE — ANESTHESIA PREPROCEDURE EVALUATION
PRE-OP EVALUATION    Patient Name: Lisa Cava    Pre-op Diagnosis: end stage renal disease, with occluded left arm graft/auxillary vein    Procedure(s):  creation of right arm prosthetic arteriovenous fistula graft    Surgeon(s) and Role:     * [facility] Rfl: , 2/9/2021    •  Glucose Blood (ONETOUCH ULTRA BLUE) In Vitro Strip, 1 each by Other route daily. Test once daily, Disp: 100 each, Rfl: prn        Allergies: Patient has no known allergies. Anesthesia Evaluation    Patient summary reviewed.     An drinks      Drug use: No     Available pre-op labs reviewed.   Lab Results   Component Value Date    WBC 5.3 02/01/2021    RBC 4.37 02/01/2021    HGB 12.3 (L) 02/01/2021    HCT 39.7 02/01/2021    MCV 90.8 02/01/2021    MCH 28.1 02/01/2021    MCHC 31.0 02/01

## 2021-02-11 NOTE — CONSULTS
[facility]  Enrico Zhong Patient Status:  Observation    1985 MRN [id number]   [facility] 7NE-A Attending Kym Sherman MD   Hosp Day # 1 PCP Lajuan Dancer     Reason for consult: Diabetes mellitus     Request 21 DAYS, Disp: , Rfl:     •  amLODIPine Besylate 10 MG Oral Tab, Take 1 tablet (10 mg total) by mouth daily. , Disp: 90 tablet, Rfl: 3    •  Metoprolol Succinate ER 25 MG Oral Tablet 24 Hr, Take 1 tablet (25 mg total) by mouth daily. , Disp: 30 tablet, Rfl: RIGHT AVF 2/11  1. Per Vascular   2. HD per nephrology   2. Essential hypertension  1. Norvasc  2. Toprol  3. Lisinopril  4. Monitor hemodynamics  5. Telemetry  3. Dyslipidemia  1. Lipitor   4. Diabetes mellitus  1. Levemir 6/night  2.  Correctional scale

## 2021-02-11 NOTE — OPERATIVE REPORT
Pre-op DX: ESRD. Post-op Dx: Same. Procedure: Right brachial- basilic upper arm fistulas. Surgeon: Lance Kimble. Asst: Elio Howell.  EBL: 200cc

## 2021-02-11 NOTE — CONSULTS
[facility]  Report of Consultation    Lattie Gist Patient Status:  Observation    1985 MRN [id number]   Location [facility] Attending Dominick Malave MD   Hosp Day # 1 PCP Dalton Leiva     Reason for Consultat Intravenous, PRN  •  fentaNYL citrate (SUBLIMAZE) 0.05 MG/ML injection 25 mcg, 25 mcg, Intravenous, Q5 Min PRN  •  HYDROmorphone HCl (DILAUDID) 1 MG/ML injection 0.4 mg, 0.4 mg, Intravenous, Q5 Min PRN  •  0.9% NaCl infusion, , Intravenous, Continuous  • per 24 hour   Intake 310 ml   Output —   Net 310 ml     Last 3 Weights  01/29/21 1056 : 195 lb (88.5 kg)  12/01/20 0748 : 195 lb (88.5 kg)  11/17/20 0504 : 190 lb 11.2 oz (86.5 kg)  11/16/20 0500 : 193 lb 11.2 oz (87.9 kg)  11/15/20 0130 : 198 lb 9.6 oz (9 Impression/Plan:    #1. ESRD- due to DM. HD to cont MWF per usual routine    #2. HTN- cont amlodipine, lisinopril, metoprolol    #3. S/p AVF- per Dr. Tiffanie Galvan    Thank you for allowing me to participate in the care of your patient.  Please do

## 2021-02-11 NOTE — ANESTHESIA POSTPROCEDURE EVALUATION
[address] Patient Status:  Observation   Age/Gender 38year old male MRN [id number]   Location [address] [facility] Attending Li Shaffer MD   Hosp Day # 1 PCP Cheryl Rooney       Anesthesia Post-op Note

## 2021-02-11 NOTE — ANESTHESIA PROCEDURE NOTES
Airway  Date/Time: 2/11/2021 8:45 AM  Urgency: elective    Airway not difficult    General Information and Staff    Patient location during procedure: OR  Anesthesiologist: Arlene Turcios MD  Performed: anesthesiologist     Indications and Patient Conditio

## 2021-02-12 VITALS
WEIGHT: 191.81 LBS | RESPIRATION RATE: 12 BRPM | HEART RATE: 83 BPM | BODY MASS INDEX: 25.98 KG/M2 | HEIGHT: 72 IN | OXYGEN SATURATION: 95 % | SYSTOLIC BLOOD PRESSURE: 153 MMHG | TEMPERATURE: 98 F | DIASTOLIC BLOOD PRESSURE: 73 MMHG

## 2021-02-12 LAB
ANION GAP SERPL CALC-SCNC: 11 MMOL/L (ref 0–18)
BASOPHILS # BLD AUTO: 0.03 X10(3) UL (ref 0–0.2)
BASOPHILS NFR BLD AUTO: 0.3 %
BUN BLD-MCNC: 66 MG/DL (ref 7–18)
BUN/CREAT SERPL: 5.8 (ref 10–20)
CALCIUM BLD-MCNC: 8.9 MG/DL (ref 8.5–10.1)
CHLORIDE SERPL-SCNC: 99 MMOL/L (ref 98–112)
CO2 SERPL-SCNC: 25 MMOL/L (ref 21–32)
CREAT BLD-MCNC: 11.4 MG/DL
DEPRECATED RDW RBC AUTO: 54.4 FL (ref 35.1–46.3)
EOSINOPHIL # BLD AUTO: 0.03 X10(3) UL (ref 0–0.7)
EOSINOPHIL NFR BLD AUTO: 0.3 %
ERYTHROCYTE [DISTWIDTH] IN BLOOD BY AUTOMATED COUNT: 17.2 % (ref 11–15)
EST. AVERAGE GLUCOSE BLD GHB EST-MCNC: 166 MG/DL (ref 68–126)
GLUCOSE BLD-MCNC: 108 MG/DL (ref 70–99)
GLUCOSE BLD-MCNC: 117 MG/DL (ref 70–99)
GLUCOSE BLD-MCNC: 130 MG/DL (ref 70–99)
HBA1C MFR BLD HPLC: 7.4 % (ref ?–5.7)
HCT VFR BLD AUTO: 35.9 %
HGB BLD-MCNC: 11.6 G/DL
IMM GRANULOCYTES # BLD AUTO: 0.03 X10(3) UL (ref 0–1)
IMM GRANULOCYTES NFR BLD: 0.3 %
LYMPHOCYTES # BLD AUTO: 1 X10(3) UL (ref 1–4)
LYMPHOCYTES NFR BLD AUTO: 10.4 %
MCH RBC QN AUTO: 28.2 PG (ref 26–34)
MCHC RBC AUTO-ENTMCNC: 32.3 G/DL (ref 31–37)
MCV RBC AUTO: 87.1 FL
MONOCYTES # BLD AUTO: 0.76 X10(3) UL (ref 0.1–1)
MONOCYTES NFR BLD AUTO: 7.9 %
NEUTROPHILS # BLD AUTO: 7.79 X10 (3) UL (ref 1.5–7.7)
NEUTROPHILS # BLD AUTO: 7.79 X10(3) UL (ref 1.5–7.7)
NEUTROPHILS NFR BLD AUTO: 80.8 %
OSMOLALITY SERPL CALC.SUM OF ELEC: 300 MOSM/KG (ref 275–295)
PLATELET # BLD AUTO: 250 10(3)UL (ref 150–450)
POTASSIUM SERPL-SCNC: 4.2 MMOL/L (ref 3.5–5.1)
RBC # BLD AUTO: 4.12 X10(6)UL
SODIUM SERPL-SCNC: 135 MMOL/L (ref 136–145)
WBC # BLD AUTO: 9.6 X10(3) UL (ref 4–11)

## 2021-02-12 PROCEDURE — 90935 HEMODIALYSIS ONE EVALUATION: CPT | Performed by: INTERNAL MEDICINE

## 2021-02-12 PROCEDURE — 99214 OFFICE O/P EST MOD 30 MIN: CPT | Performed by: HOSPITALIST

## 2021-02-12 NOTE — PROGRESS NOTES
Assumed pt care @ 6612. Pt had HD - 3L removed. Pt cleared to discharge by all MDs. Discharge instruction reviewed with pt, verbalized understanding. Paper rx for Norco given to pt. Dressing to right arm changed prior to discharge.    Pt refused

## 2021-02-12 NOTE — CM/SW NOTE
Order received for \"transportation from hospital home\". Per RN in rounds, pt has his car here at the hospital.  If he is ok to drive home, he will drive his car home; otherwise, transportation can be set up if needed.   Pt has no other dc needs at this t
What Type Of Note Output Would You Prefer (Optional)?: Standard Output
How Severe Is Your Rash?: mild
Is This A New Presentation, Or A Follow-Up?: Rash

## 2021-02-12 NOTE — PLAN OF CARE
Assumed care at [address] [facility]. Denies pain/discomfort at this time. Dressing to R AVF clean, dry, intact. (+) thrill and bruit. IV abx.   Plan: HD 2/12 then possible d/c      Problem: METABOLIC/FLUID AND ELECTROLYTES - ADULT  Goal: Glucose maintained within presc document dressing/incision, wound bed, drain sites and surrounding tissue  - Implement wound care per orders  - Initiate isolation precautions as appropriate  - Initiate Pressure Ulcer prevention bundle as indicated  Outcome: Progressing     Problem: HEMAT pre-medicate as appropriate  Outcome: Progressing     Problem: RISK FOR INFECTION - ADULT  Goal: Absence of fever/infection during anticipated neutropenic period  Description: INTERVENTIONS  - Monitor WBC  - Administer growth factors as ordered  - Babs healing without complication    Interventions:  - F/u with pcp  -Monitor for s/s to incision site  - See additional Care Plan goals for specific interventions  Outcome: Progressing  Goal: Patient/Family Short Term Goal  Description: Patient's Short Term Go

## 2021-02-12 NOTE — PLAN OF CARE
Problem: METABOLIC/FLUID AND ELECTROLYTES - ADULT  Goal: Glucose maintained within prescribed range  Description: INTERVENTIONS:  - Monitor Blood Glucose as ordered  - Assess for signs and symptoms of hyperglycemia and hypoglycemia  - Administer ordered Initiate Pressure Ulcer prevention bundle as indicated  Outcome: Progressing     Problem: HEMATOLOGIC - ADULT  Goal: Maintains hematologic stability  Description: INTERVENTIONS  - Assess for signs and symptoms of bleeding or hemorrhage  - Monitor labs and period  Description: INTERVENTIONS  - Monitor WBC  - Administer growth factors as ordered  - Implement neutropenic guidelines  Outcome: Progressing     Problem: RISK FOR INFECTION - ADULT  Goal: Absence of fever/infection during anticipated neutropenic per Patient/Family Goals  Goal: Patient/Family Long Term Goal  Description: Patient's Long Term Goal: Right AVF healing without complication    Interventions:  - F/u with pcp  -Monitor for s/s to incision site  - See additional Care Plan goals for specific int

## 2021-02-12 NOTE — PROGRESS NOTES
[facility]  Progress Note     Jaden Lynn Patient Status:  Outpatient in a Bed    1985 MRN [id number]   [facility] 7NE-A Attending Juliana Souza MD   Hosp Day # 1 PCP Nuha Lee     Chief Complaint: ESRD    S: Patient d TID   • Cinacalcet HCl  30 mg Oral Daily with breakfast   • apixaban  2.5 mg Oral BID   • Metoprolol Succinate ER  25 mg Oral Daily   • lisinopril  30 mg Oral Daily   • [START ON 3/12/2021] ergocalciferol  50,000 Units Oral Q30 Days   • heparin sodium  1.5

## 2021-02-12 NOTE — OPERATIVE REPORT
[facility]    PATIENT'S NAME: Lesli Crisostomo   ATTENDING PHYSICIAN: Gina Queen M.D. OPERATING PHYSICIAN: Gina Queen M.D.    PATIENT ACCOUNT#:   [de-identified]    LOCATION:  [address] [place] [address]  MEDICAL RECORD #:   [id number]       DATE OF BIRTH: thrombosis, failure to mature, possible revision, central vein stenosis from his permacath with possible assessment. The options of peritoneal dialysis versus continuing with the permacath were also explained. Patient understood and agreed.   All question try to make it as far out as possible. The vein was placed through this area with a Alie-Wick tunneler, after making sure the subcutaneous tunnel was widely open. Vein was brought down to the brachial artery.   It had enough length to bring it down to ju vein.    Dictated By Adelfo Arteaag M.D.  d: 02/11/2021 11:21:42  t: 02/11/2021 17:23:29  Job [id number]  JJW/    cc: DEBI Ham M.D. Walt Fuss, M.D. Quinton Ores, M.D.

## 2021-02-12 NOTE — PROGRESS NOTES
@ 1300 Received pt from PACU, oriented to [address]. Contact isolation initiated for hx MRSA. Pt drowsy, easy to arouse, will wait until pt is more awake. Dressing to right arm clean dry and intact. +2 palpable right radial pulse.  Dr. Tom Minor notified of co

## 2021-02-12 NOTE — PROGRESS NOTES
[facility]  Nephrology Progress Note    Shannen Harm Patient Status:  Outpatient in a Bed    1985 MRN [id number]   [facility] 7NE-A Attending Chacha Lucero MD   Hosp Day # 1 PCP Fabienne Duarte       SUBJECTIVE:  Dialysis note, results for input(s): ALT, AST, ALB, AMYLASE, LIPASE, LDH in the last 168 hours.     Invalid input(s): ALPHOS, TBIL, DBIL, TPROT    Recent Labs   Lab 02/11/21  0748 02/11/21  1142 02/11/21  1706 02/11/21  2149 02/12/21  0743   PGLU 124* 121* 148* 206* 117* Nightly    •  glucose (DEX4) oral liquid 15 g, 15 g, Oral, Q15 Min PRN    Or    •  Glucose-Vitamin C (DEX-4) chewable tab 4 tablet, 4 tablet, Oral, Q15 Min PRN    Or    •  dextrose 50 % injection 50 mL, 50 mL, Intravenous, Q15 Min PRN    Or    •  glucose (

## 2021-02-13 NOTE — DISCHARGE SUMMARY
[facility]    PATIENT'S NAME: Francesco Guerrero   ATTENDING PHYSICIAN: David Knott M.D.    PATIENT ACCOUNT#:   [de-identified]    LOCATION:  [address] [place] [address]  MEDICAL RECORD #:   [id number]       YOB: 1985  ADMISSION DATE:       02/11/2

## 2021-02-25 ENCOUNTER — OFFICE VISIT (OUTPATIENT)
Dept: PODIATRY CLINIC | Facility: CLINIC | Age: 36
End: 2021-02-25
Payer: MEDICARE

## 2021-02-25 DIAGNOSIS — Z98.890 STATUS POST FOOT SURGERY: Primary | ICD-10-CM

## 2021-02-25 DIAGNOSIS — Z98.890 POST-OPERATIVE STATE: ICD-10-CM

## 2021-02-25 DIAGNOSIS — M21.6X2 PLANTAR FLEXED METATARSAL BONE OF LEFT FOOT: ICD-10-CM

## 2021-02-25 PROCEDURE — 99024 POSTOP FOLLOW-UP VISIT: CPT | Performed by: PODIATRIST

## 2021-02-25 NOTE — PROGRESS NOTES
Chin Rashid is a 38year old male. Patient presents with:  Post-Op: BS this AM - right foot sx on 12/16/20 - denies any pain. HPI:   Patient here for follow-up regarding surgery from 12/16 on his left foot.       Allergies: Patient has no know Donovan Rainey MD at [address] METATARSAL+TOE,SINGLE     • AV FISTULA REVISION, OPEN     • CATARACT     • OTHER      amputation of R great toe   • TOE AMPUTATION Right 7/3/2020    Performed by Dai Linton DPM at [address]      History revie orders for this visit:    Status post foot surgery    History of partial ray amputation of first toe of right foot (HCC)    Plantar flexed metatarsal bone of left foot        Plan: I do feel that he needs to follow-up with Dr. Libia Meza in a couple of weeks

## 2021-03-10 ENCOUNTER — OFFICE VISIT (OUTPATIENT)
Dept: PODIATRY CLINIC | Facility: CLINIC | Age: 36
End: 2021-03-10
Payer: MEDICARE

## 2021-03-10 DIAGNOSIS — Z98.890 STATUS POST FOOT SURGERY: Primary | ICD-10-CM

## 2021-03-10 DIAGNOSIS — E11.42 DIABETIC PERIPHERAL NEUROPATHY (HCC): ICD-10-CM

## 2021-03-10 DIAGNOSIS — L97.522 ULCER OF TOE, LEFT, WITH FAT LAYER EXPOSED (HCC): ICD-10-CM

## 2021-03-10 PROCEDURE — 99024 POSTOP FOLLOW-UP VISIT: CPT | Performed by: PODIATRIST

## 2021-03-10 NOTE — PROGRESS NOTES
Cheri Santo is a 38year old male.  Patient presents with:  Post-Op: left foot ck - denies any pain - did not check BS this AM.        HPI:   Patient returns to clinic status post foot surgery for checkup he notices no drainage from the incision site Performed by Eulogio Little MD at [address] [facility]   • A.V. FISTULA Left 10/8/2020    Performed by Eulogio Little MD at [facility]   • A.V. FISTULA Left 3/12/2020    Performed by Eulogio Little MD at [address] METATARSAL+TOE,SINGLE     • AV FISTULA REV below  RESPIRATORY: denies shortness of breath with exertion  CARDIOVASCULAR: denies chest pain on exertion  GI: denies abdominal pain and denies heartburn  NEURO: denies headaches    EXAM:   There were no vitals taken for this visit.   GENERAL: well develo

## 2021-03-22 ENCOUNTER — PATIENT MESSAGE (OUTPATIENT)
Dept: ADMINISTRATIVE | Facility: HOSPITAL | Age: 36
End: 2021-03-22

## 2021-03-23 ENCOUNTER — HOSPITAL ENCOUNTER (OUTPATIENT)
Dept: ULTRASOUND IMAGING | Facility: HOSPITAL | Age: 36
Discharge: HOME | End: 2021-03-23
Attending: SURGERY
Payer: MEDICARE

## 2021-03-23 DIAGNOSIS — T82.318A BREAKDOWN (MECHANICAL) OF OTHER VASCULAR GRAFTS, INITIAL ENCOUNTER (HCC): ICD-10-CM

## 2021-03-23 DIAGNOSIS — N18.6 END STAGE RENAL DISEASE (HCC): ICD-10-CM

## 2021-03-23 PROCEDURE — 93990 DOPPLER FLOW TESTING: CPT | Performed by: SURGERY

## 2021-04-01 ENCOUNTER — OFFICE VISIT (OUTPATIENT)
Dept: PODIATRY CLINIC | Facility: CLINIC | Age: 36
End: 2021-04-01
Payer: MEDICARE

## 2021-04-01 DIAGNOSIS — L84 CALLUS OF FOOT: ICD-10-CM

## 2021-04-01 DIAGNOSIS — Z89.411 HISTORY OF PARTIAL RAY AMPUTATION OF FIRST TOE OF RIGHT FOOT (HCC): ICD-10-CM

## 2021-04-01 DIAGNOSIS — E11.42 DIABETIC PERIPHERAL NEUROPATHY (HCC): Primary | ICD-10-CM

## 2021-04-01 PROCEDURE — 99213 OFFICE O/P EST LOW 20 MIN: CPT | Performed by: PODIATRIST

## 2021-04-01 NOTE — PROGRESS NOTES
Juan C Schofield is a 38year old male. Patient presents with: Follow - Up: s/p left foot -- sx 12/16/20. Denies fever and pain. BG was not taken today.          HPI:   Patient returns to clinic for a checkup on his left foot he has had an appointment fo ([facility]     [facility] [place] M[facility]W-F   • High blood pressure    • High cholesterol    • History of blood transfusion     10/2019   • Neuropathy    • Renal disorder       Past Surgical History:   Procedure Laterality Date   • A.V. FISTULA Right 2/11/2 Partner Violence:       Fear of Current or Ex-Partner:       Emotionally Abused:       Physically Abused:       Sexually Abused:         REVIEW OF SYSTEMS:   Today reviewed systens as documented below  GENERAL HEALTH: feels well otherwise  SKIN: Refer to e

## 2021-05-03 ENCOUNTER — OFFICE VISIT (OUTPATIENT)
Dept: PODIATRY CLINIC | Facility: CLINIC | Age: 36
End: 2021-05-03
Payer: MEDICARE

## 2021-05-03 DIAGNOSIS — E11.42 DIABETIC PERIPHERAL NEUROPATHY (HCC): Primary | ICD-10-CM

## 2021-05-03 DIAGNOSIS — Z89.411 HISTORY OF PARTIAL RAY AMPUTATION OF FIRST TOE OF RIGHT FOOT (HCC): ICD-10-CM

## 2021-05-03 DIAGNOSIS — L84 CALLUS OF FOOT: ICD-10-CM

## 2021-05-03 PROCEDURE — 99213 OFFICE O/P EST LOW 20 MIN: CPT | Performed by: PODIATRIST

## 2021-05-03 NOTE — PROGRESS NOTES
Suzanne Daniel is a 38year old male. Patient presents with: Follow - Up: did not check BS this AM - left foot sx on 12/16/20 - foot feels fine - denies any pain.         HPI:   This pleasant patient returns to the clinic now for checkup on his left fo History of blood transfusion     10/2019   • Neuropathy    • Renal disorder       Past Surgical History:   Procedure Laterality Date   • AMPUTATION METATARSAL+TOE,SINGLE     • AV FISTULA REVISION, OPEN     • CATARACT     • OTHER      amputation of R great partial ray amputation of first toe of right foot ([facility] [address] [address]        Plan:  Today using a 15 blade trimmed down debrided the hyperkeratosis on his left foot he can use a pumice stone on it every other day will keep his feet moisturized he will wear his diabetic s

## 2021-07-06 ENCOUNTER — OFFICE VISIT (OUTPATIENT)
Dept: PODIATRY CLINIC | Facility: CLINIC | Age: 36
End: 2021-07-06
Payer: MEDICARE

## 2021-07-06 DIAGNOSIS — E11.42 DIABETIC PERIPHERAL NEUROPATHY (HCC): Primary | ICD-10-CM

## 2021-07-06 DIAGNOSIS — N18.6 ESRD ON HEMODIALYSIS (HCC): ICD-10-CM

## 2021-07-06 DIAGNOSIS — B35.1 OM (ONYCHOMYCOSIS): ICD-10-CM

## 2021-07-06 DIAGNOSIS — L84 CALLUS OF FOOT: ICD-10-CM

## 2021-07-06 DIAGNOSIS — Z89.411 HISTORY OF PARTIAL RAY AMPUTATION OF FIRST TOE OF RIGHT FOOT (HCC): ICD-10-CM

## 2021-07-06 DIAGNOSIS — L84 HELOMA DURUM: ICD-10-CM

## 2021-07-06 DIAGNOSIS — M20.41 HAMMER TOE OF RIGHT FOOT: ICD-10-CM

## 2021-07-06 DIAGNOSIS — Z99.2 ESRD ON HEMODIALYSIS (HCC): ICD-10-CM

## 2021-07-06 PROCEDURE — 11721 DEBRIDE NAIL 6 OR MORE: CPT | Performed by: PODIATRIST

## 2021-07-06 NOTE — PROGRESS NOTES
Cheri Santo is a 43year old male. Patient presents with:  Diabetic Foot Care: Presents for foot care, reports surgery done December, R pinky toe calus         HPI:   Patient was seen today for regular exam checkup and routine diabetic foot care.   H pressure    • High cholesterol    • History of blood transfusion     10/2019   • Neuropathy    • Renal disorder       Past Surgical History:   Procedure Laterality Date   • AMPUTATION METATARSAL+TOE,SINGLE     • AV FISTULA REVISION, OPEN     • CATARACT pain sensation he cannot feel the Thomas-Billie 10 g filament on his feet.    4. Musculoskeletal: The patient has a partial first ray amputation on the right and a partial fifth ray amputation on the left and adductovarus fifth toe with the distal tip ke

## 2021-09-07 ENCOUNTER — ANESTHESIA EVENT (OUTPATIENT)
Dept: SURGERY | Facility: HOSPITAL | Age: 36
DRG: 617 | End: 2021-09-07
Payer: MEDICARE

## 2021-09-07 ENCOUNTER — OFFICE VISIT (OUTPATIENT)
Dept: PODIATRY CLINIC | Facility: CLINIC | Age: 36
End: 2021-09-07
Payer: MEDICARE

## 2021-09-07 ENCOUNTER — HOSPITAL ENCOUNTER (INPATIENT)
Facility: HOSPITAL | Age: 36
LOS: 2 days | Discharge: HOME | DRG: 617 | End: 2021-09-10
Attending: EMERGENCY MEDICINE | Admitting: INTERNAL MEDICINE
Payer: MEDICARE

## 2021-09-07 ENCOUNTER — TELEPHONE (OUTPATIENT)
Dept: CASE MANAGEMENT | Facility: HOSPITAL | Age: 36
End: 2021-09-07

## 2021-09-07 DIAGNOSIS — S91.301S OPEN WOUND OF RIGHT FOOT, SEQUELA: ICD-10-CM

## 2021-09-07 DIAGNOSIS — L84 CALLUS OF FOOT: ICD-10-CM

## 2021-09-07 DIAGNOSIS — M86.9 OSTEOMYELITIS OF TOE (HCC): ICD-10-CM

## 2021-09-07 DIAGNOSIS — E11.42 DIABETIC PERIPHERAL NEUROPATHY (HCC): Primary | ICD-10-CM

## 2021-09-07 DIAGNOSIS — N18.6 ESRD ON HEMODIALYSIS (HCC): ICD-10-CM

## 2021-09-07 DIAGNOSIS — Z99.2 ESRD ON HEMODIALYSIS (HCC): ICD-10-CM

## 2021-09-07 DIAGNOSIS — M86.9 OSTEOMYELITIS, UNSPECIFIED SITE, UNSPECIFIED TYPE (HCC): Primary | ICD-10-CM

## 2021-09-07 LAB
ALBUMIN SERPL-MCNC: 3.3 G/DL (ref 3.4–5)
ALBUMIN/GLOB SERPL: 0.7 {RATIO} (ref 1–2)
ALP LIVER SERPL-CCNC: 85 U/L
ALT SERPL-CCNC: 23 U/L
ANION GAP SERPL CALC-SCNC: 10 MMOL/L (ref 0–18)
AST SERPL-CCNC: 18 U/L (ref 15–37)
BASOPHILS # BLD AUTO: 0.04 X10(3) UL (ref 0–0.2)
BASOPHILS NFR BLD AUTO: 0.5 %
BILIRUB SERPL-MCNC: 0.4 MG/DL (ref 0.1–2)
BUN BLD-MCNC: 58 MG/DL (ref 7–18)
CALCIUM BLD-MCNC: 9.3 MG/DL (ref 8.5–10.1)
CHLORIDE SERPL-SCNC: 100 MMOL/L (ref 98–112)
CO2 SERPL-SCNC: 26 MMOL/L (ref 21–32)
CREAT BLD-MCNC: 11.6 MG/DL
EOSINOPHIL # BLD AUTO: 0.09 X10(3) UL (ref 0–0.7)
EOSINOPHIL NFR BLD AUTO: 1.1 %
ERYTHROCYTE [DISTWIDTH] IN BLOOD BY AUTOMATED COUNT: 14.6 %
GLOBULIN PLAS-MCNC: 4.7 G/DL (ref 2.8–4.4)
GLUCOSE BLD-MCNC: 106 MG/DL (ref 70–99)
HCT VFR BLD AUTO: 30.7 %
HGB BLD-MCNC: 10 G/DL
IMM GRANULOCYTES # BLD AUTO: 0.03 X10(3) UL (ref 0–1)
IMM GRANULOCYTES NFR BLD: 0.4 %
LYMPHOCYTES # BLD AUTO: 1.87 X10(3) UL (ref 1–4)
LYMPHOCYTES NFR BLD AUTO: 22.5 %
M PROTEIN MFR SERPL ELPH: 8 G/DL (ref 6.4–8.2)
MCH RBC QN AUTO: 30.3 PG (ref 26–34)
MCHC RBC AUTO-ENTMCNC: 32.6 G/DL (ref 31–37)
MCV RBC AUTO: 93 FL
MONOCYTES # BLD AUTO: 0.92 X10(3) UL (ref 0.1–1)
MONOCYTES NFR BLD AUTO: 11.1 %
NEUTROPHILS # BLD AUTO: 5.37 X10 (3) UL (ref 1.5–7.7)
NEUTROPHILS # BLD AUTO: 5.37 X10(3) UL (ref 1.5–7.7)
NEUTROPHILS NFR BLD AUTO: 64.4 %
OSMOLALITY SERPL CALC.SUM OF ELEC: 299 MOSM/KG (ref 275–295)
PLATELET # BLD AUTO: 279 10(3)UL (ref 150–450)
POTASSIUM SERPL-SCNC: 4.5 MMOL/L (ref 3.5–5.1)
RBC # BLD AUTO: 3.3 X10(6)UL
SARS-COV-2 RNA RESP QL NAA+PROBE: NOT DETECTED
SODIUM SERPL-SCNC: 136 MMOL/L (ref 136–145)
WBC # BLD AUTO: 8.3 X10(3) UL (ref 4–11)

## 2021-09-07 PROCEDURE — 99213 OFFICE O/P EST LOW 20 MIN: CPT | Performed by: PODIATRIST

## 2021-09-07 PROCEDURE — 99223 1ST HOSP IP/OBS HIGH 75: CPT | Performed by: INTERNAL MEDICINE

## 2021-09-07 NOTE — PROGRESS NOTES
Luigi Alaniz is a 43year old male. Patient presents with: Follow - Up: callous on right foot and surgery on left foot.         HPI:   This pleasant patient returns to clinic for routine foot care he has no complaints of pain but then again he has a [facility])    • Dialysis patient [facility])     [address] M-W-F   • High blood pressure    • High cholesterol    • History of blood transfusion     10/2019   • Neuropathy    • Renal disorder       Past Surgical History:   Procedure Laterality Date right.    ASSESSMENT AND PLAN:   Diagnoses and all orders for this visit:    Diabetic peripheral neuropathy ([address]    Callus of foot    ESRD on hemodialysis ([address]    Open wound of right foot, sequela    Osteomyelitis of toe ([address]        Plan:  At this point

## 2021-09-08 ENCOUNTER — ANESTHESIA (OUTPATIENT)
Dept: SURGERY | Facility: HOSPITAL | Age: 36
DRG: 617 | End: 2021-09-08
Payer: MEDICARE

## 2021-09-08 ENCOUNTER — APPOINTMENT (OUTPATIENT)
Dept: GENERAL RADIOLOGY | Facility: HOSPITAL | Age: 36
DRG: 617 | End: 2021-09-08
Attending: PODIATRIST
Payer: MEDICARE

## 2021-09-08 PROBLEM — M86.9 OSTEOMYELITIS, UNSPECIFIED SITE, UNSPECIFIED TYPE (HCC): Status: ACTIVE | Noted: 2021-09-08

## 2021-09-08 LAB
ANION GAP SERPL CALC-SCNC: 6 MMOL/L (ref 0–18)
BASOPHILS # BLD AUTO: 0.04 X10(3) UL (ref 0–0.2)
BASOPHILS NFR BLD AUTO: 0.7 %
BUN BLD-MCNC: 60 MG/DL (ref 7–18)
CALCIUM BLD-MCNC: 9 MG/DL (ref 8.5–10.1)
CHLORIDE SERPL-SCNC: 101 MMOL/L (ref 98–112)
CO2 SERPL-SCNC: 28 MMOL/L (ref 21–32)
CREAT BLD-MCNC: 12.4 MG/DL
EOSINOPHIL # BLD AUTO: 0.08 X10(3) UL (ref 0–0.7)
EOSINOPHIL NFR BLD AUTO: 1.3 %
ERYTHROCYTE [DISTWIDTH] IN BLOOD BY AUTOMATED COUNT: 14.3 %
EST. AVERAGE GLUCOSE BLD GHB EST-MCNC: 180 MG/DL (ref 68–126)
GLUCOSE BLD-MCNC: 120 MG/DL (ref 70–99)
GLUCOSE BLD-MCNC: 150 MG/DL (ref 70–99)
GLUCOSE BLD-MCNC: 163 MG/DL (ref 70–99)
GLUCOSE BLD-MCNC: 163 MG/DL (ref 70–99)
GLUCOSE BLD-MCNC: 173 MG/DL (ref 70–99)
HBA1C MFR BLD HPLC: 7.9 % (ref ?–5.7)
HCT VFR BLD AUTO: 31.2 %
HGB BLD-MCNC: 9.7 G/DL
IMM GRANULOCYTES # BLD AUTO: 0.02 X10(3) UL (ref 0–1)
IMM GRANULOCYTES NFR BLD: 0.3 %
LYMPHOCYTES # BLD AUTO: 1.5 X10(3) UL (ref 1–4)
LYMPHOCYTES NFR BLD AUTO: 24.5 %
MCH RBC QN AUTO: 29.4 PG (ref 26–34)
MCHC RBC AUTO-ENTMCNC: 31.1 G/DL (ref 31–37)
MCV RBC AUTO: 94.5 FL
MONOCYTES # BLD AUTO: 0.69 X10(3) UL (ref 0.1–1)
MONOCYTES NFR BLD AUTO: 11.3 %
NEUTROPHILS # BLD AUTO: 3.79 X10 (3) UL (ref 1.5–7.7)
NEUTROPHILS # BLD AUTO: 3.79 X10(3) UL (ref 1.5–7.7)
NEUTROPHILS NFR BLD AUTO: 61.9 %
OSMOLALITY SERPL CALC.SUM OF ELEC: 300 MOSM/KG (ref 275–295)
PLATELET # BLD AUTO: 266 10(3)UL (ref 150–450)
POTASSIUM SERPL-SCNC: 4.7 MMOL/L (ref 3.5–5.1)
RBC # BLD AUTO: 3.3 X10(6)UL
SODIUM SERPL-SCNC: 135 MMOL/L (ref 136–145)
WBC # BLD AUTO: 6.1 X10(3) UL (ref 4–11)

## 2021-09-08 PROCEDURE — 5A1D70Z PERFORMANCE OF URINARY FILTRATION, INTERMITTENT, LESS THAN 6 HOURS PER DAY: ICD-10-PCS | Performed by: INTERNAL MEDICINE

## 2021-09-08 PROCEDURE — 3E0T3BZ INTRODUCTION OF ANESTHETIC AGENT INTO PERIPHERAL NERVES AND PLEXI, PERCUTANEOUS APPROACH: ICD-10-PCS | Performed by: ANESTHESIOLOGY

## 2021-09-08 PROCEDURE — 0Y6X0Z0 DETACHMENT AT RIGHT 5TH TOE, COMPLETE, OPEN APPROACH: ICD-10-PCS | Performed by: PODIATRIST

## 2021-09-08 PROCEDURE — 28820 AMPUTATION OF TOE: CPT | Performed by: PODIATRIST

## 2021-09-08 PROCEDURE — 99232 SBSQ HOSP IP/OBS MODERATE 35: CPT | Performed by: INTERNAL MEDICINE

## 2021-09-08 PROCEDURE — 76000 FLUOROSCOPY <1 HR PHYS/QHP: CPT | Performed by: PODIATRIST

## 2021-09-08 PROCEDURE — 99222 1ST HOSP IP/OBS MODERATE 55: CPT | Performed by: INTERNAL MEDICINE

## 2021-09-08 RX ORDER — DOCUSATE SODIUM 100 MG/1
100 CAPSULE, LIQUID FILLED ORAL 2 TIMES DAILY PRN
Status: DISCONTINUED | OUTPATIENT
Start: 2021-09-08 | End: 2021-09-10

## 2021-09-08 RX ORDER — MORPHINE SULFATE 2 MG/ML
2 INJECTION, SOLUTION INTRAMUSCULAR; INTRAVENOUS EVERY 2 HOUR PRN
Status: DISCONTINUED | OUTPATIENT
Start: 2021-09-08 | End: 2021-09-10

## 2021-09-08 RX ORDER — ATORVASTATIN CALCIUM 40 MG/1
40 TABLET, FILM COATED ORAL NIGHTLY
Status: DISCONTINUED | OUTPATIENT
Start: 2021-09-08 | End: 2021-09-10

## 2021-09-08 RX ORDER — CALCIUM ACETATE 667 MG/1
2 CAPSULE ORAL 3 TIMES DAILY
Status: DISCONTINUED | OUTPATIENT
Start: 2021-09-08 | End: 2021-09-10

## 2021-09-08 RX ORDER — NALOXONE HYDROCHLORIDE 0.4 MG/ML
80 INJECTION, SOLUTION INTRAMUSCULAR; INTRAVENOUS; SUBCUTANEOUS AS NEEDED
Status: DISCONTINUED | OUTPATIENT
Start: 2021-09-08 | End: 2021-09-08 | Stop reason: HOSPADM

## 2021-09-08 RX ORDER — HYDROCODONE BITARTRATE AND ACETAMINOPHEN 5; 325 MG/1; MG/1
1 TABLET ORAL AS NEEDED
Status: DISCONTINUED | OUTPATIENT
Start: 2021-09-08 | End: 2021-09-08 | Stop reason: HOSPADM

## 2021-09-08 RX ORDER — EPHEDRINE SULFATE 50 MG/ML
INJECTION INTRAVENOUS AS NEEDED
Status: DISCONTINUED | OUTPATIENT
Start: 2021-09-08 | End: 2021-09-08 | Stop reason: SURG

## 2021-09-08 RX ORDER — DEXTROSE MONOHYDRATE 25 G/50ML
50 INJECTION, SOLUTION INTRAVENOUS
Status: DISCONTINUED | OUTPATIENT
Start: 2021-09-08 | End: 2021-09-10

## 2021-09-08 RX ORDER — SODIUM CHLORIDE 9 MG/ML
INJECTION, SOLUTION INTRAVENOUS CONTINUOUS PRN
Status: DISCONTINUED | OUTPATIENT
Start: 2021-09-08 | End: 2021-09-08 | Stop reason: SURG

## 2021-09-08 RX ORDER — CINACALCET 30 MG/1
30 TABLET, FILM COATED ORAL
Status: DISCONTINUED | OUTPATIENT
Start: 2021-09-08 | End: 2021-09-10

## 2021-09-08 RX ORDER — ONDANSETRON 2 MG/ML
4 INJECTION INTRAMUSCULAR; INTRAVENOUS EVERY 6 HOURS PRN
Status: DISCONTINUED | OUTPATIENT
Start: 2021-09-08 | End: 2021-09-10

## 2021-09-08 RX ORDER — CEFAZOLIN SODIUM 1 G/3ML
INJECTION, POWDER, FOR SOLUTION INTRAMUSCULAR; INTRAVENOUS AS NEEDED
Status: DISCONTINUED | OUTPATIENT
Start: 2021-09-08 | End: 2021-09-08 | Stop reason: SURG

## 2021-09-08 RX ORDER — LIDOCAINE HYDROCHLORIDE 10 MG/ML
INJECTION, SOLUTION EPIDURAL; INFILTRATION; INTRACAUDAL; PERINEURAL AS NEEDED
Status: DISCONTINUED | OUTPATIENT
Start: 2021-09-08 | End: 2021-09-08 | Stop reason: SURG

## 2021-09-08 RX ORDER — DEXTROSE MONOHYDRATE 25 G/50ML
50 INJECTION, SOLUTION INTRAVENOUS
Status: DISCONTINUED | OUTPATIENT
Start: 2021-09-08 | End: 2021-09-08 | Stop reason: HOSPADM

## 2021-09-08 RX ORDER — HYDROMORPHONE HYDROCHLORIDE 1 MG/ML
0.4 INJECTION, SOLUTION INTRAMUSCULAR; INTRAVENOUS; SUBCUTANEOUS EVERY 5 MIN PRN
Status: DISCONTINUED | OUTPATIENT
Start: 2021-09-08 | End: 2021-09-08 | Stop reason: HOSPADM

## 2021-09-08 RX ORDER — MORPHINE SULFATE 4 MG/ML
4 INJECTION, SOLUTION INTRAMUSCULAR; INTRAVENOUS EVERY 2 HOUR PRN
Status: DISCONTINUED | OUTPATIENT
Start: 2021-09-08 | End: 2021-09-10

## 2021-09-08 RX ORDER — HYDROCODONE BITARTRATE AND ACETAMINOPHEN 5; 325 MG/1; MG/1
1 TABLET ORAL EVERY 4 HOURS PRN
Status: DISCONTINUED | OUTPATIENT
Start: 2021-09-08 | End: 2021-09-10

## 2021-09-08 RX ORDER — HEPARIN SODIUM 5000 [USP'U]/ML
5000 INJECTION, SOLUTION INTRAVENOUS; SUBCUTANEOUS EVERY 8 HOURS SCHEDULED
Status: DISCONTINUED | OUTPATIENT
Start: 2021-09-08 | End: 2021-09-10

## 2021-09-08 RX ORDER — AMLODIPINE BESYLATE 5 MG/1
10 TABLET ORAL DAILY
Status: DISCONTINUED | OUTPATIENT
Start: 2021-09-08 | End: 2021-09-10

## 2021-09-08 RX ORDER — METOPROLOL SUCCINATE 25 MG/1
25 TABLET, EXTENDED RELEASE ORAL DAILY
Status: DISCONTINUED | OUTPATIENT
Start: 2021-09-08 | End: 2021-09-10

## 2021-09-08 RX ORDER — ONDANSETRON 2 MG/ML
INJECTION INTRAMUSCULAR; INTRAVENOUS AS NEEDED
Status: DISCONTINUED | OUTPATIENT
Start: 2021-09-08 | End: 2021-09-08 | Stop reason: SURG

## 2021-09-08 RX ORDER — PROCHLORPERAZINE EDISYLATE 5 MG/ML
5 INJECTION INTRAMUSCULAR; INTRAVENOUS EVERY 8 HOURS PRN
Status: DISCONTINUED | OUTPATIENT
Start: 2021-09-08 | End: 2021-09-10

## 2021-09-08 RX ORDER — HYDROCODONE BITARTRATE AND ACETAMINOPHEN 5; 325 MG/1; MG/1
2 TABLET ORAL AS NEEDED
Status: DISCONTINUED | OUTPATIENT
Start: 2021-09-08 | End: 2021-09-08 | Stop reason: HOSPADM

## 2021-09-08 RX ORDER — MELATONIN
3 NIGHTLY PRN
Status: DISCONTINUED | OUTPATIENT
Start: 2021-09-08 | End: 2021-09-10

## 2021-09-08 RX ORDER — MORPHINE SULFATE 2 MG/ML
1 INJECTION, SOLUTION INTRAMUSCULAR; INTRAVENOUS EVERY 2 HOUR PRN
Status: DISCONTINUED | OUTPATIENT
Start: 2021-09-08 | End: 2021-09-10

## 2021-09-08 RX ORDER — DEXAMETHASONE SODIUM PHOSPHATE 4 MG/ML
VIAL (ML) INJECTION AS NEEDED
Status: DISCONTINUED | OUTPATIENT
Start: 2021-09-08 | End: 2021-09-08 | Stop reason: SURG

## 2021-09-08 RX ORDER — ACETAMINOPHEN 325 MG/1
650 TABLET ORAL EVERY 4 HOURS PRN
Status: DISCONTINUED | OUTPATIENT
Start: 2021-09-08 | End: 2021-09-10

## 2021-09-08 RX ORDER — MIDAZOLAM HYDROCHLORIDE 1 MG/ML
1 INJECTION INTRAMUSCULAR; INTRAVENOUS EVERY 5 MIN PRN
Status: DISCONTINUED | OUTPATIENT
Start: 2021-09-08 | End: 2021-09-08 | Stop reason: HOSPADM

## 2021-09-08 RX ORDER — BUPIVACAINE HYDROCHLORIDE 5 MG/ML
INJECTION, SOLUTION EPIDURAL; INTRACAUDAL AS NEEDED
Status: DISCONTINUED | OUTPATIENT
Start: 2021-09-08 | End: 2021-09-08 | Stop reason: HOSPADM

## 2021-09-08 RX ORDER — POLYETHYLENE GLYCOL 3350 17 G/17G
17 POWDER, FOR SOLUTION ORAL DAILY PRN
Status: DISCONTINUED | OUTPATIENT
Start: 2021-09-08 | End: 2021-09-10

## 2021-09-08 RX ORDER — VANCOMYCIN HYDROCHLORIDE
15 ONCE
Status: COMPLETED | OUTPATIENT
Start: 2021-09-08 | End: 2021-09-08

## 2021-09-08 RX ORDER — HYDROCODONE BITARTRATE AND ACETAMINOPHEN 5; 325 MG/1; MG/1
2 TABLET ORAL EVERY 4 HOURS PRN
Status: DISCONTINUED | OUTPATIENT
Start: 2021-09-08 | End: 2021-09-10

## 2021-09-08 RX ADMIN — DEXAMETHASONE SODIUM PHOSPHATE 4 MG: 4 MG/ML VIAL (ML) INJECTION at 07:30:00

## 2021-09-08 RX ADMIN — LIDOCAINE HYDROCHLORIDE 50 MG: 10 INJECTION, SOLUTION EPIDURAL; INFILTRATION; INTRACAUDAL; PERINEURAL at 07:17:00

## 2021-09-08 RX ADMIN — SODIUM CHLORIDE: 9 INJECTION, SOLUTION INTRAVENOUS at 07:11:00

## 2021-09-08 RX ADMIN — EPHEDRINE SULFATE 5 MG: 50 INJECTION INTRAVENOUS at 07:34:00

## 2021-09-08 RX ADMIN — ONDANSETRON 4 MG: 2 INJECTION INTRAMUSCULAR; INTRAVENOUS at 07:36:00

## 2021-09-08 RX ADMIN — CEFAZOLIN SODIUM 1 G: 1 INJECTION, POWDER, FOR SOLUTION INTRAMUSCULAR; INTRAVENOUS at 07:36:00

## 2021-09-08 NOTE — BRIEF OP NOTE
Pre-Operative Diagnosis: osteomyelitis fifth toe right foot     Post-Operative Diagnosis: osteomyelitis  fifth toe right foot     Procedure Performed:   AMPUTATION OF RIGHT 5TH TOE, disarticulation at the MTP joint    Surgeon(s) and Role:     * Betsy Kerr

## 2021-09-08 NOTE — PROGRESS NOTES
Pharmacy Note:   P&T approved dose adjustment for: Zochalino    Lisa Pawel has been prescribed Zosyn 3.375 g IV every 8 hrs.        ESRD on HD Wt Readings from Last 1 Encounters:  02/12/21 : 87 kg (191 lb 12.8 oz)   Wt Readings from Last 1 Encounters:

## 2021-09-08 NOTE — CONSULTS
[facility]  Report of Consultation    Jaden Razokervin Patient Status:  Inpatient    1985 MRN [id number]   [place] 3SW-A Attending Dillon August MD   Hosp Day # 0 PCP Nuha Lee     Reason for Consultation:  ESRD/foot wound Intravenous, Q15 Min PRN **OR** glucose (DEX4) oral liquid 30 g, 30 g, Oral, Q15 Min PRN **OR** glucose-vitamin C (DEX-4) chewable tab 8 tablet, 8 tablet, Oral, Q15 Min PRN  •  insulin detemir (LEVEMIR) 100 UNIT/ML flextouch 3 Units, 3 Units, Subcutaneous, SpO2 100%   Temp (24hrs), Av °F (36.7 °C), Min:97.4 °F (36.3 °C), Max:98.6 °F (37 °C)       Intake/Output Summary (Last 24 hours) at 2021 1152  Last data filed at 2021 0905  Gross per 24 hour   Intake 100 ml   Output 10 ml   Net 90 ml     Last MICROALBCREA    Recent Labs   Lab 09/07/21 2236 09/08/21  0510   WBC 8.3 6.1   HGB 10.0* 9.7*   MCV 93.0 94.5   .0 266.0       Recent Labs   Lab 09/07/21 2229 09/08/21  0510    135*   K 4.5 4.7    101   CO2 26.0 28.0   BUN 58* 60*   CR

## 2021-09-08 NOTE — CONSULTS
INFECTIOUS DISEASE CONSULTATION    Agusto Reardon Patient Status:  Inpatient    1985 MRN [id number]   [facility] 3SW-A Attending Dung Barbour MD   Hosp Day # 0 PCP Arcadio Monreal tablet, Oral, Q15 Min PRN  •  Insulin Aspart Pen (NOVOLOG) 100 UNIT/ML flexpen 1-5 Units, 1-5 Units, Subcutaneous, TID CC and HS  •  Heparin Sodium (Porcine) 5000 UNIT/ML injection 5,000 Units, 5,000 Units, Subcutaneous, Q8H [facility]  •  acetaminophen (TYLENOL) Tube, Rfl: 2  amLODIPine Besylate 10 MG Oral Tab, Take 1 tablet (10 mg total) by mouth daily. , Disp: 90 tablet, Rfl: 3  Metoprolol Succinate ER 25 MG Oral Tablet 24 Hr, Take 1 tablet (25 mg total) by mouth daily. , Disp: 30 tablet, Rfl: 11  atorvastatin 40 Microbiologic Data:   Hospital Encounter on 09/07/21   1.  Tissue Aerobic Culture     Status: Abnormal (Preliminary result)    Collection Time: 09/08/21  7:31 AM    Specimen: Toe; Tissue   Result Value Ref Range    Tissue Smear 1+ WBCs seen (A) N/A

## 2021-09-08 NOTE — H&P
[facility]  History and Physical     Ricardo Gumaro Patient Status:  Inpatient    1985 MRN [id number]   [facility] 3SW-A Attending Kasi Damon DO   Hosp Day # 0 PCP [facility] Dietrich     Chief Complaint: Right 5th toe draining Ointment, Plan a small amount to the opening on the top of the left foot and cover with a large Band-Aid or gauze dressing and tape once daily after cleansing., Disp: 1 Tube, Rfl: 2  amLODIPine Besylate 10 MG Oral Tab, Take 1 tablet (10 mg total) by mouth Appropriate mood and affect.     Diagnostic Data:      Labs:  Recent Labs   Lab 09/07/21  2236   WBC 8.3   HGB 10.0*   MCV 93.0   .0     Recent Labs   Lab 09/07/21  2229   *   BUN 58*   CREATSERUM 11.60*   GFRAA 6*   GFRNAA 5*   CA 9.3   ALB 3

## 2021-09-08 NOTE — PROGRESS NOTES
[facility]  Progress Note     Rakesh Platt Patient Status:  Inpatient    1985 MRN [id number]   [facility] [place] 3SW-A Attending Loli Mejia MD   Hosp Day # 0 PCP Carmen Steward     Chief Complaint: toe infectin     S: Patient get COVID19 Not Detected 02/01/2021       Pro-Calcitonin  No results for input(s): PCT in the last 168 hours. Cardiac  No results for input(s): TROP, PBNP in the last 168 hours. Creatinine Kinase  No results for input(s): CK in the last 168 hours.     Inf

## 2021-09-08 NOTE — ED PROVIDER NOTES
Patient Seen in: [facility] Emergency Department      History   Patient presents with:  Cellulitis    Stated Complaint: foot infection     HPI/Subjective:   HPI    Patient is a 41-year-old male presents to emergency room for evaluation of osteomyeli membranes. Pupils equal round reactive to light and accommodation, extraocular motion is intact, sclerae white, conjunctiva is pink. Oropharynx is unremarkable, no exudate. NECK: Supple, trachea midline, no lymphadenopathy.    LUNG: Lungs clear to auscul Narrative: The following orders were created for panel order CBC With Differential With Platelet.   Procedure                               Abnormality         Status                     ---------                               -----------         --- administration in time range)     Or   HYDROcodone-acetaminophen (NORCO) 5-325 MG per tab 2 tablet (has no administration in time range)   morphINE sulfate (PF) 2 MG/ML injection 1 mg (has no administration in time range)     Or   morphINE sulfate (PF) 2 M ICD-10-CM Noted POA    * (Principal) Osteomyelitis, unspecified site, unspecified type ([facility] [address] M86.9 9/8/2021     Osteomyelitis ([facility] [address] M86.9 7/3/2020 Unknown

## 2021-09-08 NOTE — OPERATIVE REPORT
[facility]     OPERATIVE REPORT    Murguia Brood    CSN [id number] MRN [id number]    1985 Age 43year old   Admission Date 2021 Operation Date 2021   Attending Physician Maximo Andrews MD Operating Physician Naveen Redding DPM appropriate timeout was taken along with additions deletions or concerns reported. Anesthesia was administered the fifth ray was anesthetized with Marcaine as above.     The foot was prepped and draped using usual aseptic technique hemostasis was achieved

## 2021-09-08 NOTE — ANESTHESIA PREPROCEDURE EVALUATION
PRE-OP EVALUATION    Patient Name: Rakesh Platt    Admit Diagnosis: Osteomyelitis, unspecified site, unspecified type ([facility] [M86.9]    Pre-op Diagnosis: osteomyelitis    AMPUTATION OF RIGHT 5TH TOE    Anesthesia Procedure: AMPUTATION OF RIGHT 5TH TOE PRN   Or  morphINE sulfate (PF) 2 MG/ML injection 2 mg, 2 mg, Intravenous, Q2H PRN   Or  morphINE sulfate (PF) 4 MG/ML injection 4 mg, 4 mg, Intravenous, Q2H PRN  melatonin tab 3 mg, 3 mg, Oral, Nightly PRN  ondansetron (ZOFRAN) injection 4 mg, 4 mg, Intra time  insulin glargine 100 UNIT/ML Subcutaneous Solution, Inject 6 Units into the skin nightly.  , Disp: , Rfl:   Glucose Blood (ONETOUCH ULTRA BLUE) In Vitro Strip, 1 each by Other route daily.  Test once daily, Disp: 100 each, Rfl: prn        Allergies: P auscultation bilaterally. Other findings            ASA: 3   Plan: general  NPO status verified and patient meets guidelines. Post-procedure pain management plan discussed with surgeon and patient.     Comment: Risks include but limited to

## 2021-09-08 NOTE — ANESTHESIA POSTPROCEDURE EVALUATION
[address] Patient Status:  Inpatient   Age/Gender 43year old male MRN [id number]   [facility] SURGERY Attending Loli Mejia MD   Hosp Day # 0 PCP Carmen Steward       Anesthesia Post-op Note    AMPUTATION OF RIGHT 5

## 2021-09-08 NOTE — PLAN OF CARE
Patient admitted via cart from ED. Oriented to room. A&Ox4. Hx neuropathy to bilat feet. + pedal pulses. Open draining wound on Rt foot 5th toe. VSS on RA. AV fistula to R arm. Safety precautions initiated. Bed in lowest position. Call light within reach.

## 2021-09-08 NOTE — PLAN OF CARE
Pt A&Ox4, VSS on room air. IS encouraged. POD #0 R 5th toe amputation. R foot covered with acewrap C/D/I. ID consulted today. IV zosyn q8 and IV vanco dose given. SCDs to BLE. Bedrest with bathroom privileges- surgical shoe needed for ambulation.  YE hankins

## 2021-09-08 NOTE — ANESTHESIA PROCEDURE NOTES
Airway  Date/Time: 9/8/2021 7:19 AM  Urgency: elective    Airway not difficult    General Information and Staff    Patient location during procedure: OR  Anesthesiologist: Debby Ramos MD  Performed: anesthesiologist     Indications and Patient Con

## 2021-09-09 LAB
GLUCOSE BLD-MCNC: 116 MG/DL (ref 70–99)
GLUCOSE BLD-MCNC: 144 MG/DL (ref 70–99)
GLUCOSE BLD-MCNC: 148 MG/DL (ref 70–99)
GLUCOSE BLD-MCNC: 163 MG/DL (ref 70–99)

## 2021-09-09 PROCEDURE — 99232 SBSQ HOSP IP/OBS MODERATE 35: CPT | Performed by: INTERNAL MEDICINE

## 2021-09-09 RX ORDER — HYDROCODONE BITARTRATE AND ACETAMINOPHEN 5; 325 MG/1; MG/1
1 TABLET ORAL EVERY 4 HOURS PRN
Qty: 10 TABLET | Refills: 0 | Status: SHIPPED | OUTPATIENT
Start: 2021-09-09 | End: 2021-10-28

## 2021-09-09 NOTE — PROGRESS NOTES
Postoperative day #1   09/09/21  0830   BP: 132/71   Pulse: 73   Resp: 17   Temp: 97.9 °F (36.6 °C)   Patient relates minimal discomfort pain is having no fevers or chills.     Objective: Dressing is clean dry and intact there is no strikethrough under the

## 2021-09-09 NOTE — PROGRESS NOTES
[facility]     Nephrology Progress Note    Monica Cancel Patient Status:  Inpatient    1985 MRN [id number]   [facility] ([place], [place]) 3SW-A Attending Aaron Goodman MD   Hosp Day # 1 PCP Fabrice Nicole       SUBJECTIVE:  Stable this AM        P CA 9.3 9.0   * 163*       Recent Labs   Lab 09/07/21  2229   ALT 23   AST 18   ALB 3.3*       Recent Labs   Lab 09/08/21  0806 09/08/21  1126 09/08/21  1707 09/08/21  2148 09/09/21  0830   PGLU 120* 150* 163* 173* 148*       Meds:   amLODIPine (NO hydroxide (MILK OF MAGNESIA) 400 MG/5ML suspension 30 mL, 30 mL, Oral, Daily PRN  insulin detemir (LEVEMIR) 100 UNIT/ML flextouch 6 Units, 6 Units, Subcutaneous, Nightly          Impression/Plan:       #1.  ESRD- due to DM.  HD to cont MWF per usual routin

## 2021-09-09 NOTE — PROGRESS NOTES
[facility]                INFECTIOUS DISEASE PROGRESS NOTE    [place] Patient Status:  Inpatient    1985 MRN [id number]   [facility] 3SW-A Attending Debby Burleson MD   Hosp Day # 1 PCP Lajuan Dancer     Antibiotics: Iliana Keyes 1. Tissue Aerobic Culture     Status: Abnormal (Preliminary result)    Collection Time: 09/08/21  7:31 AM    Specimen: Toe; Tissue   Result Value Ref Range    Tissue Culture Result 3+ growth Staphylococcus aureus (A) N/A    Tissue Smear 1+ WBCs seen (A) said he would think about and will continue to offer.       Caryle Bowie, MD, MD  [facility]  [phone]

## 2021-09-09 NOTE — PLAN OF CARE
A&O x4. VSS on RA. Denies pain medication overnight. Bilateral SCDs. IV Zosyn per ID. R foot AW dressing C/D/I. Bedrest w BRP, up with post op shoe to R. R arm precautions d/t AV fistula. Reviewed POC, pain management, IS use, and fall precautions with pt.

## 2021-09-09 NOTE — PROGRESS NOTES
[facility]  Progress Note     Daphnie French Patient Status:  Inpatient    1985 MRN [id number]   [facility] 3SW-A Attending Waldo Gerardo MD   Hosp Day # 1 PCP Beverly Villafuerte     Chief Complaint: wound infection     S: Patient COVID19 Not Detected 02/01/2021       Pro-Calcitonin  No results for input(s): PCT in the last 168 hours. Cardiac  No results for input(s): TROP, PBNP in the last 168 hours. Creatinine Kinase  No results for input(s): CK in the last 168 hours.     Inf

## 2021-09-09 NOTE — PLAN OF CARE
Pt A&Ox4. On room air. IS encouraged. Incision to R foot with acewrap drsg C/D/I. RLE elevated on pillows. Pain controlled at this time. Pt tolerating diabetic diet. QID accu-checks. Insulin given per orders. Pt with diminished urine production.  Dialysis o

## 2021-09-09 NOTE — PHYSICAL THERAPY NOTE
PHYSICAL THERAPY QUICK EVALUATION - INPATIENT    Room Number: 364/364-A  Evaluation Date: 9/9/2021  Presenting Problem: s/p amputation right 5th toe 9/8/21  Physician Order: PT Eval and Treat    Problem List  Principal Problem:    Osteomyelitis, unspecif up    Lower extremity strength is within functional limits except right ankle, ace wrapped, use of post op shoe when up      [address] '[employer]-Clicks' INPATIENT AILEEN use of WC due to these limitations. Pt expressed good understanding. Rn and pct aware of session and assist needed. Patient End of Session: In bed;Needs met;Call light within reach;RN aware of session/findings; All patient questions and concerns addre

## 2021-09-10 VITALS
HEART RATE: 70 BPM | OXYGEN SATURATION: 99 % | TEMPERATURE: 99 F | BODY MASS INDEX: 27 KG/M2 | SYSTOLIC BLOOD PRESSURE: 96 MMHG | DIASTOLIC BLOOD PRESSURE: 56 MMHG | WEIGHT: 199.19 LBS | RESPIRATION RATE: 16 BRPM

## 2021-09-10 LAB
GLUCOSE BLD-MCNC: 113 MG/DL (ref 70–99)
GLUCOSE BLD-MCNC: 99 MG/DL (ref 70–99)

## 2021-09-10 PROCEDURE — 99232 SBSQ HOSP IP/OBS MODERATE 35: CPT | Performed by: INTERNAL MEDICINE

## 2021-09-10 PROCEDURE — 99238 HOSP IP/OBS DSCHRG MGMT 30/<: CPT | Performed by: INTERNAL MEDICINE

## 2021-09-10 NOTE — PLAN OF CARE
Pt. A&Ox4, VSS on room air. IS and ankle pumps encouraged. Dr. Kerrie Chavez changed RLE dressing this AM at bedside with 4x4s, Kerlix, and acewrap- C/D/I. Pt refusing SCDs. Tolerating diabetic diet. Decreased urine output at baseline, BM 9/8/2021.  BRP with surg

## 2021-09-10 NOTE — PROGRESS NOTES
[facility]     Nephrology Progress Note    Alvino Hawkins Patient Status:  Inpatient    1985 MRN [id number]   [facility] 3SW-A Attending Shiva Espana MD   Hosp Day # 2 PCP Nate Lim       SUBJECTIVE:  Stable this AM, no acut 12.40*   CA 9.3 9.0   * 163*       Recent Labs   Lab 09/07/21  2229   ALT 23   AST 18   ALB 3.3*       Recent Labs   Lab 09/09/21  0830 09/09/21  1242 09/09/21  1713 09/09/21  2135 09/10/21  0813   PGLU 148* 144* 116* 163* 99       Meds:   epoetin a PRN  Prochlorperazine Edisylate (COMPAZINE) injection 5 mg, 5 mg, Intravenous, Q8H PRN  docusate sodium (COLACE) cap 100 mg, 100 mg, Oral, BID PRN  PEG 3350 (MIRALAX) powder packet 17 g, 17 g, Oral, Daily PRN  magnesium hydroxide (MILK OF MAGNESIA) 400 MG/

## 2021-09-10 NOTE — DISCHARGE SUMMARY
[facility]OSPITAL[facility]  DISCHARGE SUMMARY     Jaya Jacobo Patient Status:  Inpatient    1985 MRN [id number]   [facility] 3SW-A Attending No att. providers found   Hosp Day # 2 PCP Ranjit Carrera     Date of Admission: 2021  Date of D amLODIPine 10 MG Tabs  Commonly known as: NORVASC      Take 1 tablet (10 mg total) by mouth daily. Quantity: 90 tablet  Refills: 3     atorvastatin 40 MG Tabs  Commonly known as: LIPITOR      Take 40 mg by mouth nightly.    Refills: 0     calcium acetat 1,000 mg in sodium chloride 250 mL         [place]    Follow-up appointment:   Rasheeda Nascimento, [address] [facility] [phone]    Schedule an appointment as soon as possible for a visit in 1 week  For ANGELA > 30 minutes

## 2021-09-10 NOTE — PLAN OF CARE
A&O x4. VSS on RA. Declines pain medication overnight. Refused SCDs. IV abx per ID. R foot AW dressing C/D/I. Up with post op shoe to R foot, 20-30 steps per day. R arm precautions d/t AV fistula.  Reviewed POC, pain management, IS use, and fall precautions

## 2021-09-10 NOTE — PROGRESS NOTES
[facility]                INFECTIOUS DISEASE PROGRESS NOTE    James Bonds Patient Status:  Inpatient    1985 MRN [id number]   [facility] 3SW-A Attending Audra Tim MD   Hosp Day # 2 PCP Tracy Gore     Antibiotics: Cheo Jacob Tissue Aerobic Culture     Status: Abnormal (Preliminary result)    Collection Time: 09/08/21  7:31 AM    Specimen: Toe; Tissue   Result Value Ref Range    Tissue Culture Result 3+ growth Staphylococcus aureus (A) N/A    Tissue Smear 1+ WBCs seen (A) N/A

## 2021-09-10 NOTE — CM/SW NOTE
Spoke with pt's RN who stated pt will need IV vanco with HD x 2 weeks. She updated Dr Dona Bui who will call in orders to HD clinic. SW spoke with Arnaldo Hoyos at [facility] ([phone]) who confirmed he received MD orders for IV vanco w/HD.   No

## 2021-09-10 NOTE — PROGRESS NOTES
Pt cleared for discharge by all MDs. Discharge education complete at bedside, all questions answered. PIV removed, belongings packed. Scripts for iv vancomycin handed to patient. Pt discharging to home via wheelchair.

## 2021-09-10 NOTE — PROGRESS NOTES
09/10/21  0815   BP: 148/80   Pulse: 70   Resp: 16   Temp: 98.6 °F (37 °C)   Patient was seen resting comfortably in bed he has no complaints of pain in his right foot he is currently receiving dialysis.   I have discussed the case with Dr. Cheryle Boning he does

## 2021-09-10 NOTE — TELEPHONE ENCOUNTER
Please make sure the patient comes in on a Tuesday or Thursday of next week for postop visit thank you

## 2021-09-13 ENCOUNTER — OFFICE VISIT (OUTPATIENT)
Dept: PODIATRY CLINIC | Facility: CLINIC | Age: 36
End: 2021-09-13
Payer: MEDICARE

## 2021-09-13 DIAGNOSIS — Z98.890 STATUS POST FOOT SURGERY: Primary | ICD-10-CM

## 2021-09-13 PROCEDURE — 99024 POSTOP FOLLOW-UP VISIT: CPT | Performed by: PODIATRIST

## 2021-09-13 RX ORDER — SEVELAMER CARBONATE 800 MG/1
800 TABLET, FILM COATED ORAL
COMMUNITY
Start: 2021-09-08

## 2021-09-13 NOTE — PROGRESS NOTES
Luigi Alaniz is a 43year old male. Patient presents with:  Post-Op: Sx, 9/8--amputation of R 5th toe. Denies any fevers, pain, numbness, tingling or swelling. Did not check BS this morning.          HPI:   Patient returns to the clinic at this time d each by Other route daily.  Test once daily (Patient not taking: Reported on 9/13/2021) 100 each prn      Past Medical History:   Diagnosis Date   • Diabetes [facility])    • Dialysis patient ([facility]   • High blood pressure    • week.    The patient indicates understanding of these issues and agrees to the plan.     Sadie Ferro DPM

## 2021-09-20 ENCOUNTER — OFFICE VISIT (OUTPATIENT)
Dept: PODIATRY CLINIC | Facility: CLINIC | Age: 36
End: 2021-09-20
Payer: MEDICARE

## 2021-09-20 DIAGNOSIS — Z98.890 STATUS POST FOOT SURGERY: Primary | ICD-10-CM

## 2021-09-20 DIAGNOSIS — E11.42 DIABETIC PERIPHERAL NEUROPATHY (HCC): ICD-10-CM

## 2021-09-20 PROCEDURE — 99024 POSTOP FOLLOW-UP VISIT: CPT | Performed by: PODIATRIST

## 2021-09-20 NOTE — PROGRESS NOTES
Monica Sharma is a 43year old male. Patient presents with:  Post-Op: 2nd post op visit right 5th toe amputation. denies pain in office today. HPI:   Patient is now a week and half status post surgery is doing well overall.   A second postop visi Date   • Diabetes [facility])    • Dialysis patient [facility])     serina [address] M-W-F   • High blood pressure    • High cholesterol    • History of blood transfusion     10/2019   • Neuropathy    • Renal disorder       Past Surgical History:   Procedure

## 2021-09-29 ENCOUNTER — OFFICE VISIT (OUTPATIENT)
Dept: PODIATRY CLINIC | Facility: CLINIC | Age: 36
End: 2021-09-29
Payer: MEDICARE

## 2021-09-29 DIAGNOSIS — E11.42 DIABETIC PERIPHERAL NEUROPATHY (HCC): ICD-10-CM

## 2021-09-29 DIAGNOSIS — Z98.890 STATUS POST FOOT SURGERY: Primary | ICD-10-CM

## 2021-09-29 PROCEDURE — 99024 POSTOP FOLLOW-UP VISIT: CPT | Performed by: STUDENT IN AN ORGANIZED HEALTH CARE EDUCATION/TRAINING PROGRAM

## 2021-09-29 NOTE — PATIENT INSTRUCTIONS
-Leave dressings clean, dry, intact to right foot.  -Wear surgical shoe at all times with ambulation.  -Seek immediate medical attention if any acute signs of infection arise.  -Follow-up with Dr. Alicia Teague in 1 week.

## 2021-09-29 NOTE — PROGRESS NOTES
[facility] Podiatry  Progress Note    Vernell Goldstein is a 43year old male. Patient presents with:  Post-Op: Right foot 5th digit amputation. Patient denies any pain fever or chills.         HPI:     Patient is a 49-year-old diabetic male who prese Date   • Diabetes [facility])    • Dialysis patient [facility])     serina [address] M-W-F   • High blood pressure    • High cholesterol    • History of blood transfusion     10/2019   • Neuropathy    • Renal disorder       Past Surgical History:   Procedure neuropathy ([facility]        Plan:   -Patient examined, chart history reviewed. -Inspected surgical site--site is healing with mild dehiscence noted. No acute signs of infection noted.   -Site dressed with Betadine and dry dressing.  -Patient should continue a

## 2021-10-07 ENCOUNTER — OFFICE VISIT (OUTPATIENT)
Dept: PODIATRY CLINIC | Facility: CLINIC | Age: 36
End: 2021-10-07
Payer: MEDICARE

## 2021-10-07 DIAGNOSIS — Z98.890 STATUS POST FOOT SURGERY: Primary | ICD-10-CM

## 2021-10-07 DIAGNOSIS — N18.6 ESRD ON HEMODIALYSIS (HCC): ICD-10-CM

## 2021-10-07 DIAGNOSIS — Z99.2 ESRD ON HEMODIALYSIS (HCC): ICD-10-CM

## 2021-10-07 PROCEDURE — 99024 POSTOP FOLLOW-UP VISIT: CPT | Performed by: PODIATRIST

## 2021-10-10 NOTE — PROGRESS NOTES
Genie Mueller is a 43year old male. Patient presents with:  Post-Op: Right foot 5th digit amputation, pt denies any pain today in office. HPI:   Patient now 4 weeks postop presents for standard postoperative wound inspection.   No complaints of transfusion     10/2019   • Neuropathy    • Renal disorder       Past Surgical History:   Procedure Laterality Date   • AMPUTATION METATARSAL+TOE,SINGLE     • AV FISTULA REVISION, OPEN     • CATARACT     • OTHER      amputation of R great toe      History

## 2021-10-14 ENCOUNTER — OFFICE VISIT (OUTPATIENT)
Dept: PODIATRY CLINIC | Facility: CLINIC | Age: 36
End: 2021-10-14
Payer: MEDICARE

## 2021-10-14 DIAGNOSIS — Z98.890 STATUS POST FOOT SURGERY: Primary | ICD-10-CM

## 2021-10-14 PROCEDURE — 99024 POSTOP FOLLOW-UP VISIT: CPT | Performed by: PODIATRIST

## 2021-10-14 NOTE — PROGRESS NOTES
Daphnie French is a 43year old male. Patient presents with:  Post-Op: Right foot 5digit, surgery was done 9/8/21. pt denies any redness swelling, pain.          HPI:   This gentleman returns to the clinic he is now about 5 weeks status post surgery is cholesterol    • History of blood transfusion     10/2019   • Neuropathy    • Renal disorder       Past Surgical History:   Procedure Laterality Date   • AMPUTATION METATARSAL+TOE,SINGLE     • AV FISTULA REVISION, OPEN     • CATARACT     • OTHER      amput Belen Jefferson DPM

## 2021-10-21 ENCOUNTER — OFFICE VISIT (OUTPATIENT)
Dept: PODIATRY CLINIC | Facility: CLINIC | Age: 36
End: 2021-10-21
Payer: MEDICARE

## 2021-10-21 DIAGNOSIS — Z98.890 STATUS POST FOOT SURGERY: Primary | ICD-10-CM

## 2021-10-21 PROCEDURE — 99024 POSTOP FOLLOW-UP VISIT: CPT | Performed by: PODIATRIST

## 2021-10-21 NOTE — PROGRESS NOTES
Donya Lim is a 43year old male. Patient presents with:  Post-Op: right foot sx done 9/8/21. denies any fevers/chills, pain, drainage.          HPI:   This pleasant patient presents to the clinic again for a checkup on his healing surgeries now abo pressure    • High cholesterol    • History of blood transfusion     10/2019   • Neuropathy    • Renal disorder       Past Surgical History:   Procedure Laterality Date   • AMPUTATION METATARSAL+TOE,SINGLE     • AV FISTULA REVISION, OPEN     • CATARACT understanding of these issues and agrees to the plan.     Vamsi Betancourt DPM

## 2021-10-28 ENCOUNTER — OFFICE VISIT (OUTPATIENT)
Dept: PODIATRY CLINIC | Facility: CLINIC | Age: 36
End: 2021-10-28
Payer: MEDICARE

## 2021-10-28 ENCOUNTER — HOSPITAL ENCOUNTER (INPATIENT)
Facility: HOSPITAL | Age: 36
LOS: 6 days | Discharge: HOME HEALTH | DRG: 638 | End: 2021-11-03
Attending: STUDENT IN AN ORGANIZED HEALTH CARE EDUCATION/TRAINING PROGRAM | Admitting: HOSPITALIST
Payer: MEDICARE

## 2021-10-28 DIAGNOSIS — N18.6 ESRD ON HEMODIALYSIS (HCC): ICD-10-CM

## 2021-10-28 DIAGNOSIS — L03.119 CELLULITIS OF FOOT: Primary | ICD-10-CM

## 2021-10-28 DIAGNOSIS — L08.9 DIABETIC FOOT INFECTION (HCC): ICD-10-CM

## 2021-10-28 DIAGNOSIS — L03.115 CELLULITIS OF RIGHT FOOT: Primary | ICD-10-CM

## 2021-10-28 DIAGNOSIS — Z98.890 STATUS POST FOOT SURGERY: ICD-10-CM

## 2021-10-28 DIAGNOSIS — Z99.2 ESRD ON HEMODIALYSIS (HCC): ICD-10-CM

## 2021-10-28 DIAGNOSIS — E11.628 DIABETIC FOOT INFECTION (HCC): ICD-10-CM

## 2021-10-28 DIAGNOSIS — I10 PRIMARY HYPERTENSION: ICD-10-CM

## 2021-10-28 DIAGNOSIS — E11.42 DIABETIC PERIPHERAL NEUROPATHY (HCC): ICD-10-CM

## 2021-10-28 PROCEDURE — 99024 POSTOP FOLLOW-UP VISIT: CPT | Performed by: PODIATRIST

## 2021-10-28 PROCEDURE — 99223 1ST HOSP IP/OBS HIGH 75: CPT | Performed by: STUDENT IN AN ORGANIZED HEALTH CARE EDUCATION/TRAINING PROGRAM

## 2021-10-28 RX ORDER — CINACALCET 30 MG/1
30 TABLET, FILM COATED ORAL
Status: DISCONTINUED | OUTPATIENT
Start: 2021-10-29 | End: 2021-11-03

## 2021-10-28 RX ORDER — METOPROLOL SUCCINATE 25 MG/1
25 TABLET, EXTENDED RELEASE ORAL
Status: DISCONTINUED | OUTPATIENT
Start: 2021-10-29 | End: 2021-11-03

## 2021-10-28 RX ORDER — HEPARIN SODIUM 5000 [USP'U]/ML
5000 INJECTION, SOLUTION INTRAVENOUS; SUBCUTANEOUS EVERY 8 HOURS SCHEDULED
Status: DISCONTINUED | OUTPATIENT
Start: 2021-10-28 | End: 2021-11-03

## 2021-10-28 RX ORDER — SEVELAMER CARBONATE 800 MG/1
800 TABLET, FILM COATED ORAL
Status: DISCONTINUED | OUTPATIENT
Start: 2021-10-29 | End: 2021-11-03

## 2021-10-28 RX ORDER — ACETAMINOPHEN 500 MG
1000 TABLET ORAL ONCE
Status: COMPLETED | OUTPATIENT
Start: 2021-10-28 | End: 2021-10-28

## 2021-10-28 RX ORDER — CALCIUM ACETATE 667 MG/1
2 CAPSULE ORAL 3 TIMES DAILY
Status: DISCONTINUED | OUTPATIENT
Start: 2021-10-28 | End: 2021-11-03

## 2021-10-28 RX ORDER — AMLODIPINE BESYLATE 5 MG/1
10 TABLET ORAL DAILY
Status: DISCONTINUED | OUTPATIENT
Start: 2021-10-29 | End: 2021-11-03

## 2021-10-28 RX ORDER — ACETAMINOPHEN 325 MG/1
650 TABLET ORAL EVERY 6 HOURS PRN
Status: DISCONTINUED | OUTPATIENT
Start: 2021-10-28 | End: 2021-11-03

## 2021-10-28 RX ORDER — DEXTROSE MONOHYDRATE 25 G/50ML
50 INJECTION, SOLUTION INTRAVENOUS
Status: DISCONTINUED | OUTPATIENT
Start: 2021-10-28 | End: 2021-11-03

## 2021-10-28 RX ORDER — VANCOMYCIN 2 GRAM/500 ML IN 0.9 % SODIUM CHLORIDE INTRAVENOUS
25 ONCE
Status: COMPLETED | OUTPATIENT
Start: 2021-10-28 | End: 2021-10-28

## 2021-10-28 RX ORDER — POLYETHYLENE GLYCOL 3350 17 G/17G
17 POWDER, FOR SOLUTION ORAL DAILY PRN
Status: DISCONTINUED | OUTPATIENT
Start: 2021-10-28 | End: 2021-11-03

## 2021-10-28 RX ORDER — PROCHLORPERAZINE EDISYLATE 5 MG/ML
5 INJECTION INTRAMUSCULAR; INTRAVENOUS EVERY 8 HOURS PRN
Status: DISCONTINUED | OUTPATIENT
Start: 2021-10-28 | End: 2021-11-03

## 2021-10-28 RX ORDER — HYDROCODONE BITARTRATE AND ACETAMINOPHEN 5; 325 MG/1; MG/1
1 TABLET ORAL EVERY 6 HOURS PRN
Status: DISCONTINUED | OUTPATIENT
Start: 2021-10-28 | End: 2021-11-03

## 2021-10-28 RX ORDER — ATORVASTATIN CALCIUM 40 MG/1
40 TABLET, FILM COATED ORAL NIGHTLY
Status: DISCONTINUED | OUTPATIENT
Start: 2021-10-28 | End: 2021-11-03

## 2021-10-28 RX ORDER — BISACODYL 10 MG
10 SUPPOSITORY, RECTAL RECTAL
Status: DISCONTINUED | OUTPATIENT
Start: 2021-10-28 | End: 2021-11-03

## 2021-10-28 RX ORDER — AMLODIPINE BESYLATE 10 MG/1
10 TABLET ORAL DAILY
COMMUNITY

## 2021-10-28 RX ORDER — ONDANSETRON 2 MG/ML
4 INJECTION INTRAMUSCULAR; INTRAVENOUS EVERY 6 HOURS PRN
Status: DISCONTINUED | OUTPATIENT
Start: 2021-10-28 | End: 2021-11-03

## 2021-10-28 RX ORDER — LISINOPRIL 40 MG/1
40 TABLET ORAL DAILY
Status: DISCONTINUED | OUTPATIENT
Start: 2021-10-29 | End: 2021-11-03

## 2021-10-28 NOTE — PROGRESS NOTES
Cher Genao is a 43year old male. Patient presents with:  Post-Op: right foot f/u sx was done 9/8/21, pt states having a fever last night denies any drainage no pain.         HPI:   Patient returns the clinic he is not having any pain but said that blood pressure    • High cholesterol    • History of blood transfusion     10/2019   • Neuropathy    • Renal disorder       Past Surgical History:   Procedure Laterality Date   • AMPUTATION METATARSAL+TOE,SINGLE     • AV FISTULA REVISION, OPEN     • CATARA metatarsal.    The patient indicates understanding of these issues and agrees to the plan.     Michelet Isabel DPM

## 2021-10-28 NOTE — CONSULTS
INFECTIOUS DISEASE [facility] Patient Status:  Emergency    1985 MRN [id number]   Location [address] Attending Liliam Turner MD   [facility] Day # 0 Disp: , Rfl:   lisinopril 40 MG Oral Tab, Take 40 mg by mouth daily. , Disp: , Rfl:   Metoprolol Succinate ER 25 MG Oral Tablet 24 Hr, Take 1 tablet (25 mg total) by mouth daily. , Disp: 30 tablet, Rfl: 11  atorvastatin 40 MG Oral Tab, Take 40 mg by mouth ni Leukocytosis     Acute renal failure (ARF) (HCC)     Acute kidney injury ([address]     Metabolic alkalosis     Hyperglycemia     Osteomyelitis (HCC)     Essential hypertension     Hypertensive urgency     Fever     Arteriovenous fistula occlusion ([facility] [address] [facility]     Taylor Nassar

## 2021-10-28 NOTE — ED INITIAL ASSESSMENT (HPI)
cellulities in right foot with fever since 3 days . known case of kidney problem on dialysis last dialysis was yesterday.

## 2021-10-28 NOTE — H&P
[facility]ITAL[facility]  History and Physical     Edwena Lower Patient Status:  Emergency    1985 MRN [id number]   Location [address] Attending Jori Celeste MD   [facility] Day # 0 PCP Ardath Schwab     Chief Complaint: Bianka Paulson lisinopril 30 MG Oral Tab, Take 30 mg by mouth daily. , Disp: , Rfl:   Cinacalcet HCl (SENSIPAR OR), Take 30 mg by mouth., Disp: , Rfl:   mupirocin 2 % External Ointment, Plan a small amount to the opening on the top of the left foot and cover with a larg affect.       Diagnostic Data:      Labs:  Recent Labs   Lab 10/28/21  1440   WBC 14.3*   HGB 7.9*   MCV 91.6   .0       Recent Labs   Lab 10/28/21  1440   *   BUN 53*   CREATSERUM 10.60*   GFRAA 6*   GFRNAA 5*   CA 8.6   ALB 2.8*   *

## 2021-10-28 NOTE — ED PROVIDER NOTES
Patient Seen in: [facility] Emergency Department      History   Patient presents with:  Cellulitis  Fever    Stated Complaint: diabetic with foot infection in right foot    Subjective:   HPI    Patient is a 33-year-old male with previous history of BMI 26.45 kg/m²         Physical Exam    Constitutional: oriented to person, place, and time, appears well-developed and well-nourished. HENT:   Head: Normocephalic and atraumatic. Eyes: No scleral icterus. Neck: Normal range of motion. Neck supple. -----------         ------                     CBC W/ DIFFERENTIAL[[id number]]          Abnormal            Final result                 Please view results for these tests on the individual orders.    [place]

## 2021-10-29 ENCOUNTER — APPOINTMENT (OUTPATIENT)
Dept: CV DIAGNOSTICS | Facility: HOSPITAL | Age: 36
DRG: 638 | End: 2021-10-29
Attending: INTERNAL MEDICINE
Payer: MEDICARE

## 2021-10-29 PROCEDURE — 99232 SBSQ HOSP IP/OBS MODERATE 35: CPT | Performed by: STUDENT IN AN ORGANIZED HEALTH CARE EDUCATION/TRAINING PROGRAM

## 2021-10-29 PROCEDURE — 93306 TTE W/DOPPLER COMPLETE: CPT | Performed by: INTERNAL MEDICINE

## 2021-10-29 PROCEDURE — 99222 1ST HOSP IP/OBS MODERATE 55: CPT | Performed by: INTERNAL MEDICINE

## 2021-10-29 NOTE — CONSULTS
[facility]  Report of Consultation    Monica Cancel Patient Status:  Inpatient    1985 MRN [id number]   [facility] 3NE-A Attending Janette Mcallister MD   Hosp Day # 1 PCP Fabrice Nicole     Reason for Consultation:  ESRD    History 5,000 Units, Subcutaneous, Q8H [facility]  •  acetaminophen (TYLENOL) tab 650 mg, 650 mg, Oral, Q6H PRN  •  ondansetron (ZOFRAN) injection 4 mg, 4 mg, Intravenous, Q6H PRN  •  Prochlorperazine Edisylate (COMPAZINE) injection 5 mg, 5 mg, Intravenous, Q8H PRN  •  p Intake/Output Summary (Last 24 hours) at 10/29/2021 1254  Last data filed at 10/28/2021 1900  Gross per 24 hour   Intake 740 ml   Output —   Net 740 ml     Last 3 Weights  10/29/21 0600 : 201 lb (91.2 kg)  10/28/21 2026 : 201 lb 11.5 oz (91.5 kg)  10/2 10.60* 12.30*   CA 8.6 9.3   * 93       Recent Labs   Lab 10/28/21  1440   ALT 30   AST 35   ALB 2.8*       Recent Labs   Lab 10/28/21  2029 10/28/21  2346 10/29/21  0601 10/29/21  0926   PGLU 250* 184* 100* 103*           Impression/Plan:    #1.   E

## 2021-10-29 NOTE — PROGRESS NOTES
[facility]     Hospitalist Progress Note     Luigi Alaniz Patient Status:  Inpatient    1985 MRN [id number]   [facility] 3NE-A Attending Stephon Rhodes MD   [facility] Day # 1 PCP Meme Jolley     Chief Complaint: Foto pain, drainag CRP, ANEESH, LDH, DDIMER in the last 168 hours. Imaging: Imaging data reviewed in Epic.     Medications:   • atorvastatin  40 mg Oral Nightly   • amLODIPine  10 mg Oral Daily   • calcium acetate  2 capsule Oral TID   • cinacalcet  30 mg Oral Daily with jareth require TBD.

## 2021-10-29 NOTE — PLAN OF CARE
Patient is alert, oriented x4. Vitals stable, afebrile. No c/o pain. Tolerating diet, no nausea/vomiting/diarrhea. Continent of bowel and bladder . Voids to bathroom. Up ad leslie. Calls appropriately. Safety and fall precautions in place.  Call light within r

## 2021-10-29 NOTE — PROGRESS NOTES
[facility]                INFECTIOUS DISEASE PROGRESS NOTE    Luigi Alaniz Patient Status:  Inpatient    1985 MRN [id number]   [facility] 3NE-A Attending Stephon Rhodes MD   [address] Day # 1 PCP Meme Jolley     Antibiotics: Encounter on 10/28/21   1.  Aerobic Bacterial Culture     Status: Abnormal (Preliminary result)    Collection Time: 10/28/21  4:06 PM    Specimen: Foot, right; Other   Result Value Ref Range    Aerobic Culture Result Staphylococcus aureus (A) N/A    Aerobic

## 2021-10-29 NOTE — PROGRESS NOTES
[facility] Dosing Service    Initial Pharmacokinetic Consult for Vancomycin Dosing     Annika Duval is a 43year old patient who is being treated for osteomyelitis.   Pharmacy has been asked to dose Vancomycin by      Weights:  Ideal body w

## 2021-10-29 NOTE — PLAN OF CARE
Pt oriented x 4  Admitted with cellulitis rt toe wound   Wound is dry with no drainage noted   On cheikh antibiotics   Denies pain   Vitals stable   Will continue to monitor pt     Problem: Diabetes/Glucose Control  Goal: Glucose maintained within prescribed

## 2021-10-30 PROCEDURE — 5A1D70Z PERFORMANCE OF URINARY FILTRATION, INTERMITTENT, LESS THAN 6 HOURS PER DAY: ICD-10-PCS | Performed by: INTERNAL MEDICINE

## 2021-10-30 PROCEDURE — 99232 SBSQ HOSP IP/OBS MODERATE 35: CPT | Performed by: STUDENT IN AN ORGANIZED HEALTH CARE EDUCATION/TRAINING PROGRAM

## 2021-10-30 PROCEDURE — 90935 HEMODIALYSIS ONE EVALUATION: CPT | Performed by: INTERNAL MEDICINE

## 2021-10-30 NOTE — PROGRESS NOTES
[facility]     Hospitalist Progress Note     Lattie Gist Patient Status:  Inpatient    1985 MRN [id number]   [facility] 3NE-A Attending Taryn Wilson MD   [facility] Day # 2 PCP Dalton Leiva     Chief Complaint: Foot pain, drainag ANEESH, LDH, DDIMER in the last 168 hours. Imaging: Imaging data reviewed in Epic.     Medications:   • atorvastatin  40 mg Oral Nightly   • amLODIPine  10 mg Oral Daily   • calcium acetate  2 capsule Oral TID   • cinacalcet  30 mg Oral Daily with breakfast

## 2021-10-30 NOTE — PROGRESS NOTES
[facility]  Progress Note    Rakesh Platt Patient Status:  Inpatient    1985 MRN [id number]   [facility] 3NE-A Attending Dianna Mcbride MD   Hosp Day # 2 PCP Carmen Steward       No acute events  HD today    atorvastatin (Pacheco Fernandez Instructions  epoetin sophia-epbx (RETACRIT) 74324 UNIT/ML injection 10,000 Units, 10,000 Units, Intravenous, Once in dialysis          Physical Exam:   /81   Pulse 75   Temp 98.2 °F (36.8 °C) (Oral)   Resp 16   Ht 6' (1.829 m)   Wt 201 lb (91.2 kg) metoprolol    Thank you for allowing me to participate in the care of your patient. Please do not hesitate to call with any questions or concerns.        Ivy Cogan  10/30/2021

## 2021-10-30 NOTE — PLAN OF CARE
Assumed care of pt at 0730. Pt is A&O x4. Contact isolation precautions maintained. Legally blind. Dialysis today, 2L removed. Vancomycin given after dialysis per order. Dressing changed on right foot. Insulin given per MAR.   Pt. Denies having any p

## 2021-10-30 NOTE — PROGRESS NOTES
[facility]                INFECTIOUS DISEASE PROGRESS NOTE    Murguia Evert Patient Status:  Inpatient    1985 MRN [id number]   [facility] 3NE-A Attending Mckayla Shell MD   [address] Day # 2 PCP Aldandrews Liao     Antibiotics: * 134*   K 3.9 4.4   CL 94* 95*   CO2 30.0 29.0   ALKPHO 79  --    AST 35  --    ALT 30  --    BILT 0.4  --    TP 7.5  --        Vancomycin Trough (ug/mL)   Date Value   11/16/2020 20.0     Microbiology    Hospital Encounter on 10/28/21   1.  Blaire Rios unspecified type ([facility]     Primary hypertension     Cellulitis of right foot     Diabetic ulcer of right foot associated with type 2 diabetes mellitus ([facility]     ESRD on hemodialysis ([facility]     MRSA infection      ASSESSMENT/PLAN:  1.  MRSA bacteremia/Right f

## 2021-10-31 PROCEDURE — 99232 SBSQ HOSP IP/OBS MODERATE 35: CPT | Performed by: STUDENT IN AN ORGANIZED HEALTH CARE EDUCATION/TRAINING PROGRAM

## 2021-10-31 PROCEDURE — 99232 SBSQ HOSP IP/OBS MODERATE 35: CPT | Performed by: INTERNAL MEDICINE

## 2021-10-31 NOTE — PLAN OF CARE
Alert and oriented,V/S stable,tele,rhythm stable. ,room air. Refusing SCD'S. Appears tired,had dialysis yesterday,rt arm precaution,rt. AVF. Rt.foot dressing clean and dry. Denies need for pain or  discomfort. Will  continue to monitor. SAFETY  measures     Matthew

## 2021-10-31 NOTE — PROGRESS NOTES
[facility]  Progress Note    Cheri Push Patient Status:  Inpatient    1985 MRN [id number]   [facility] 3NE-A Attending Nathalie Smith MD   Hosp Day # 3 PCP Kaylin Vela       No acute events       atorvastatin (LIPITOR) tab Instructions  epoetin sophia-epbx (RETACRIT) 22161 UNIT/ML injection 10,000 Units, 10,000 Units, Intravenous, Once in dialysis          Physical Exam:   /76 (BP Location: Left arm)   Pulse 79   Temp 98.6 °F (37 °C) (Oral)   Resp 18   Ht 6' (1.829 m) participate in the care of your patient. Please do not hesitate to call with any questions or concerns.        Stephanie Esqueda  10/31/2021

## 2021-10-31 NOTE — PROGRESS NOTES
[facility]     Hospitalist Progress Note     Murguia Brood Patient Status:  Inpatient    1985 MRN [id number]   [facility] 3NE-A Attending Mckayla Shell MD   Hosp Day # 3 PCP Bettina Liao     Chief Complaint: Foot pain, drainag for input(s): CK in the last 168 hours. Inflammatory Markers  No results for input(s): CRP, ANEESH, LDH, DDIMER in the last 168 hours. Imaging: Imaging data reviewed in Epic.     Medications:   • [START ON 11/1/2021] epoetin sophia-epbx  10,000 Units Intra

## 2021-10-31 NOTE — PLAN OF CARE
Pt. A&Ox4  RA;VSS  Tele- NSR  Denies pain   Up independently  Legally blind  R AV Fistula- R arm precaution   PIV SL   QID accucheck  Contact ISO maintained for MRSA in foot   Staff will continue to monitor       Problem: Diabetes/Glucose Control  Goal: Gl

## 2021-11-01 PROCEDURE — 99232 SBSQ HOSP IP/OBS MODERATE 35: CPT | Performed by: INTERNAL MEDICINE

## 2021-11-01 NOTE — PROGRESS NOTES
[facility]                INFECTIOUS DISEASE PROGRESS NOTE    Christine Hess Patient Status:  Inpatient    1985 MRN [id number]   [facility] 3NE-A Attending Martha Gastelum MD   [facility] Day # 4 PCP Lewis Mendez     Antibiotics: --   --    * 134* 138   K 3.9 4.4 4.4   CL 94* 95* 101   CO2 30.0 29.0 29.0   ALKPHO 79  --   --    AST 35  --   --    ALT 30  --   --    BILT 0.4  --   --    TP 7.5  --   --        Vancomycin Trough (ug/mL)   Date Value   11/16/2020 20.0     Microbi methicillin resistant Staphylococcus aureus     Osteomyelitis, unspecified site, unspecified type ([facility]     Primary hypertension     Cellulitis of right foot     Diabetic ulcer of right foot associated with type 2 diabetes mellitus ([facility]     ESRD on hemodi

## 2021-11-01 NOTE — PROGRESS NOTES
[facility]     Nephrology Progress Note    James Bonds Patient Status:  Inpatient    1985 MRN [id number]   [facility] 3NE-A Attending Aracelis Mckeon MD   [facility] Day # 4 PCP Tracy Gore       SUBJECTIVE:  Patient is stable this 10/31/21  1744 10/31/21  2101 11/01/21  0631   PGLU 95 130* 157* 219* 104*       Meds:   epoetin sophia-epbx (RETACRIT) 36359 UNIT/ML injection 10,000 Units, 10,000 Units, Intravenous, Once in dialysis  atorvastatin (LIPITOR) tab 40 mg, 40 mg, Oral, Nightly (RETACRIT) 10658 UNIT/ML injection 10,000 Units, 10,000 Units, Intravenous, Once in dialysis          Impression/Plan:    #1. ESRD-due to diabetes. Continue dialysis Monday/Wednesday/Friday per usual routine    #2. Anemia-due to ESRD.   Continue BASSAM for

## 2021-11-01 NOTE — PLAN OF CARE
A&Ox4. On room air, lungs clear. Abdomen soft and nontender, denies N&V. Denies pain or discomfort. Dressing to right foot C/D/I. Right arm precautions maintained d/t Right AV fistula- +bruit/+thrill. Left forearm PIV saline locked.  Plan is for patient to

## 2021-11-01 NOTE — PLAN OF CARE
Assumed pt care at 0730. A/o X4. Rm air. NSR on tele. Voids in bathroom, ambulating independently. Denies any pain. R arm precaution d/t R av fistula. Dialysis today at 1230, with IV vanco given after dialysis M/W/F. Renal diet, QID accu.  L FA IV, c/d/i, S reports new pain  - Anticipate increased pain with activity and pre-medicate as appropriate  Outcome: Progressing     Problem: RISK FOR INFECTION - ADULT  Goal: Absence of fever/infection during anticipated neutropenic period  Description: INTERVENTIONS  -

## 2021-11-01 NOTE — PROGRESS NOTES
[facility]     Hospitalist Progress Note     Kamila Broody Patient Status:  Inpatient    1985 MRN [id number]   [facility] 3NE-A Attending Albina Carver MD   [facility] Day # 4 PCP Karl Balderrama     Chief Complaint: Foot pain, drainag last 168 hours. Inflammatory Markers  No results for input(s): CRP, ANEESH, LDH, DDIMER in the last 168 hours. Imaging: Imaging data reviewed in Epic.     Medications:   • epoetin sophia-epbx  10,000 Units Intravenous Once in dialysis   • atorvastatin  40

## 2021-11-02 ENCOUNTER — APPOINTMENT (OUTPATIENT)
Dept: MRI IMAGING | Facility: HOSPITAL | Age: 36
DRG: 638 | End: 2021-11-02
Attending: PODIATRIST
Payer: MEDICARE

## 2021-11-02 PROCEDURE — 99232 SBSQ HOSP IP/OBS MODERATE 35: CPT | Performed by: INTERNAL MEDICINE

## 2021-11-02 PROCEDURE — 73718 MRI LOWER EXTREMITY W/O DYE: CPT | Performed by: PODIATRIST

## 2021-11-02 PROCEDURE — 99232 SBSQ HOSP IP/OBS MODERATE 35: CPT | Performed by: STUDENT IN AN ORGANIZED HEALTH CARE EDUCATION/TRAINING PROGRAM

## 2021-11-02 NOTE — CONSULTS
[facility]    Report of Consultation    Donnaarmond Contreras Patient Status:  Inpatient    1985 MRN [id number]   [facility] 3NE-A Attending Laurita Ruiz MD   [address] Day # 5 PCP Ana Santana     Date of Admission:  10/28/2021  Date of C Units, Subcutaneous, Nightly  •  lisinopril tab 40 mg, 40 mg, Oral, Daily  •  metoprolol succinate (Toprol XL) 24 hr tab 25 mg, 25 mg, Oral, Daily Beta Blocker  •  sevelamer (RENVELA) tab 800 mg, 800 mg, Oral, TID CC  •  Heparin Sodium (Porcine) 5000 UNIT/ 10/29/21  0557   RBC 2.84*   HGB 8.3*   HCT 26.1*   MCV 91.9   MCH 29.2   MCHC 31.8   RDW 13.9   NEPRELIM 8.20*   WBC 10.4   .0       Impression and Plan:  Patient Active Problem List:     Back pain     Left leg weakness     Skin ulcer of right foot

## 2021-11-02 NOTE — PROGRESS NOTES
[facility]     Hospitalist Progress Note     Cheri Push Patient Status:  Inpatient    1985 MRN [id number]   [facility] 3NE-A Attending Nathalie Smith MD   Hosp Day # 5 PCP Kaylin Vela     Chief Complaint: Foot pain, drainag the last 168 hours. Inflammatory Markers  No results for input(s): CRP, ANEESH, LDH, DDIMER in the last 168 hours. Imaging: Imaging data reviewed in Epic.     Medications:   • [START ON 11/3/2021] epoetin sophia-epbx  10,000 Units Intravenous Once in dialy

## 2021-11-02 NOTE — PROGRESS NOTES
[facility]     Nephrology Progress Note    Donna Contreras Patient Status:  Inpatient    1985 MRN [id number]   [facility] 3NE-A Attending Laurita Ruiz MD   [address] Day # 5 PCP Ana Santana       SUBJECTIVE:  No acute complains; wan chloride 0.9% 250 mL IVPB-ADDV, 10 mg/kg, Intravenous, See Admin Instructions          Physical Exam:   /67   Pulse 73   Temp 98 °F (36.7 °C) (Oral)   Resp 18   Ht 6' (1.829 m)   Wt 201 lb (91.2 kg)   SpO2 95%   BMI 27.26 kg/m²   Temp (24hrs), Av vancomycin per ID. Ongoing multispecialty care    4. Hypertension-blood pressure stable. Continue amlodipine, lisinopril and metoprolol    Will order HD for tomorrow       Questions/concerns were discussed with patient and/or family by bedside.       Lionel

## 2021-11-02 NOTE — PLAN OF CARE
Assumed pt care at 0730. A/O x4, legally blind. R AV fistula thrill and bruit, R arm precaution. Rm air. NSR on tele. Voids, ambulating independently. Denies any pain. Gladyso m/w/f after dialysis. Heparin. Renal diet, QID accu.  R foot wound covered with ke side effects  - Notify MD/LIP if interventions unsuccessful or patient reports new pain  - Anticipate increased pain with activity and pre-medicate as appropriate  Outcome: Progressing     Problem: RISK FOR INFECTION - ADULT  Goal: Absence of fever/infecti

## 2021-11-02 NOTE — PLAN OF CARE
Assumed care at 1. Pt receiving dialysis at change of shift. Finished around 2030, removed 2200 mL. Received vancomycin after dialysis per orders. JULES fistula, bruit and thrill present. Right arm precautions. M,W,F schedule. Pt is A&Ox4. On RA, .  NSR

## 2021-11-02 NOTE — PROGRESS NOTES
[facility]                INFECTIOUS DISEASE PROGRESS NOTE    Alvino Hawkins Patient Status:  Inpatient    1985 MRN [id number]   [facility] 3NE-A Attending Giuseppe Guerin MD   [facility] Day # 5 PCP Burnis [place]     Antibiotics: (ug/mL)   Date Value   11/16/2020 20.0     Microbiology    Hospital Encounter on 10/28/21   1.  Blood Culture     Status: None (Preliminary result)    Collection Time: 10/29/21  2:43 PM    Specimen: Blood,peripheral   Result Value Ref Range    Blood Culture diabetes mellitus ([facility] [address]     [facility] hemod[facility]     MRSA infection            ASSESSMENT/PLAN:  1.  MRSA bacteremia/right foot infection  -previous right 5th toe disarticulation 9/8, culture MRSA  Recurrent drainage, fever, blood cultures and wound c

## 2021-11-03 VITALS
DIASTOLIC BLOOD PRESSURE: 79 MMHG | OXYGEN SATURATION: 96 % | TEMPERATURE: 98 F | HEART RATE: 74 BPM | WEIGHT: 201 LBS | HEIGHT: 72 IN | RESPIRATION RATE: 19 BRPM | BODY MASS INDEX: 27.22 KG/M2 | SYSTOLIC BLOOD PRESSURE: 140 MMHG

## 2021-11-03 PROCEDURE — 90935 HEMODIALYSIS ONE EVALUATION: CPT | Performed by: INTERNAL MEDICINE

## 2021-11-03 PROCEDURE — 99239 HOSP IP/OBS DSCHRG MGMT >30: CPT | Performed by: STUDENT IN AN ORGANIZED HEALTH CARE EDUCATION/TRAINING PROGRAM

## 2021-11-03 RX ORDER — VANCOMYCIN HYDROCHLORIDE 500 MG/10ML
500 INJECTION, POWDER, LYOPHILIZED, FOR SOLUTION INTRAVENOUS
Qty: 18 EACH | Refills: 0 | Status: SHIPPED | OUTPATIENT
Start: 2021-11-03

## 2021-11-03 NOTE — PROGRESS NOTES
[facility]     Hospitalist Progress Note     Kamila Broody Patient Status:  Inpatient    1985 MRN [id number]   [facility] 3NE-A Attending Albina Carver MD   [facility] Day # 6 PCP Karl Balderrama     Chief Complaint: Foot pain, drainag Not Detected 09/07/2021    COVID19 Not Detected 02/08/2021       Pro-Calcitonin  No results for input(s): PCT in the last 168 hours. Cardiac  No results for input(s): TROP, PBNP in the last 168 hours.     Creatinine Kinase  No results for input(s): CK in

## 2021-11-03 NOTE — PLAN OF CARE
NURSING DISCHARGE NOTE    Discharged Home via Wheelchair. Accompanied by Support staff  Belongings Taken by patient/family. Pt discharged in calm, stable status to home.  Confirmed with Dr. Amadeo Rivero that pt is cleared for discharge home, discharge instru

## 2021-11-03 NOTE — PROGRESS NOTES
[facility] dosing service    Follow-up Pharmacokinetic Consult for Vancomycin Dosing     Adrienne Mariano is a 43year old patient who is being treated for osteomyelitis.    Patient is on day 7 of Vancomycin and is currently receiving 1000 mg IV after d

## 2021-11-03 NOTE — PROGRESS NOTES
[facility]     Nephrology Progress Note    Jaya Jacobo Patient Status:  Inpatient    1985 MRN [id number]   [facility] 3NE-A Attending Viky Duncan MD   [facility] Day # 6 PCP Ranjit Carrrea       SUBJECTIVE:  No acute complains; HD 1-68 Units, Subcutaneous, TID CC  Vancomycin HCl (VANCOCIN) 1,000 mg in sodium chloride 0.9% 250 mL IVPB-ADDV, 10 mg/kg, Intravenous, See Admin Instructions          Physical Exam:   /79 (BP Location: Left arm)   Pulse 74   Temp 98.9 °F (37.2 °C) (Or infection-found to have MRSA. On vancomycin per ID. Ongoing multispecialty care    4. Hypertension-blood pressure stable. Continue amlodipine, lisinopril and metoprolol     Questions/concerns were discussed with patient and/or family by bedside.       IRASEMA - - -

## 2021-11-03 NOTE — PROGRESS NOTES
11/03/21  1220   BP:    Pulse:    Resp: 18   Temp: 98.6 °F (37 °C)   Patient was seen resting comfortably in bed at this time no complaints of fever or chills.   His vital signs have been stable his white blood cell count is stable at 10.4 he has not spike

## 2021-11-03 NOTE — PLAN OF CARE
Assumed patient care @1280  Patient is AOx4  On room air  NSR on tele, vss, afebrile  Patient continent of bowel and bladder, last BM 11/1  Patient denies pain, nausea, vomiting, shortness of breath  Up ad leslie  Renal diet-accucheck QID-no novolog needed du using appropriate pain scale  - Administer analgesics based on type and severity of pain and evaluate response  - Implement non-pharmacological measures as appropriate and evaluate response  - Consider cultural and social influences on pain and pain manage partner  - Complete POLST form as appropriate  - Assess patient's ability to be responsible for managing their own health  - Refer to Case Management Department for coordinating discharge planning if the patient needs post-hospital services based on physic

## 2021-11-03 NOTE — CM/SW NOTE
2:57pm  MSW attempted to see pt, he was a sleep to discuss Home Ivabex and HHC. MSW, Charge Rn and RN discussed patient's post d/c needs in care rounds. No identified needs at this time, MSW will remain available should needs change.     Plan of Care/B

## 2021-11-03 NOTE — PROGRESS NOTES
[facility]                INFECTIOUS DISEASE PROGRESS NOTE    Monica Cancel Patient Status:  Inpatient    1985 MRN [id number]   [facility] 3NE-A Attending Janette Mcallister MD   [facility] Day # 6 PCP Fabrice Nicole     Antibiotics: > 29.0 29.0 25.0   ALKPHO 79  --   --   --   --    AST 35  --   --   --   --    ALT 30  --   --   --   --    BILT 0.4  --   --   --   --    TP 7.5  --   --   --   --     < > = values in this interval not displayed.        Vancomycin Trough (ug/mL)   Date Va ([facility]     Bacteremia due to methicillin resistant Staphylococcus aureus     Osteomyelitis, unspecified site, unspecified type ([facility]     Primary hypertension     Cellulitis of right foot     Diabetic ulcer of right foot associated with type 2 diabetes dereck

## 2021-11-03 NOTE — CONSULTS
[facility]  Report of Inpatient Wound Care Consultation    Donya Lim Patient Status:  Inpatient    1985 MRN [id number]   [facility] 3NE-A Attending Ranulfo Zepeda MD   [facility] Day # 6 PCP Nuha Goyal     Reason for [facility] displayed.          ASSESSMENT:  Wound 10/28/21 Old surgical Foot Right;Other (Comment) (Active)   Date First Assessed/Time First Assessed: 10/28/21 2030   Present on Hospital Admission: Yes  Primary Wound Type: Old surgical  Location: Foot  Wound Location

## 2021-11-03 NOTE — PLAN OF CARE
Assumed pt care at 0730. A/O X4, legally blind. Rm air. NS on tele, afebrile. Continent w last BM 11/1. Ambulates independently. Denies any pain/nausea. [facility] giving HD, Vanco not given d/t high lvls. Plan on giving after HD Fri. Renal diet, qid accu. anxiety  - Utilize distraction and/or relaxation techniques  - Monitor for opioid side effects  - Notify MD/LIP if interventions unsuccessful or patient reports new pain  - Anticipate increased pain with activity and pre-medicate as appropriate  Outcome: P related to functional status, cognitive ability or social support system  Outcome: Progressing

## 2021-11-04 NOTE — CM/SW NOTE
At MN last night pt asked RN for [facility] RN. MSW sent referrals today, pending.  MSW will f/u

## 2021-11-04 NOTE — HOME CARE LIAISON
Received referral via [facility] for Home Health. Pt discharged from [facility] on 11/3. Spoke w/ pt via phone who is agreeable w/ [facility]. Notified BRIGIDA/Danelle.

## 2021-11-05 NOTE — DISCHARGE SUMMARY
[facility] HOSPITALIST  DISCHARGE SUMMARY     Olivia Farrell Patient Status:  Inpatient    1985 MRN [id number]   [facility] 3NE-A Attending Elly Hazel, DO   Hosp Day # 6 PCP Brittni José     Date of Admission: 10/28/2021  Date of Disch each  Refills: 0        CONTINUE taking these medications      Instructions Prescription details   amLODIPine 10 MG Tabs  Commonly known as: NORVASC      Take 10 mg by mouth daily.    Refills: 0     apixaban 2.5 MG Tabs  Commonly known as: ELIQUIS      Take None          Vital signs:       Physical Exam:    General: No acute distress. Respiratory: Clear to auscultation bilaterally. No wheezes. No rhonchi. Cardiovascular: S1, S2. Regular rate and rhythm. No murmurs, rubs or gallops.    Abdomen: Soft, nontend

## 2021-11-11 ENCOUNTER — OFFICE VISIT (OUTPATIENT)
Dept: PODIATRY CLINIC | Facility: CLINIC | Age: 36
End: 2021-11-11
Payer: MEDICARE

## 2021-11-11 ENCOUNTER — TELEPHONE (OUTPATIENT)
Dept: PODIATRY CLINIC | Facility: CLINIC | Age: 36
End: 2021-11-11

## 2021-11-11 DIAGNOSIS — Z99.2 ESRD ON HEMODIALYSIS (HCC): ICD-10-CM

## 2021-11-11 DIAGNOSIS — N18.6 ESRD ON HEMODIALYSIS (HCC): ICD-10-CM

## 2021-11-11 DIAGNOSIS — Z98.890 STATUS POST FOOT SURGERY: Primary | ICD-10-CM

## 2021-11-11 PROCEDURE — 99024 POSTOP FOLLOW-UP VISIT: CPT | Performed by: PODIATRIST

## 2021-11-15 NOTE — PROGRESS NOTES
Cheri Santo is a 43year old male. Patient presents with: Follow - Up: right foot sx 9/8. denies pain.          HPI:   Patient returns to the clinic in surgery on September 8 he was recently hospitalized with cellulitis he is now receiving his vanco • OTHER      amputation of R great toe      History reviewed. No pertinent family history.    Social History    Socioeconomic History      Marital status: Single    Tobacco Use      Smoking status: Never Smoker      Smokeless tobacco: Never Used    Vaping

## 2021-11-18 ENCOUNTER — OFFICE VISIT (OUTPATIENT)
Dept: PODIATRY CLINIC | Facility: CLINIC | Age: 36
End: 2021-11-18
Payer: MEDICARE

## 2021-11-18 DIAGNOSIS — Z99.2 ESRD ON HEMODIALYSIS (HCC): ICD-10-CM

## 2021-11-18 DIAGNOSIS — S91.301S OPEN WOUND OF RIGHT FOOT, SEQUELA: ICD-10-CM

## 2021-11-18 DIAGNOSIS — Z98.890 STATUS POST FOOT SURGERY: Primary | ICD-10-CM

## 2021-11-18 DIAGNOSIS — E11.42 DIABETIC PERIPHERAL NEUROPATHY (HCC): ICD-10-CM

## 2021-11-18 DIAGNOSIS — N18.6 ESRD ON HEMODIALYSIS (HCC): ICD-10-CM

## 2021-11-18 PROCEDURE — 99213 OFFICE O/P EST LOW 20 MIN: CPT | Performed by: PODIATRIST

## 2021-11-18 RX ORDER — VANCOMYCIN HYDROCHLORIDE 1 G/20ML
500 INJECTION, POWDER, LYOPHILIZED, FOR SOLUTION INTRAVENOUS
COMMUNITY
Start: 2021-11-05 | End: 2021-11-18

## 2021-11-18 NOTE — PROGRESS NOTES
Lilli Alegria is a 43year old male.  Patient presents with:  Post-Op: Sx 9/8, right foot, getting IV abx 3x/week, denies pain/fever/chills        HPI:   This pleasant patient returns to the clinic he has been getting IV antibiotics now for 3 weeks he amputation of R great toe      History reviewed. No pertinent family history.    Social History    Socioeconomic History      Marital status: Single    Tobacco Use      Smoking status: Never Smoker      Smokeless tobacco: Never Used    Vaping Use      Va No

## 2021-11-23 ENCOUNTER — OFFICE VISIT (OUTPATIENT)
Dept: PODIATRY CLINIC | Facility: CLINIC | Age: 36
End: 2021-11-23
Payer: MEDICARE

## 2021-11-23 DIAGNOSIS — Z98.890 STATUS POST FOOT SURGERY: Primary | ICD-10-CM

## 2021-11-23 PROCEDURE — 99024 POSTOP FOLLOW-UP VISIT: CPT | Performed by: PODIATRIST

## 2021-11-23 NOTE — PROGRESS NOTES
Bob Maya is a 43year old male. Patient presents with: Follow - Up: s/p right foot f/u, pt denies any pain and no c/o today.         HPI:   Patient presents status post right foot surgery dehiscence possible osteomyelitis fifth metatarsal head, r great toe      History reviewed. No pertinent family history.    Social History    Socioeconomic History      Marital status: Single    Tobacco Use      Smoking status: Never Smoker      Smokeless tobacco: Never Used    Vaping Use      Vaping Use: Never use

## 2021-11-30 ENCOUNTER — OFFICE VISIT (OUTPATIENT)
Dept: PODIATRY CLINIC | Facility: CLINIC | Age: 36
End: 2021-11-30
Payer: MEDICARE

## 2021-11-30 DIAGNOSIS — M86.9 OSTEOMYELITIS OF METATARSAL (HCC): ICD-10-CM

## 2021-11-30 DIAGNOSIS — Z98.890 STATUS POST FOOT SURGERY: Primary | ICD-10-CM

## 2021-11-30 PROCEDURE — 99024 POSTOP FOLLOW-UP VISIT: CPT | Performed by: PODIATRIST

## 2021-12-06 NOTE — PROGRESS NOTES
Luigi Alaniz is a 43year old male. Patient presents with:  Post-Op: right foot post op visit.         HPI:   Returns to the clinic he is now status post amputation of fifth toe x-rays taken last week also I informed the patient that they did show christoph Laterality Date   • AMPUTATION METATARSAL+TOE,SINGLE     • AV FISTULA REVISION, OPEN     • CATARACT     • OTHER      amputation of R great toe      History reviewed. No pertinent family history.    Social History    Socioeconomic History      Marital status in 2 weeks for checkup and he will continue with just a Band-Aid over to watch for drainage. The patient indicates understanding of these issues and agrees to the plan.     Toshia Scherer DPM

## 2021-12-07 NOTE — TELEPHONE ENCOUNTER
Dr. Willard Bro      Please sign off on form: Disability  -Highlight the patient and hit \"Chart\" button.   -In Chart Review, w/in the Encounter tab - click 1 time on the Telephone call encounter for 11/11/2021 Scroll down the telephone encounter.  -Click \"sc

## 2021-12-07 NOTE — TELEPHONE ENCOUNTER
Completed disability forms faxed to [employer] at [phone], confirmation received. Sent pt a Stem message.

## 2021-12-14 ENCOUNTER — OFFICE VISIT (OUTPATIENT)
Dept: PODIATRY CLINIC | Facility: CLINIC | Age: 36
End: 2021-12-14
Payer: MEDICARE

## 2021-12-14 DIAGNOSIS — Z98.890 STATUS POST FOOT SURGERY: Primary | ICD-10-CM

## 2021-12-14 PROCEDURE — 99024 POSTOP FOLLOW-UP VISIT: CPT | Performed by: PODIATRIST

## 2021-12-14 NOTE — PROGRESS NOTES
Lucille Zapata is a 43year old male. Patient presents with:  Post-Op: post right foot. denies pain. no swelling. HPI:   Patient returns the clinic he is now finished his IV antibiotics he notices no swelling no drainage no problems.   At today's History reviewed. No pertinent family history.    Social History    Socioeconomic History      Marital status: Single    Tobacco Use      Smoking status: Never Smoker      Smokeless tobacco: Never Used    Vaping Use      Vaping Use: Never used    [employer]

## 2021-12-28 ENCOUNTER — OFFICE VISIT (OUTPATIENT)
Dept: PODIATRY CLINIC | Facility: CLINIC | Age: 36
End: 2021-12-28
Payer: MEDICARE

## 2021-12-28 DIAGNOSIS — Z98.890 STATUS POST FOOT SURGERY: Primary | ICD-10-CM

## 2021-12-28 PROCEDURE — 99213 OFFICE O/P EST LOW 20 MIN: CPT | Performed by: PODIATRIST

## 2021-12-28 NOTE — PROGRESS NOTES
Kamila Santiago is a 43year old male. Patient presents with: Follow - Up: s/p right foot f/u, pt is doing well, no c/o no pain today.         HPI:   Patient was seen status post fifth met head resection hospitalization with IV antibiotics he has no com FISTULA REVISION, OPEN     • CATARACT     • OTHER      amputation of R great toe      History reviewed. No pertinent family history.    Social History    Socioeconomic History      Marital status: Single    Tobacco Use      Smoking status: Never Smoker

## 2022-02-08 ENCOUNTER — OFFICE VISIT (OUTPATIENT)
Dept: PODIATRY CLINIC | Facility: CLINIC | Age: 37
End: 2022-02-08
Payer: MEDICARE

## 2022-02-08 DIAGNOSIS — Z98.890 STATUS POST FOOT SURGERY: Primary | ICD-10-CM

## 2022-02-08 PROCEDURE — 99213 OFFICE O/P EST LOW 20 MIN: CPT | Performed by: PODIATRIST

## 2022-05-09 ENCOUNTER — OFFICE VISIT (OUTPATIENT)
Dept: PODIATRY CLINIC | Facility: CLINIC | Age: 37
End: 2022-05-09
Payer: MEDICARE

## 2022-05-09 DIAGNOSIS — E11.42 DIABETIC PERIPHERAL NEUROPATHY (HCC): ICD-10-CM

## 2022-05-09 DIAGNOSIS — M21.6X2 PLANTAR FLEXED METATARSAL BONE OF LEFT FOOT: ICD-10-CM

## 2022-05-09 DIAGNOSIS — Z86.31 PERSONAL HISTORY OF DIABETIC FOOT ULCER: ICD-10-CM

## 2022-05-09 DIAGNOSIS — Z89.411 HISTORY OF PARTIAL RAY AMPUTATION OF FIRST TOE OF RIGHT FOOT (HCC): ICD-10-CM

## 2022-05-09 DIAGNOSIS — M20.41 HAMMER TOE OF RIGHT FOOT: ICD-10-CM

## 2022-05-09 DIAGNOSIS — L84 CALLUS OF FOOT: ICD-10-CM

## 2022-05-09 DIAGNOSIS — B35.1 OM (ONYCHOMYCOSIS): ICD-10-CM

## 2022-05-09 PROCEDURE — 99213 OFFICE O/P EST LOW 20 MIN: CPT | Performed by: PODIATRIST

## 2022-05-16 RX ORDER — AMLODIPINE BESYLATE 10 MG/1
TABLET ORAL
Qty: 90 TABLET | Refills: 0 | OUTPATIENT
Start: 2022-05-16

## 2022-07-11 ENCOUNTER — APPOINTMENT (OUTPATIENT)
Dept: GENERAL RADIOLOGY | Facility: HOSPITAL | Age: 37
End: 2022-07-11
Attending: EMERGENCY MEDICINE
Payer: MEDICARE

## 2022-07-11 ENCOUNTER — HOSPITAL ENCOUNTER (EMERGENCY)
Facility: HOSPITAL | Age: 37
Discharge: HOME | End: 2022-07-11
Attending: EMERGENCY MEDICINE
Payer: MEDICARE

## 2022-07-11 VITALS
RESPIRATION RATE: 17 BRPM | DIASTOLIC BLOOD PRESSURE: 83 MMHG | HEIGHT: 72.44 IN | HEART RATE: 81 BPM | WEIGHT: 195 LBS | SYSTOLIC BLOOD PRESSURE: 135 MMHG | OXYGEN SATURATION: 96 % | TEMPERATURE: 98 F | BODY MASS INDEX: 26.13 KG/M2

## 2022-07-11 DIAGNOSIS — S92.901A CLOSED FRACTURE OF RIGHT FOOT, INITIAL ENCOUNTER: Primary | ICD-10-CM

## 2022-07-11 LAB
ALBUMIN SERPL-MCNC: 3.7 G/DL (ref 3.4–5)
ALBUMIN/GLOB SERPL: 0.8 {RATIO} (ref 1–2)
ALP LIVER SERPL-CCNC: 74 U/L
ALT SERPL-CCNC: 20 U/L
ANION GAP SERPL CALC-SCNC: 8 MMOL/L (ref 0–18)
AST SERPL-CCNC: 12 U/L (ref 15–37)
BASOPHILS # BLD AUTO: 0.04 X10(3) UL (ref 0–0.2)
BASOPHILS NFR BLD AUTO: 0.6 %
BILIRUB SERPL-MCNC: 0.7 MG/DL (ref 0.1–2)
BUN BLD-MCNC: 37 MG/DL (ref 7–18)
CALCIUM BLD-MCNC: 9.4 MG/DL (ref 8.5–10.1)
CHLORIDE SERPL-SCNC: 96 MMOL/L (ref 98–112)
CO2 SERPL-SCNC: 31 MMOL/L (ref 21–32)
CREAT BLD-MCNC: 7.1 MG/DL
CRP SERPL-MCNC: 1.61 MG/DL (ref ?–0.3)
EOSINOPHIL # BLD AUTO: 0.04 X10(3) UL (ref 0–0.7)
EOSINOPHIL NFR BLD AUTO: 0.6 %
ERYTHROCYTE [DISTWIDTH] IN BLOOD BY AUTOMATED COUNT: 15.9 %
ERYTHROCYTE [SEDIMENTATION RATE] IN BLOOD: 35 MM/HR
GLOBULIN PLAS-MCNC: 4.4 G/DL (ref 2.8–4.4)
GLUCOSE BLD-MCNC: 123 MG/DL (ref 70–99)
HCT VFR BLD AUTO: 30.9 %
HGB BLD-MCNC: 9.9 G/DL
IMM GRANULOCYTES # BLD AUTO: 0.02 X10(3) UL (ref 0–1)
IMM GRANULOCYTES NFR BLD: 0.3 %
LYMPHOCYTES # BLD AUTO: 0.84 X10(3) UL (ref 1–4)
LYMPHOCYTES NFR BLD AUTO: 11.6 %
MCH RBC QN AUTO: 28.9 PG (ref 26–34)
MCHC RBC AUTO-ENTMCNC: 32 G/DL (ref 31–37)
MCV RBC AUTO: 90.4 FL
MONOCYTES # BLD AUTO: 0.55 X10(3) UL (ref 0.1–1)
MONOCYTES NFR BLD AUTO: 7.6 %
NEUTROPHILS # BLD AUTO: 5.78 X10 (3) UL (ref 1.5–7.7)
NEUTROPHILS # BLD AUTO: 5.78 X10(3) UL (ref 1.5–7.7)
NEUTROPHILS NFR BLD AUTO: 79.3 %
OSMOLALITY SERPL CALC.SUM OF ELEC: 290 MOSM/KG (ref 275–295)
PLATELET # BLD AUTO: 208 10(3)UL (ref 150–450)
POTASSIUM SERPL-SCNC: 3.9 MMOL/L (ref 3.5–5.1)
PROT SERPL-MCNC: 8.1 G/DL (ref 6.4–8.2)
RBC # BLD AUTO: 3.42 X10(6)UL
SODIUM SERPL-SCNC: 135 MMOL/L (ref 136–145)
WBC # BLD AUTO: 7.3 X10(3) UL (ref 4–11)

## 2022-07-11 PROCEDURE — 85652 RBC SED RATE AUTOMATED: CPT | Performed by: EMERGENCY MEDICINE

## 2022-07-11 PROCEDURE — 28470 CLTX METATARSAL FX WO MNP EA: CPT

## 2022-07-11 PROCEDURE — 96374 THER/PROPH/DIAG INJ IV PUSH: CPT

## 2022-07-11 PROCEDURE — 99284 EMERGENCY DEPT VISIT MOD MDM: CPT

## 2022-07-11 PROCEDURE — 85025 COMPLETE CBC W/AUTO DIFF WBC: CPT | Performed by: EMERGENCY MEDICINE

## 2022-07-11 PROCEDURE — 86140 C-REACTIVE PROTEIN: CPT | Performed by: EMERGENCY MEDICINE

## 2022-07-11 PROCEDURE — 73630 X-RAY EXAM OF FOOT: CPT | Performed by: EMERGENCY MEDICINE

## 2022-07-11 PROCEDURE — 80053 COMPREHEN METABOLIC PANEL: CPT | Performed by: EMERGENCY MEDICINE

## 2022-07-11 RX ORDER — CEFAZOLIN SODIUM/WATER 2 G/20 ML
2 SYRINGE (ML) INTRAVENOUS ONCE
Status: COMPLETED | OUTPATIENT
Start: 2022-07-11 | End: 2022-07-11

## 2022-07-11 RX ORDER — SULFAMETHOXAZOLE AND TRIMETHOPRIM 800; 160 MG/1; MG/1
1 TABLET ORAL 2 TIMES DAILY
Qty: 20 TABLET | Refills: 0 | Status: SHIPPED | OUTPATIENT
Start: 2022-07-11 | End: 2022-07-21

## 2022-07-11 RX ORDER — CEFAZOLIN SODIUM/WATER 2 G/20 ML
2 SYRINGE (ML) INTRAVENOUS ONCE
Status: DISCONTINUED | OUTPATIENT
Start: 2022-07-11 | End: 2022-07-11

## 2022-07-11 NOTE — ED INITIAL ASSESSMENT (HPI)
Pt c/o of redness and pain to his right foot since yesterday. Pt denies fevers. No drainage noted to site.  Hx diabetes

## 2022-08-09 ENCOUNTER — OFFICE VISIT (OUTPATIENT)
Dept: PODIATRY CLINIC | Facility: CLINIC | Age: 37
End: 2022-08-09
Payer: MEDICARE

## 2022-08-09 DIAGNOSIS — N18.6 ESRD ON HEMODIALYSIS (HCC): ICD-10-CM

## 2022-08-09 DIAGNOSIS — E11.42 DIABETIC PERIPHERAL NEUROPATHY (HCC): Primary | ICD-10-CM

## 2022-08-09 DIAGNOSIS — Z99.2 ESRD ON HEMODIALYSIS (HCC): ICD-10-CM

## 2022-08-09 DIAGNOSIS — B35.1 OM (ONYCHOMYCOSIS): ICD-10-CM

## 2022-08-09 DIAGNOSIS — Z89.411 HISTORY OF PARTIAL RAY AMPUTATION OF FIRST TOE OF RIGHT FOOT (HCC): ICD-10-CM

## 2022-08-09 PROCEDURE — 11721 DEBRIDE NAIL 6 OR MORE: CPT | Performed by: PODIATRIST

## 2022-08-10 ENCOUNTER — TELEPHONE (OUTPATIENT)
Dept: NEPHROLOGY | Facility: CLINIC | Age: 37
End: 2022-08-10

## 2022-08-10 RX ORDER — METOPROLOL SUCCINATE 25 MG/1
25 TABLET, EXTENDED RELEASE ORAL DAILY
Qty: 90 TABLET | Refills: 3 | Status: SHIPPED | OUTPATIENT
Start: 2022-08-10

## 2022-08-10 RX ORDER — LISINOPRIL 40 MG/1
40 TABLET ORAL DAILY
Qty: 90 TABLET | Refills: 3 | Status: SHIPPED | OUTPATIENT
Start: 2022-08-10

## 2022-08-10 RX ORDER — AMLODIPINE BESYLATE 10 MG/1
10 TABLET ORAL DAILY
Qty: 90 TABLET | Refills: 3 | Status: SHIPPED | OUTPATIENT
Start: 2022-08-10

## 2022-09-02 NOTE — PROGRESS NOTES
Blanca Puente is a 38year old male.  Patient presents with:  Post-Op: amputation of great toe ,denies pain today         HPI:   Patient returns the clinic now 1 week status post surgery overall doing well no complaints said he notices the swelling in of children: Not on file      Years of education: Not on file      Highest education level: Not on file    Tobacco Use      Smoking status: Never Smoker      Smokeless tobacco: Never Used    Substance and Sexual Activity      Alcohol use: No        Alcohol Yes

## 2022-11-09 ENCOUNTER — OFFICE VISIT (OUTPATIENT)
Dept: PODIATRY CLINIC | Facility: CLINIC | Age: 37
End: 2022-11-09
Payer: MEDICARE

## 2022-11-09 DIAGNOSIS — E11.42 DIABETIC PERIPHERAL NEUROPATHY (HCC): ICD-10-CM

## 2022-11-09 DIAGNOSIS — N18.6 ESRD ON HEMODIALYSIS (HCC): Primary | ICD-10-CM

## 2022-11-09 DIAGNOSIS — Z99.2 ESRD ON HEMODIALYSIS (HCC): Primary | ICD-10-CM

## 2022-11-09 DIAGNOSIS — B35.1 OM (ONYCHOMYCOSIS): ICD-10-CM

## 2022-11-09 DIAGNOSIS — Z89.411 HISTORY OF PARTIAL RAY AMPUTATION OF FIRST TOE OF RIGHT FOOT (HCC): ICD-10-CM

## 2022-11-09 PROCEDURE — 99213 OFFICE O/P EST LOW 20 MIN: CPT | Performed by: PODIATRIST

## 2023-05-09 ENCOUNTER — OFFICE VISIT (OUTPATIENT)
Dept: PODIATRY CLINIC | Facility: CLINIC | Age: 38
End: 2023-05-09

## 2023-05-09 DIAGNOSIS — N18.6 ESRD ON HEMODIALYSIS (HCC): ICD-10-CM

## 2023-05-09 DIAGNOSIS — E11.42 DIABETIC PERIPHERAL NEUROPATHY (HCC): Primary | ICD-10-CM

## 2023-05-09 DIAGNOSIS — Z89.411 HISTORY OF PARTIAL RAY AMPUTATION OF FIRST TOE OF RIGHT FOOT (HCC): ICD-10-CM

## 2023-05-09 DIAGNOSIS — L84 CALLUS OF FOOT: ICD-10-CM

## 2023-05-09 DIAGNOSIS — Z99.2 ESRD ON HEMODIALYSIS (HCC): ICD-10-CM

## 2023-05-09 DIAGNOSIS — B35.1 OM (ONYCHOMYCOSIS): ICD-10-CM

## 2023-05-09 PROCEDURE — 99213 OFFICE O/P EST LOW 20 MIN: CPT | Performed by: PODIATRIST

## 2023-06-14 NOTE — H&P
[address]    PATIENT'S NAME: Lesli Crisostomo   ATTENDING PHYSICIAN: Gina Queen M.D.    PATIENT ACCOUNT#:   [de-identified]    [facility]  MEDICAL RECORD #:   [id number]       YOB: 1985  ADMISSION DATE: Soft.  No tenderness or masses. EXTREMITIES:  Femoral pulses are palpable. Popliteal and pedal pulses are palpable. Upper extremity pulses are palpable. Normal Gautam test.  No gross veins seen on either upper extremity.      LABORATORY DATA:  His last c asu handoff tool

## 2023-07-10 ENCOUNTER — OFFICE VISIT (OUTPATIENT)
Dept: PODIATRY CLINIC | Facility: CLINIC | Age: 38
End: 2023-07-10

## 2023-07-10 DIAGNOSIS — L84 CALLUS OF FOOT: ICD-10-CM

## 2023-07-10 DIAGNOSIS — Z86.31 PERSONAL HISTORY OF DIABETIC FOOT ULCER: ICD-10-CM

## 2023-07-10 DIAGNOSIS — B35.1 OM (ONYCHOMYCOSIS): ICD-10-CM

## 2023-07-10 DIAGNOSIS — N18.6 ESRD ON HEMODIALYSIS (HCC): ICD-10-CM

## 2023-07-10 DIAGNOSIS — Z99.2 ESRD ON HEMODIALYSIS (HCC): ICD-10-CM

## 2023-07-10 DIAGNOSIS — Z89.411 HISTORY OF PARTIAL RAY AMPUTATION OF FIRST TOE OF RIGHT FOOT (HCC): ICD-10-CM

## 2023-07-10 DIAGNOSIS — E11.42 DIABETIC PERIPHERAL NEUROPATHY (HCC): Primary | ICD-10-CM

## 2023-07-10 PROCEDURE — 99213 OFFICE O/P EST LOW 20 MIN: CPT | Performed by: PODIATRIST

## 2023-07-10 NOTE — PROGRESS NOTES
Jose Gregorio is a 34year old male. Patient presents with:  Diabetic Foot Care: Nail care and foot check- Fbs was not checked in the AM- A1C 6.1 04/06/23- patient denies pain at this time. HPI:   Patient returns to the clinic for checkup on his feet. He has no complaints of open draining wounds no complaints of swelling or redness no complaints of ulcers. He continues to work at [employer]. He is still awaiting transplant. At today's visit reviewed nurse's history as taken above, allergies medications and medical history as documented below. All changes duly noted  Allergies: Patient has no known allergies. Current Outpatient Medications   Medication Sig Dispense Refill    lisinopril 40 MG Oral Tab Take 1 tablet (40 mg total) by mouth daily. 90 tablet 3    amLODIPine 10 MG Oral Tab Take 1 tablet (10 mg total) by mouth daily. 90 tablet 3    metoprolol succinate ER 25 MG Oral Tablet 24 Hr Take 1 tablet (25 mg total) by mouth daily. 90 tablet 3    LITHIUM OR Take by mouth in the morning, at noon, and at bedtime. Vancomycin HCl 500 MG Intravenous Recon Soln Inject 500 mg into the vein Every Monday, Wednesday, and Friday. Given after hemodialysis on Monday/Wednesday/Friday 18 each 0    amLODIPine 10 MG Oral Tab Take 1 tablet (10 mg total) by mouth daily. apixaban 2.5 MG Oral Tab Take 1 tablet (2.5 mg total) by mouth 2 (two) times daily. insulin glargine (LANTUS SOLOSTAR) 100 UNIT/ML Subcutaneous Solution Pen-injector Inject 6 Units into the skin nightly. 15 mL 1    Sevelamer 800 MG Oral Tab Take 1 tablet (800 mg total) by mouth 3 (three) times daily with meals. lisinopril 40 MG Oral Tab Take 1 tablet (40 mg total) by mouth daily. Metoprolol Succinate ER 25 MG Oral Tablet 24 Hr Take 1 tablet (25 mg total) by mouth daily.  30 tablet 11    atorvastatin 40 MG Oral Tab Take 1 tablet (40 mg total) by mouth nightly.      ergocalciferol 1.25 MG (68866 UT) Oral Cap Take 1 capsule (50,000 Units total) by mouth every 30 (thirty) days. calcium acetate 667 MG Oral Cap Take 2 capsules (1,334 mg total) by mouth 3 (three) times daily. Past Medical History:   Diagnosis Date    Diabetes [facility])     Dialysis patient [facility])     serina [facility] 89 M-W-F    High blood pressure     High cholesterol     History of blood transfusion     10/2019    Neuropathy     Renal disorder       Past Surgical History:   Procedure Laterality Date    AMPUTATION METATARSAL+TOE,SINGLE      AV FISTULA REVISION, OPEN      CATARACT      OTHER      amputation of R great toe      History reviewed. No pertinent family history. Social History    Socioeconomic History      Marital status: Single    Tobacco Use      Smoking status: Never      Smokeless tobacco: Never    Vaping Use      Vaping Use: Never used    Substance and Sexual Activity      Alcohol use: No        Alcohol/week: 0.0 standard drinks of alcohol      Drug use: No          REVIEW OF SYSTEMS:   Today reviewed systens as documented below  GENERAL HEALTH: feels well otherwise  SKIN: Refer to exam below  RESPIRATORY: denies shortness of breath with exertion  CARDIOVASCULAR: denies chest pain on exertion  GI: denies abdominal pain and denies heartburn  NEURO: denies headaches    EXAM:   There were no vitals taken for this visit. GENERAL: well developed, well nourished, in no apparent distress  EXTREMITIES:   1. Integument: The skin on his feet is warm and dry he has surgical scars from previous amputations which was the fifth toe and first toe on the right foot and partial fifth ray on the left these incisions remain intact without any sign of infection. He does have some dry skin and cracking on the lateral aspect of his left foot he was informed of this recommended moisturizing cream once daily. 2. Vascular: The patient has palpable pulses   3.  Neurologic: Patient has diminished sensorium is lost protective sensation in all dermatomes on her now deformed foot   4. Musculoskeletal: Patient has amputations as documented above    ASSESSMENT AND PLAN:   Diagnoses and all orders for this visit:    Diabetic peripheral neuropathy ([facility] [address]    History of partial ray amputation of first toe of right foot ([facility] [address]    OM (onychomycosis)    ESRD on hemodialysis ([facility] [address]    Callus of foot    Personal history of diabetic foot ulcer        Plan: Today rendered nail trimming. Gave him advice on taking care of his feet his shoes are over a-year-old therefore prescribed new shoes at today's visit. We will get him fitted  clinics. At today's visit I reminded the patient about daily foot checks  Reminded patient again of proper control of his diabetes to help prevent any severe complications in his feet  Proper foot hygiene moisturizing except between the toes was reviewed  Patient knows with signs of infection or go to the emergency room should he notice them. In addition to that I told the patient that at this particular point in time if he is in a transplant program we have to watch his feet very carefully because if he gets sores or ulcers he may not be able to go ahead with the transplant if called. Advised follow-up again every 2 to 3 months for routine care but emphasized to him the importance of coming in sooner if he notices any problems. The patient indicates understanding of these issues and agrees to the plan.     Sharif Langley DPM

## 2023-08-08 ENCOUNTER — MED REC SCAN ONLY (OUTPATIENT)
Dept: NEPHROLOGY | Facility: CLINIC | Age: 38
End: 2023-08-08

## 2023-09-11 ENCOUNTER — OFFICE VISIT (OUTPATIENT)
Dept: PODIATRY CLINIC | Facility: CLINIC | Age: 38
End: 2023-09-11

## 2023-09-11 DIAGNOSIS — E11.42 DIABETIC PERIPHERAL NEUROPATHY (HCC): Primary | ICD-10-CM

## 2023-09-11 DIAGNOSIS — M20.41 HAMMER TOE OF RIGHT FOOT: ICD-10-CM

## 2023-09-11 DIAGNOSIS — Z89.411 HISTORY OF PARTIAL RAY AMPUTATION OF FIRST TOE OF RIGHT FOOT (HCC): ICD-10-CM

## 2023-09-11 DIAGNOSIS — L84 CALLUS OF FOOT: ICD-10-CM

## 2023-09-11 DIAGNOSIS — B35.1 OM (ONYCHOMYCOSIS): ICD-10-CM

## 2023-09-11 DIAGNOSIS — Z99.2 ESRD ON HEMODIALYSIS (HCC): ICD-10-CM

## 2023-09-11 DIAGNOSIS — N18.6 ESRD ON HEMODIALYSIS (HCC): ICD-10-CM

## 2023-09-11 DIAGNOSIS — Z86.31 PERSONAL HISTORY OF DIABETIC FOOT ULCER: ICD-10-CM

## 2023-09-11 PROCEDURE — 99213 OFFICE O/P EST LOW 20 MIN: CPT | Performed by: PODIATRIST

## 2023-09-11 NOTE — PROGRESS NOTES
Rashida Aguilera is a 34year old male. Patient presents with:  Diabetic Foot Care: Pt in for DM foot care, pt did not check FBS this am, last A1c was 4/6/23 at 6.1- pt denies any pain today        HPI:   Patient returns to the clinic for checkup on his feet. He has no complaints of open draining wounds no complaints of swelling or redness no complaints of ulcers. He is still on the transplant list.  At today's visit reviewed nurse's history as taken above, allergies medications and medical history as documented below. All changes duly noted  Allergies: Patient has no known allergies. Current Outpatient Medications   Medication Sig Dispense Refill    lisinopril 40 MG Oral Tab Take 1 tablet (40 mg total) by mouth daily. 90 tablet 3    amLODIPine 10 MG Oral Tab Take 1 tablet (10 mg total) by mouth daily. 90 tablet 3    LITHIUM OR Take by mouth in the morning, at noon, and at bedtime. amLODIPine 10 MG Oral Tab Take 1 tablet (10 mg total) by mouth daily. apixaban 2.5 MG Oral Tab Take 1 tablet (2.5 mg total) by mouth 2 (two) times daily. insulin glargine (LANTUS SOLOSTAR) 100 UNIT/ML Subcutaneous Solution Pen-injector Inject 6 Units into the skin nightly. 15 mL 1    Sevelamer 800 MG Oral Tab Take 1 tablet (800 mg total) by mouth 3 (three) times daily with meals. Metoprolol Succinate ER 25 MG Oral Tablet 24 Hr Take 1 tablet (25 mg total) by mouth daily. 30 tablet 11    atorvastatin 40 MG Oral Tab Take 1 tablet (40 mg total) by mouth nightly.      ergocalciferol 1.25 MG (64059 UT) Oral Cap Take 1 capsule (50,000 Units total) by mouth every 30 (thirty) days. calcium acetate 667 MG Oral Cap Take 2 capsules (1,334 mg total) by mouth 3 (three) times daily. metoprolol succinate ER 25 MG Oral Tablet 24 Hr Take 1 tablet (25 mg total) by mouth daily. 90 tablet 3    Vancomycin HCl 500 MG Intravenous Recon Soln Inject 500 mg into the vein Every Monday, Wednesday, and Friday.  Given after hemodialysis on Monday/Wednesday/Friday (Patient not taking: Reported on 9/11/2023) 18 each 0    lisinopril 40 MG Oral Tab Take 1 tablet (40 mg total) by mouth daily. Past Medical History:   Diagnosis Date    Diabetes [facility])     Dialysis patient [facility])     [facility] [address] M-W-F    High blood pressure     High cholesterol     History of blood transfusion     10/2019    Neuropathy     Renal disorder       Past Surgical History:   Procedure Laterality Date    AMPUTATION METATARSAL+TOE,SINGLE      AV FISTULA REVISION, OPEN      CATARACT      OTHER      amputation of R great toe      History reviewed. No pertinent family history. Social History    Socioeconomic History      Marital status: Single    Tobacco Use      Smoking status: Never      Smokeless tobacco: Never    Vaping Use      Vaping Use: Never used    Substance and Sexual Activity      Alcohol use: No        Alcohol/week: 0.0 standard drinks of alcohol      Drug use: No          REVIEW OF SYSTEMS:   Today reviewed systens as documented below  GENERAL HEALTH: feels well otherwise  SKIN: Refer to exam below  RESPIRATORY: denies shortness of breath with exertion  CARDIOVASCULAR: denies chest pain on exertion  GI: denies abdominal pain and denies heartburn  NEURO: denies headaches    EXAM:   There were no vitals taken for this visit. GENERAL: well developed, well nourished, in no apparent distress  EXTREMITIES:   1. Integument: The skin on his feet is warm and dry he has surgical scars from previous amputations which was the fifth toe and first toe on the right foot and partial fifth ray on the left these incisions remain intact without any sign of infection. He does have some dry skin and cracking on the lateral aspect of his left foot he was informed of this recommended moisturizing cream once daily. 2. Vascular: The patient has palpable pulses   3.  Neurologic: Patient has diminished sensorium is lost protective sensation in all dermatomes on her now deformed foot   4. Musculoskeletal: Patient has amputations as documented above    ASSESSMENT AND PLAN:   Diagnoses and all orders for this visit:    Diabetic peripheral neuropathy ([facility]    History of partial ray amputation of first toe of right foot ([facility]    OM (onychomycosis)    ESRD on hemodialysis ([facility]    Callus of foot    Personal history of diabetic foot ulcer    Hammer toe of right foot        Plan: Today rendered nail trimming. Gave him advice on taking care of his feet his shoes are over a-year-old therefore prescribed new shoes at today's visit. We will get him fitted [facility] [place] clinics. At today's visit I reminded the patient about daily foot checks  Reminded patient again of proper control of his diabetes to help prevent any severe complications in his feet  Proper foot hygiene moisturizing except between the toes was reviewed  Patient knows with signs of infection or go to the emergency room should he notice them. He was again reminded if he notices any openings or draining wounds he is to come in right away. Advised follow-up again every 2 to 3 months for routine care but emphasized to him the importance of coming in sooner if he notices any problems. The patient indicates understanding of these issues and agrees to the plan.     Lizet Gannon DPM

## 2023-09-29 ENCOUNTER — MED REC SCAN ONLY (OUTPATIENT)
Dept: NEPHROLOGY | Facility: CLINIC | Age: 38
End: 2023-09-29

## 2023-12-04 ENCOUNTER — OFFICE VISIT (OUTPATIENT)
Dept: PODIATRY CLINIC | Facility: CLINIC | Age: 38
End: 2023-12-04

## 2023-12-04 DIAGNOSIS — N18.6 ESRD ON HEMODIALYSIS (HCC): ICD-10-CM

## 2023-12-04 DIAGNOSIS — Z99.2 ESRD ON HEMODIALYSIS (HCC): ICD-10-CM

## 2023-12-04 DIAGNOSIS — B35.1 OM (ONYCHOMYCOSIS): ICD-10-CM

## 2023-12-04 DIAGNOSIS — Z89.411 HISTORY OF PARTIAL RAY AMPUTATION OF FIRST TOE OF RIGHT FOOT (HCC): Primary | ICD-10-CM

## 2023-12-04 DIAGNOSIS — M20.41 HAMMER TOE OF RIGHT FOOT: ICD-10-CM

## 2023-12-04 DIAGNOSIS — Z86.31 PERSONAL HISTORY OF DIABETIC FOOT ULCER: ICD-10-CM

## 2023-12-04 NOTE — PROGRESS NOTES
Dema Fothergill is a 34year old male. No chief complaint on file. HPI:   Patient returns to the clinic for checkup on his feet. He has no complaints of open draining wounds no complaints of swelling or redness no complaints of ulcers. He is still on the transplant list.  At today's visit reviewed nurse's history as taken above, allergies medications and medical history as documented below. All changes duly noted  Allergies: Patient has no known allergies. Current Outpatient Medications   Medication Sig Dispense Refill    lisinopril 40 MG Oral Tab Take 1 tablet (40 mg total) by mouth daily. 90 tablet 3    amLODIPine 10 MG Oral Tab Take 1 tablet (10 mg total) by mouth daily. 90 tablet 3    metoprolol succinate ER 25 MG Oral Tablet 24 Hr Take 1 tablet (25 mg total) by mouth daily. 90 tablet 3    LITHIUM OR Take by mouth in the morning, at noon, and at bedtime. Vancomycin HCl 500 MG Intravenous Recon Soln Inject 500 mg into the vein Every Monday, Wednesday, and Friday. Given after hemodialysis on Monday/Wednesday/Friday (Patient not taking: Reported on 9/11/2023) 18 each 0    amLODIPine 10 MG Oral Tab Take 1 tablet (10 mg total) by mouth daily. apixaban 2.5 MG Oral Tab Take 1 tablet (2.5 mg total) by mouth 2 (two) times daily. insulin glargine (LANTUS SOLOSTAR) 100 UNIT/ML Subcutaneous Solution Pen-injector Inject 6 Units into the skin nightly. 15 mL 1    Sevelamer 800 MG Oral Tab Take 1 tablet (800 mg total) by mouth 3 (three) times daily with meals. lisinopril 40 MG Oral Tab Take 1 tablet (40 mg total) by mouth daily. Metoprolol Succinate ER 25 MG Oral Tablet 24 Hr Take 1 tablet (25 mg total) by mouth daily. 30 tablet 11    atorvastatin 40 MG Oral Tab Take 1 tablet (40 mg total) by mouth nightly.      ergocalciferol 1.25 MG (28299 UT) Oral Cap Take 1 capsule (50,000 Units total) by mouth every 30 (thirty) days.       calcium acetate 667 MG Oral Cap Take 2 capsules (1,334 mg total) by mouth 3 (three) times daily. Past Medical History:   Diagnosis Date    Diabetes [facility])     Dialysis patient [facility])     serina [facility] 89 M-W-F    High blood pressure     High cholesterol     History of blood transfusion     10/2019    Neuropathy     Renal disorder       Past Surgical History:   Procedure Laterality Date    AMPUTATION METATARSAL+TOE,SINGLE      AV FISTULA REVISION, OPEN      CATARACT      OTHER      amputation of R great toe      No family history on file. Social History     Socioeconomic History    Marital status: Single   Tobacco Use    Smoking status: Never    Smokeless tobacco: Never   Vaping Use    Vaping Use: Never used   Substance and Sexual Activity    Alcohol use: No     Alcohol/week: 0.0 standard drinks of alcohol    Drug use: No           REVIEW OF SYSTEMS:   Today reviewed systens as documented below  GENERAL HEALTH: feels well otherwise  SKIN: Refer to exam below  RESPIRATORY: denies shortness of breath with exertion  CARDIOVASCULAR: denies chest pain on exertion  GI: denies abdominal pain and denies heartburn  NEURO: denies headaches    EXAM:   There were no vitals taken for this visit. GENERAL: well developed, well nourished, in no apparent distress  EXTREMITIES:   1. Integument: The skin on his feet is warm and dry he has surgical scars from previous amputations which was the fifth toe and first toe on the right foot and partial fifth ray on the left these incisions remain intact without any sign of infection. He does have some dry skin and cracking on the lateral aspect of his left foot he was informed of this recommended moisturizing cream once daily. There are no sores ulcerations or other problems noted there are no interdigital macerations noted. 2. Vascular: The patient has palpable pulses   3. Neurologic: Patient has diminished sensorium is lost protective sensation in all dermatomes on her now deformed foot   4.  Musculoskeletal: Patient has amputations as documented above    ASSESSMENT AND PLAN:   Diagnoses and all orders for this visit:    History of partial ray amputation of first toe of right foot ([facility]    OM (onychomycosis)    ESRD on hemodialysis ([facility]    Personal history of diabetic foot ulcer    Hammer toe of right foot        Plan: Today rendered nail trimming. Gave him advice on taking care of his feet his shoes are over a-year-old therefore prescribed new shoes at today's visit. At today's visit I reminded the patient about daily foot checks  Reminded patient again of proper control of his diabetes to help prevent any severe complications in his feet  Proper foot hygiene moisturizing except between the toes was reviewed  Patient knows with signs of infection or go to the emergency room should he notice them. He was again reminded if he notices any openings or draining wounds he is to come in right away. Advised follow-up again every 2 to 3 months for routine care but emphasized to him the importance of coming in sooner if he notices any problems. The patient indicates understanding of these issues and agrees to the plan.   Patient relates she is still on the transplant list.    Ronaldo Goldsmith DPM

## 2024-03-05 ENCOUNTER — OFFICE VISIT (OUTPATIENT)
Dept: PODIATRY CLINIC | Facility: CLINIC | Age: 39
End: 2024-03-05

## 2024-03-05 DIAGNOSIS — L84 CALLUS OF FOOT: ICD-10-CM

## 2024-03-05 DIAGNOSIS — Z89.411 HISTORY OF PARTIAL RAY AMPUTATION OF FIRST TOE OF RIGHT FOOT (HCC): ICD-10-CM

## 2024-03-05 DIAGNOSIS — B35.1 OM (ONYCHOMYCOSIS): ICD-10-CM

## 2024-03-05 DIAGNOSIS — N18.6 ESRD ON HEMODIALYSIS (HCC): ICD-10-CM

## 2024-03-05 DIAGNOSIS — M20.41 HAMMER TOE OF RIGHT FOOT: ICD-10-CM

## 2024-03-05 DIAGNOSIS — Z99.2 ESRD ON HEMODIALYSIS (HCC): ICD-10-CM

## 2024-03-05 DIAGNOSIS — E11.42 DIABETIC PERIPHERAL NEUROPATHY (HCC): Primary | ICD-10-CM

## 2024-03-05 PROCEDURE — 99213 OFFICE O/P EST LOW 20 MIN: CPT | Performed by: PODIATRIST

## 2024-03-05 NOTE — PROGRESS NOTES
Kuldip Santacruz is a 38 year old male.   Chief Complaint   Patient presents with    Diabetic Foot Care     Nail care and foot check- No FBS taken today. A1C 7.1 on 1/30.         HPI:   Patient returns to the clinic for checkup on his feet.  Patient relates no history of ulcers no draining areas.  He continues to work at [employer]  At today's visit reviewed nurse's history as taken above, allergies medications and medical history as documented below.  All changes duly noted  Allergies: Patient has no known allergies.   Current Outpatient Medications   Medication Sig Dispense Refill    lisinopril 40 MG Oral Tab Take 1 tablet (40 mg total) by mouth daily. 90 tablet 3    amLODIPine 10 MG Oral Tab Take 1 tablet (10 mg total) by mouth daily. 90 tablet 3    metoprolol succinate ER 25 MG Oral Tablet 24 Hr Take 1 tablet (25 mg total) by mouth daily. 90 tablet 3    LITHIUM OR Take by mouth in the morning, at noon, and at bedtime.      Vancomycin HCl 500 MG Intravenous Recon Soln Inject 500 mg into the vein Every Monday, Wednesday, and Friday. Given after hemodialysis on Monday/Wednesday/Friday 18 each 0    amLODIPine 10 MG Oral Tab Take 1 tablet (10 mg total) by mouth daily.      apixaban 2.5 MG Oral Tab Take 1 tablet (2.5 mg total) by mouth 2 (two) times daily.      insulin glargine (LANTUS SOLOSTAR) 100 UNIT/ML Subcutaneous Solution Pen-injector Inject 6 Units into the skin nightly. 15 mL 1    Sevelamer 800 MG Oral Tab Take 1 tablet (800 mg total) by mouth 3 (three) times daily with meals.      lisinopril 40 MG Oral Tab Take 1 tablet (40 mg total) by mouth daily.      Metoprolol Succinate ER 25 MG Oral Tablet 24 Hr Take 1 tablet (25 mg total) by mouth daily. 30 tablet 11    atorvastatin 40 MG Oral Tab Take 1 tablet (40 mg total) by mouth nightly.      ergocalciferol 1.25 MG (99556 UT) Oral Cap Take 1 capsule (50,000 Units total) by mouth every 30 (thirty) days.      calcium acetate 667 MG Oral Cap Take 2 capsules (1,334 mg  total) by mouth 3 (three) times daily. (Patient not taking: Reported on 3/5/2024)        Past Medical History:   Diagnosis Date    Diabetes (HCC)     Dialysis patient ([facility])     [facility]    High blood pressure     High cholesterol     History of blood transfusion     10/2019    Neuropathy     Renal disorder       Past Surgical History:   Procedure Laterality Date    AMPUTATION METATARSAL+TOE,SINGLE      AV FISTULA REVISION, OPEN      CATARACT      OTHER      amputation of R great toe      No family history on file.   Social History     Socioeconomic History    Marital status: Single   Tobacco Use    Smoking status: Never    Smokeless tobacco: Never   Vaping Use    Vaping Use: Never used   Substance and Sexual Activity    Alcohol use: No     Alcohol/week: 0.0 standard drinks of alcohol    Drug use: No           REVIEW OF SYSTEMS:   Today reviewed systens as documented below  GENERAL HEALTH: feels well otherwise  SKIN: Refer to exam below  RESPIRATORY: denies shortness of breath with exertion  CARDIOVASCULAR: denies chest pain on exertion  GI: denies abdominal pain and denies heartburn  NEURO: denies headaches    EXAM:   There were no vitals taken for this visit.  GENERAL: well developed, well nourished, in no apparent distress  EXTREMITIES:   1. Integument: The skin on his feet is warm and dry he has surgical scars from previous amputations which was the fifth toe and first toe on the right foot and partial fifth ray on the left these incisions remain intact without any sign of infection.  He does have some dry skin and cracking on the lateral aspect of his left foot he was informed of this recommended moisturizing cream once daily.  There are no sores ulcerations or other problems noted there are no interdigital macerations noted.   2. Vascular: The patient has palpable pulses   3. Neurologic: Patient has diminished sensorium is lost protective sensation in all dermatomes on her now deformed  foot   4. Musculoskeletal: Patient has amputations as documented above    ASSESSMENT AND PLAN:   Diagnoses and all orders for this visit:    Diabetic peripheral neuropathy (HCC)    ESRD on hemodialysis (HCC)    Hammer toe of right foot    Callus of foot    OM (onychomycosis)    History of partial ray amputation of first toe of right foot (HCC)        Plan: Today rendered nail trimming.  Gave him advice on taking care of his feet his shoes are over a-year-old therefore prescribed new shoes at today's visit.    At today's visit I reminded the patient about daily foot checks  Reminded patient again of proper control of his diabetes to help prevent any severe complications in his feet  Proper foot hygiene moisturizing except between the toes was reviewed  Patient knows with signs of infection or go to the emergency room should he notice them.  He was again reminded if he notices any openings or draining wounds he is to come in right away.  Advised follow-up again every 2 to 3 months for routine care but emphasized to him the importance of coming in sooner if he notices any problems.    The patient indicates understanding of these issues and agrees to the plan.  Patient relates she is still on the transplant list.    Brendan Kerr DPM

## 2024-03-19 ENCOUNTER — HOSPITAL ENCOUNTER (OUTPATIENT)
Dept: ULTRASOUND IMAGING | Facility: HOSPITAL | Age: 39
Discharge: HOME | End: 2024-03-19
Attending: SURGERY
Payer: MEDICARE

## 2024-03-19 DIAGNOSIS — T82.848A: ICD-10-CM

## 2024-03-19 DIAGNOSIS — I77.89 ARTERIAL STEAL SYNDROME (HCC): ICD-10-CM

## 2024-03-19 PROCEDURE — 93990 DOPPLER FLOW TESTING: CPT | Performed by: SURGERY

## 2024-06-04 ENCOUNTER — OFFICE VISIT (OUTPATIENT)
Dept: PODIATRY CLINIC | Facility: CLINIC | Age: 39
End: 2024-06-04

## 2024-06-04 DIAGNOSIS — Z99.2 ESRD ON HEMODIALYSIS (HCC): ICD-10-CM

## 2024-06-04 DIAGNOSIS — E11.42 DIABETIC PERIPHERAL NEUROPATHY (HCC): Primary | ICD-10-CM

## 2024-06-04 DIAGNOSIS — L84 CALLUS OF FOOT: ICD-10-CM

## 2024-06-04 DIAGNOSIS — N18.6 ESRD ON HEMODIALYSIS (HCC): ICD-10-CM

## 2024-06-04 DIAGNOSIS — B35.1 OM (ONYCHOMYCOSIS): ICD-10-CM

## 2024-06-04 DIAGNOSIS — M20.41 HAMMER TOE OF RIGHT FOOT: ICD-10-CM

## 2024-06-04 DIAGNOSIS — Z86.31 PERSONAL HISTORY OF DIABETIC FOOT ULCER: ICD-10-CM

## 2024-06-04 DIAGNOSIS — Z89.411 HISTORY OF PARTIAL RAY AMPUTATION OF FIRST TOE OF RIGHT FOOT (HCC): ICD-10-CM

## 2024-06-04 PROCEDURE — 99213 OFFICE O/P EST LOW 20 MIN: CPT | Performed by: PODIATRIST

## 2024-06-04 NOTE — PROGRESS NOTES
Kuldip Santacruz is a 38 year old male.   Chief Complaint   Patient presents with    Diabetic Foot Care     Nail trim and foot check f/u- Last AIC= 6.8 on 10/30/2023- FBS this am was not taken- denies pain         HPI:   Patient returns to the clinic for checkup on his feet.  Patient relates no history of ulcers no draining areas.  He continues to work at [employer]  At today's visit reviewed nurse's history as taken above, allergies medications and medical history as documented below.  All changes duly noted  Allergies: Patient has no known allergies.   Current Outpatient Medications   Medication Sig Dispense Refill    lisinopril 40 MG Oral Tab Take 1 tablet (40 mg total) by mouth daily. 90 tablet 3    amLODIPine 10 MG Oral Tab Take 1 tablet (10 mg total) by mouth daily. 90 tablet 3    metoprolol succinate ER 25 MG Oral Tablet 24 Hr Take 1 tablet (25 mg total) by mouth daily. 90 tablet 3    LITHIUM OR Take by mouth in the morning, at noon, and at bedtime.      Vancomycin HCl 500 MG Intravenous Recon Soln Inject 500 mg into the vein Every Monday, Wednesday, and Friday. Given after hemodialysis on Monday/Wednesday/Friday 18 each 0    amLODIPine 10 MG Oral Tab Take 1 tablet (10 mg total) by mouth daily.      apixaban 2.5 MG Oral Tab Take 1 tablet (2.5 mg total) by mouth 2 (two) times daily.      insulin glargine (LANTUS SOLOSTAR) 100 UNIT/ML Subcutaneous Solution Pen-injector Inject 6 Units into the skin nightly. 15 mL 1    Sevelamer 800 MG Oral Tab Take 1 tablet (800 mg total) by mouth 3 (three) times daily with meals.      lisinopril 40 MG Oral Tab Take 1 tablet (40 mg total) by mouth daily.      Metoprolol Succinate ER 25 MG Oral Tablet 24 Hr Take 1 tablet (25 mg total) by mouth daily. 30 tablet 11    atorvastatin 40 MG Oral Tab Take 1 tablet (40 mg total) by mouth nightly.      ergocalciferol 1.25 MG (06149 UT) Oral Cap Take 1 capsule (50,000 Units total) by mouth every 30 (thirty) days.      calcium acetate 667 MG  Oral Cap Take 2 capsules (1,334 mg total) by mouth 3 (three) times daily. (Patient not taking: Reported on 3/5/2024)        Past Medical History:    Diabetes (HCC)    Dialysis patient (HCC)    [facility] M-W-F    High blood pressure    High cholesterol    History of blood transfusion    10/2019    Neuropathy    Renal disorder      Past Surgical History:   Procedure Laterality Date    Amputation metatarsal+toe,single      Av fistula revision, open      Cataract      Other      amputation of R great toe      History reviewed. No pertinent family history.   Social History     Socioeconomic History    Marital status: Single   Tobacco Use    Smoking status: Never    Smokeless tobacco: Never   Vaping Use    Vaping status: Never Used   Substance and Sexual Activity    Alcohol use: No     Alcohol/week: 0.0 standard drinks of alcohol    Drug use: No           REVIEW OF SYSTEMS:   Today reviewed systens as documented below  GENERAL HEALTH: feels well otherwise  SKIN: Refer to exam below  RESPIRATORY: denies shortness of breath with exertion  CARDIOVASCULAR: denies chest pain on exertion  GI: denies abdominal pain and denies heartburn  NEURO: denies headaches    EXAM:   There were no vitals taken for this visit.  GENERAL: well developed, well nourished, in no apparent distress  EXTREMITIES:   1. Integument: The skin on his feet is warm and dry he has surgical scars from previous amputations which was the fifth toe and first toe on the right foot and partial fifth ray on the left these incisions remain intact without any sign of infection.  He does have some dry skin and cracking on the lateral aspect of his left foot he was informed of this recommended moisturizing cream once daily.  There are no sores ulcerations or other problems noted there are no interdigital macerations noted.   2. Vascular: The patient has palpable pulses   3. Neurologic: Patient has diminished sensorium is lost protective sensation in  all dermatomes on her now deformed foot   4. Musculoskeletal: Patient has amputations as documented above  Right barefoot skin diabetic exam is abnormal with diabetic monofilament/sensation testing abnormal, amputation and/or ulceration, and dystrophic nails and/or dry skin   ASSESSMENT AND PLAN:   Diagnoses and all orders for this visit:    Diabetic peripheral neuropathy (HCC)    ESRD on hemodialysis (HCC)    Hammer toe of right foot    Callus of foot    OM (onychomycosis)    History of partial ray amputation of first toe of right foot (HCC)    Personal history of diabetic foot ulcer        Plan: Today rendered nail trimming.  Gave him advice on taking care of his feet his shoes are over a-year-old therefore prescribed new shoes at today's visit.    At today's visit I reminded the patient about daily foot checks  Reminded patient again of proper control of his diabetes to help prevent any severe complications in his feet  Proper foot hygiene moisturizing except between the toes was reviewed  Patient knows with signs of infection or go to the emergency room should he notice them.  He was again reminded if he notices any openings or draining wounds he is to come in right away.  Advised follow-up again every 2 to 3 months for routine care but emphasized to him the importance of coming in sooner if he notices any problems.    The patient indicates understanding of these issues and agrees to the plan.  Patient relates she is still on the transplant list.    Brendan Kerr DPM

## 2024-09-03 ENCOUNTER — OFFICE VISIT (OUTPATIENT)
Dept: PODIATRY CLINIC | Facility: CLINIC | Age: 39
End: 2024-09-03

## 2024-09-03 DIAGNOSIS — L84 CALLUS: ICD-10-CM

## 2024-09-03 DIAGNOSIS — Z86.31 PERSONAL HISTORY OF DIABETIC FOOT ULCER: ICD-10-CM

## 2024-09-03 DIAGNOSIS — Z89.411 HISTORY OF PARTIAL RAY AMPUTATION OF FIRST TOE OF RIGHT FOOT (HCC): ICD-10-CM

## 2024-09-03 DIAGNOSIS — E11.42 DIABETIC PERIPHERAL NEUROPATHY (HCC): Primary | ICD-10-CM

## 2024-09-03 DIAGNOSIS — M20.41 HAMMER TOE OF RIGHT FOOT: ICD-10-CM

## 2024-09-03 DIAGNOSIS — M21.6X2 PLANTAR FLEXED METATARSAL, LEFT: ICD-10-CM

## 2024-09-03 PROCEDURE — 99213 OFFICE O/P EST LOW 20 MIN: CPT | Performed by: PODIATRIST

## 2024-09-03 NOTE — PROGRESS NOTES
Kuldip Santacruz is a 39 year old male.   Chief Complaint   Patient presents with    Diabetic Foot Care     3 MO DFC-  Pt here for nail mandeep and foot check  FBS- not taken this AM - A1C- 7.1 on 01/30         HPI:   Patient returns to the clinic for checkup on his feet.  Patient relates no history of ulcers no draining areas.  He continues to work at [employer]..  At today's visit reviewed nurse's history as taken above, allergies medications and medical history as documented below.  All changes duly noted  Allergies: Patient has no known allergies.   Current Outpatient Medications   Medication Sig Dispense Refill    lisinopril 40 MG Oral Tab Take 1 tablet (40 mg total) by mouth daily. 90 tablet 3    amLODIPine 10 MG Oral Tab Take 1 tablet (10 mg total) by mouth daily. 90 tablet 3    metoprolol succinate ER 25 MG Oral Tablet 24 Hr Take 1 tablet (25 mg total) by mouth daily. 90 tablet 3    LITHIUM OR Take by mouth in the morning, at noon, and at bedtime.      Vancomycin HCl 500 MG Intravenous Recon Soln Inject 500 mg into the vein Every Monday, Wednesday, and Friday. Given after hemodialysis on Monday/Wednesday/Friday 18 each 0    amLODIPine 10 MG Oral Tab Take 1 tablet (10 mg total) by mouth daily.      apixaban 2.5 MG Oral Tab Take 1 tablet (2.5 mg total) by mouth 2 (two) times daily.      insulin glargine (LANTUS SOLOSTAR) 100 UNIT/ML Subcutaneous Solution Pen-injector Inject 6 Units into the skin nightly. 15 mL 1    Sevelamer 800 MG Oral Tab Take 1 tablet (800 mg total) by mouth 3 (three) times daily with meals.      lisinopril 40 MG Oral Tab Take 1 tablet (40 mg total) by mouth daily.      Metoprolol Succinate ER 25 MG Oral Tablet 24 Hr Take 1 tablet (25 mg total) by mouth daily. 30 tablet 11    atorvastatin 40 MG Oral Tab Take 1 tablet (40 mg total) by mouth nightly.      ergocalciferol 1.25 MG (87425 UT) Oral Cap Take 1 capsule (50,000 Units total) by mouth every 30 (thirty) days.      calcium acetate 667 MG Oral  Cap Take 2 capsules (1,334 mg total) by mouth 3 (three) times daily. (Patient not taking: Reported on 3/5/2024)        Past Medical History:    Diabetes (HCC)    Dialysis patient (HCC)    [facility]    High blood pressure    High cholesterol    History of blood transfusion    10/2019    Neuropathy    Renal disorder      Past Surgical History:   Procedure Laterality Date    Amputation metatarsal+toe,single      Av fistula revision, open      Cataract      Other      amputation of R great toe      History reviewed. No pertinent family history.   Social History     Socioeconomic History    Marital status: Single   Tobacco Use    Smoking status: Never    Smokeless tobacco: Never   Vaping Use    Vaping status: Never Used   Substance and Sexual Activity    Alcohol use: No     Alcohol/week: 0.0 standard drinks of alcohol    Drug use: No           REVIEW OF SYSTEMS:   Today reviewed systens as documented below  GENERAL HEALTH: feels well otherwise  SKIN: Refer to exam below  RESPIRATORY: denies shortness of breath with exertion  CARDIOVASCULAR: denies chest pain on exertion  GI: denies abdominal pain and denies heartburn  NEURO: denies headaches    EXAM:   There were no vitals taken for this visit.  GENERAL: well developed, well nourished, in no apparent distress  EXTREMITIES:   1. Integument: The skin on his feet is warm and dry he has surgical scars from previous amputations which was the fifth toe and first toe on the right foot and partial fifth ray on the left these incisions remain intact without any sign of infection.  He does have some dry skin and cracking on the lateral aspect of his left foot he was informed of this recommended moisturizing cream once daily.  There are no sores ulcerations or other problems noted there are no interdigital macerations noted.   2. Vascular: The patient has palpable pulses   3. Neurologic: Patient has diminished sensorium is lost protective sensation in all  dermatomes on her now deformed foot   4. Musculoskeletal: Patient has amputations as documented above  Bilateral barefoot skin diabetic exam is abnormal with diabetic monofilament/sensation testing abnormal, callus - with deeper color changes, amputation and/or ulceration, and dystrophic nails and/or dry skin   ASSESSMENT AND PLAN:   Diagnoses and all orders for this visit:    Diabetic peripheral neuropathy (HCC)  -     DME - External    Callus  -     DME - External    History of partial ray amputation of first toe of right foot (HCC)  -     DME - External    Hammer toe of right foot  -     DME - External    Plantar flexed metatarsal, left  -     DME - External    Personal history of diabetic foot ulcer  -     DME - External        Plan: Today rendered nail trimming.  Gave him advice on taking care of his feet his shoes are over a-year-old therefore prescribed new shoes at today's visit.    At today's visit I reminded the patient about daily foot checks  Reminded patient again of proper control of his diabetes to help prevent any severe complications in his feet  Proper foot hygiene moisturizing except between the toes was reviewed  Patient knows with signs of infection or go to the emergency room should he notice them.  He was again reminded if he notices any openings or draining wounds he is to come in right away.  Advised follow-up again every 2 to 3 months for routine care but emphasized to him the importance of coming in sooner if he notices any problems.  Refill prescription for diabetic shoes as his are now 1-year-old.    The patient indicates understanding of these issues and agrees to the plan.  Patient relates she is still on the transplant list.    Brendan Kerr DPM

## 2024-09-30 ENCOUNTER — MED REC SCAN ONLY (OUTPATIENT)
Dept: NEPHROLOGY | Facility: CLINIC | Age: 39
End: 2024-09-30

## 2024-12-05 ENCOUNTER — OFFICE VISIT (OUTPATIENT)
Dept: PODIATRY CLINIC | Facility: CLINIC | Age: 39
End: 2024-12-05

## 2024-12-05 DIAGNOSIS — N18.6 ESRD ON HEMODIALYSIS (HCC): ICD-10-CM

## 2024-12-05 DIAGNOSIS — L84 CALLUS OF FOOT: ICD-10-CM

## 2024-12-05 DIAGNOSIS — Z86.31 PERSONAL HISTORY OF DIABETIC FOOT ULCER: ICD-10-CM

## 2024-12-05 DIAGNOSIS — Z89.411 HISTORY OF PARTIAL RAY AMPUTATION OF FIRST TOE OF RIGHT FOOT (HCC): ICD-10-CM

## 2024-12-05 DIAGNOSIS — Z99.2 ESRD ON HEMODIALYSIS (HCC): ICD-10-CM

## 2024-12-05 DIAGNOSIS — L84 CALLUS: ICD-10-CM

## 2024-12-05 DIAGNOSIS — B35.1 OM (ONYCHOMYCOSIS): ICD-10-CM

## 2024-12-05 DIAGNOSIS — E11.42 DIABETIC PERIPHERAL NEUROPATHY (HCC): Primary | ICD-10-CM

## 2024-12-05 PROCEDURE — 99213 OFFICE O/P EST LOW 20 MIN: CPT | Performed by: PODIATRIST

## 2024-12-05 NOTE — PROGRESS NOTES
Kuldip Santacruz is a 39 year old male.   Chief Complaint   Patient presents with    Diabetic Foot Care     Nail care and foot check- fbg was not checked- A1c 7.9 10/15/24- pcp ldv 10/15/24          HPI:   Patient returns to the clinic no recent significant changes in his medical health.  Still on dialysis.  He presents today for routine care he cannot do it himself has a history of multiple toe amputations neuropathy diabetes..  At today's visit reviewed nurse's history as taken above, allergies medications and medical history as documented below.  All changes duly noted  Allergies: Patient has no known allergies.   Current Outpatient Medications   Medication Sig Dispense Refill    lisinopril 40 MG Oral Tab Take 1 tablet (40 mg total) by mouth daily. 90 tablet 3    amLODIPine 10 MG Oral Tab Take 1 tablet (10 mg total) by mouth daily. 90 tablet 3    metoprolol succinate ER 25 MG Oral Tablet 24 Hr Take 1 tablet (25 mg total) by mouth daily. 90 tablet 3    LITHIUM OR Take by mouth in the morning, at noon, and at bedtime.      Vancomycin HCl 500 MG Intravenous Recon Soln Inject 500 mg into the vein Every Monday, Wednesday, and Friday. Given after hemodialysis on Monday/Wednesday/Friday 18 each 0    amLODIPine 10 MG Oral Tab Take 1 tablet (10 mg total) by mouth daily.      apixaban 2.5 MG Oral Tab Take 1 tablet (2.5 mg total) by mouth 2 (two) times daily.      insulin glargine (LANTUS SOLOSTAR) 100 UNIT/ML Subcutaneous Solution Pen-injector Inject 6 Units into the skin nightly. 15 mL 1    Sevelamer 800 MG Oral Tab Take 1 tablet (800 mg total) by mouth 3 (three) times daily with meals.      lisinopril 40 MG Oral Tab Take 1 tablet (40 mg total) by mouth daily.      Metoprolol Succinate ER 25 MG Oral Tablet 24 Hr Take 1 tablet (25 mg total) by mouth daily. 30 tablet 11    atorvastatin 40 MG Oral Tab Take 1 tablet (40 mg total) by mouth nightly.      ergocalciferol 1.25 MG (03790 UT) Oral Cap Take 1 capsule (50,000 Units  total) by mouth every 30 (thirty) days.      calcium acetate 667 MG Oral Cap Take 2 capsules (1,334 mg total) by mouth 3 (three) times daily. (Patient not taking: Reported on 12/5/2024)        Past Medical History:    Diabetes (HCC)    Dialysis patient (HCC)    [facility]    High blood pressure    High cholesterol    History of blood transfusion    10/2019    Neuropathy    Renal disorder      Past Surgical History:   Procedure Laterality Date    Amputation metatarsal+toe,single      Av fistula revision, open      Cataract      Other      amputation of R great toe      History reviewed. No pertinent family history.   Social History     Socioeconomic History    Marital status: Single   Tobacco Use    Smoking status: Never    Smokeless tobacco: Never   Vaping Use    Vaping status: Never Used   Substance and Sexual Activity    Alcohol use: No     Alcohol/week: 0.0 standard drinks of alcohol    Drug use: No           REVIEW OF SYSTEMS:   Today reviewed systens as documented below  GENERAL HEALTH: feels well otherwise  SKIN: Refer to exam below  RESPIRATORY: denies shortness of breath with exertion  CARDIOVASCULAR: denies chest pain on exertion  GI: denies abdominal pain and denies heartburn  NEURO: denies headaches    EXAM:   There were no vitals taken for this visit.  GENERAL: well developed, well nourished, in no apparent distress  EXTREMITIES:   1. Integument: The skin on his feet is warm and dry he has surgical scars from previous amputations which was the fifth toe and first toe on the right foot and partial fifth ray on the left these incisions remain intact without any sign of infection.  He does have some dry skin and cracking on the lateral aspect of his left foot he was informed of this recommended moisturizing cream once daily.  There are no sores ulcerations or other problems noted there are no interdigital macerations noted.  The callus on the lateral plantar aspect of the left foot has  a skin fissure in it.  Again reminded the patient he needs to do moisturizing to help prevent this from developing.   2. Vascular: The patient has palpable pulses   3. Neurologic: Patient has diminished sensorium is lost protective sensation in all dermatomes on her now deformed foot   4. Musculoskeletal: Patient has amputations as documented above  Bilateral barefoot skin diabetic exam is abnormal with diabetic monofilament/sensation testing abnormal, callus - with deeper color changes, amputation and/or ulceration, and dystrophic nails and/or dry skin   ASSESSMENT AND PLAN:   Diagnoses and all orders for this visit:    Diabetic peripheral neuropathy (HCC)    Callus    History of partial ray amputation of first toe of right foot (HCC)    Personal history of diabetic foot ulcer    ESRD on hemodialysis (HCC)    Callus of foot    OM (onychomycosis)        Plan: Today rendered nail trimming.  Gave him advice on taking care of his feet his shoes are over a-year-old therefore prescribed new shoes at today's visit.    At today's visit I reminded the patient about daily foot checks  Reminded patient again of proper control of his diabetes to help prevent any severe complications in his feet  Proper foot hygiene moisturizing except between the toes was reviewed  Patient knows with signs of infection or go to the emergency room should he notice them.  He was again reminded if he notices any openings or draining wounds he is to come in right away.  Advised follow-up again every 2 to 3 months for routine care but emphasized to him the importance of coming in sooner if he notices any problems.  Patient has not filled his most recent shoe prescription.  Encouraged him to do so.    The patient indicates understanding of these issues and agrees to the plan.  Patient relates she is still on the transplant list.    Brendan Kerr DPM   language

## 2025-03-06 ENCOUNTER — OFFICE VISIT (OUTPATIENT)
Dept: PODIATRY CLINIC | Facility: CLINIC | Age: 40
End: 2025-03-06

## 2025-03-06 DIAGNOSIS — M20.41 HAMMER TOE OF RIGHT FOOT: ICD-10-CM

## 2025-03-06 DIAGNOSIS — N18.6 ESRD ON HEMODIALYSIS (HCC): ICD-10-CM

## 2025-03-06 DIAGNOSIS — L84 CALLUS: ICD-10-CM

## 2025-03-06 DIAGNOSIS — Z86.31 PERSONAL HISTORY OF DIABETIC FOOT ULCER: ICD-10-CM

## 2025-03-06 DIAGNOSIS — B35.1 OM (ONYCHOMYCOSIS): ICD-10-CM

## 2025-03-06 DIAGNOSIS — E11.42 DIABETIC PERIPHERAL NEUROPATHY (HCC): Primary | ICD-10-CM

## 2025-03-06 DIAGNOSIS — Z89.411 HISTORY OF PARTIAL RAY AMPUTATION OF FIRST TOE OF RIGHT FOOT (HCC): ICD-10-CM

## 2025-03-06 DIAGNOSIS — Z99.2 ESRD ON HEMODIALYSIS (HCC): ICD-10-CM

## 2025-03-06 PROCEDURE — 99213 OFFICE O/P EST LOW 20 MIN: CPT | Performed by: PODIATRIST

## 2025-03-06 NOTE — PROGRESS NOTES
Kuldip Santacruz is a 39 year old male.   Chief Complaint   Patient presents with    Diabetic Foot Care     Nail care and foot check- fbg was not checked- A1c 7.4 02/28/25- pcp lov 02/28/25          HPI:   Patient returns to the clinic for routine diabetic footcare he has not noticed any problems but he does have a callus he has been trying to keep filed down on the bottom of his right foot.  There is at today's visit reviewed nurse's history as taken above, allergies medications and medical history as documented below.  All changes duly noted  Allergies: Patient has no known allergies.   Current Outpatient Medications   Medication Sig Dispense Refill    lisinopril 40 MG Oral Tab Take 1 tablet (40 mg total) by mouth daily. 90 tablet 3    amLODIPine 10 MG Oral Tab Take 1 tablet (10 mg total) by mouth daily. 90 tablet 3    metoprolol succinate ER 25 MG Oral Tablet 24 Hr Take 1 tablet (25 mg total) by mouth daily. 90 tablet 3    LITHIUM OR Take by mouth in the morning, at noon, and at bedtime.      Vancomycin HCl 500 MG Intravenous Recon Soln Inject 500 mg into the vein Every Monday, Wednesday, and Friday. Given after hemodialysis on Monday/Wednesday/Friday 18 each 0    amLODIPine 10 MG Oral Tab Take 1 tablet (10 mg total) by mouth daily.      apixaban 2.5 MG Oral Tab Take 1 tablet (2.5 mg total) by mouth 2 (two) times daily.      insulin glargine (LANTUS SOLOSTAR) 100 UNIT/ML Subcutaneous Solution Pen-injector Inject 6 Units into the skin nightly. 15 mL 1    Sevelamer 800 MG Oral Tab Take 1 tablet (800 mg total) by mouth 3 (three) times daily with meals.      lisinopril 40 MG Oral Tab Take 1 tablet (40 mg total) by mouth daily.      Metoprolol Succinate ER 25 MG Oral Tablet 24 Hr Take 1 tablet (25 mg total) by mouth daily. 30 tablet 11    atorvastatin 40 MG Oral Tab Take 1 tablet (40 mg total) by mouth nightly.      ergocalciferol 1.25 MG (37170 UT) Oral Cap Take 1 capsule (50,000 Units total) by mouth every 30  (thirty) days.      calcium acetate 667 MG Oral Cap Take 2 capsules (1,334 mg total) by mouth 3 (three) times daily.        Past Medical History:    Diabetes (HCC)    Dialysis patient    [facility]    High blood pressure    High cholesterol    History of blood transfusion    10/2019    Neuropathy    Renal disorder      Past Surgical History:   Procedure Laterality Date    Amputation metatarsal+toe,single      Av fistula revision, open      Cataract      Other      amputation of R great toe      History reviewed. No pertinent family history.   Social History     Socioeconomic History    Marital status: Single   Tobacco Use    Smoking status: Never    Smokeless tobacco: Never   Vaping Use    Vaping status: Never Used   Substance and Sexual Activity    Alcohol use: No     Alcohol/week: 0.0 standard drinks of alcohol    Drug use: No           REVIEW OF SYSTEMS:   Today reviewed systens as documented below  GENERAL HEALTH: feels well otherwise  SKIN: Refer to exam below  RESPIRATORY: denies shortness of breath with exertion  CARDIOVASCULAR: denies chest pain on exertion  GI: denies abdominal pain and denies heartburn  NEURO: denies headaches    EXAM:   There were no vitals taken for this visit.  GENERAL: well developed, well nourished, in no apparent distress  EXTREMITIES:   1. Integument: The skin on his feet is warm and dry he has surgical scars from previous amputations which was the fifth toe and first toe on the right foot and partial fifth ray on the left these incisions remain intact without any sign of infection.  He does have some dry skin and cracking on the lateral aspect of his left foot he was informed of this recommended moisturizing cream once daily.  The callus underneath the fourth metatarsal head of his right foot is showing some preulcerative changes.  There is no definitive ulcer present.  It was trimmed and debrided with mild hemorrhage treated with silver nitrate antibiotic  ointment and a Band-Aid.  No sign of infection noted.  No fat layer exposed.   2. Vascular: The patient has palpable pulses   3. Neurologic: Patient has diminished sensorium is lost protective sensation in all dermatomes on her now deformed foot   4. Musculoskeletal: Patient has amputations as documented above  Bilateral barefoot skin diabetic exam is abnormal with callus - with deeper color changes, toe deformity - or chronic midfoot/rearfoot, and dystrophic nails and/or dry skin   ASSESSMENT AND PLAN:   Diagnoses and all orders for this visit:    Diabetic peripheral neuropathy (HCC)    Callus    History of partial ray amputation of first toe of right foot (HCC)    ESRD on hemodialysis (HCC)    Personal history of diabetic foot ulcer    OM (onychomycosis)    Hammer toe of right foot        Plan: Today rendered nail trimming.  Gave him advice on taking care of his feet his shoes are over a-year-old therefore prescribed new shoes at today's visit.    At today's visit I reminded the patient about daily foot checks  Reminded patient again of proper control of his diabetes to help prevent any severe complications in his feet  Proper foot hygiene moisturizing except between the toes was reviewed  Patient knows with signs of infection or go to the emergency room should he notice them.  He was again reminded if he notices any openings or draining wounds he is to come in right away.  Advised follow-up again every 2 to 3 months for routine care but emphasized to him the importance of coming in sooner if he notices any problems.  Patient will monitor the callus area follow-up in 3 weeks to make sure the ulcer does not develop.    The patient indicates understanding of these issues and agrees to the plan.  Patient relates she is still on the transplant list.    Brendan Kerr DPM

## 2025-03-27 ENCOUNTER — OFFICE VISIT (OUTPATIENT)
Dept: PODIATRY CLINIC | Facility: CLINIC | Age: 40
End: 2025-03-27

## 2025-03-27 DIAGNOSIS — N18.6 ESRD ON HEMODIALYSIS (HCC): ICD-10-CM

## 2025-03-27 DIAGNOSIS — E11.42 DIABETIC PERIPHERAL NEUROPATHY (HCC): Primary | ICD-10-CM

## 2025-03-27 DIAGNOSIS — L84 CALLUS: ICD-10-CM

## 2025-03-27 DIAGNOSIS — Z89.411 HISTORY OF PARTIAL RAY AMPUTATION OF FIRST TOE OF RIGHT FOOT (HCC): ICD-10-CM

## 2025-03-27 DIAGNOSIS — Z99.2 ESRD ON HEMODIALYSIS (HCC): ICD-10-CM

## 2025-03-27 PROCEDURE — 99213 OFFICE O/P EST LOW 20 MIN: CPT | Performed by: PODIATRIST

## 2025-03-27 NOTE — PROGRESS NOTES
Kuldip Santacruz is a 39 year old male.   Chief Complaint   Patient presents with    Diabetic Foot Care     Pt in for DM f/u- doing well today, FBS this am 110, last A1c 2/18 at 7.4         HPI:   Patient returns to the clinic for checkup on diabetic ulcers calluses.  At today's visit reviewed nurse's history as taken above, allergies medications and medical history as documented below.  All changes duly noted  Allergies: Patient has no known allergies.   Current Outpatient Medications   Medication Sig Dispense Refill    lisinopril 40 MG Oral Tab Take 1 tablet (40 mg total) by mouth daily. 90 tablet 3    amLODIPine 10 MG Oral Tab Take 1 tablet (10 mg total) by mouth daily. 90 tablet 3    metoprolol succinate ER 25 MG Oral Tablet 24 Hr Take 1 tablet (25 mg total) by mouth daily. 90 tablet 3    LITHIUM OR Take by mouth in the morning, at noon, and at bedtime.      Vancomycin HCl 500 MG Intravenous Recon Soln Inject 500 mg into the vein Every Monday, Wednesday, and Friday. Given after hemodialysis on Monday/Wednesday/Friday 18 each 0    apixaban 2.5 MG Oral Tab Take 1 tablet (2.5 mg total) by mouth 2 (two) times daily.      insulin glargine (LANTUS SOLOSTAR) 100 UNIT/ML Subcutaneous Solution Pen-injector Inject 6 Units into the skin nightly. 15 mL 1    Sevelamer 800 MG Oral Tab Take 1 tablet (800 mg total) by mouth 3 (three) times daily with meals.      lisinopril 40 MG Oral Tab Take 1 tablet (40 mg total) by mouth daily.      Metoprolol Succinate ER 25 MG Oral Tablet 24 Hr Take 1 tablet (25 mg total) by mouth daily. 30 tablet 11    atorvastatin 40 MG Oral Tab Take 1 tablet (40 mg total) by mouth nightly.      ergocalciferol 1.25 MG (48739 UT) Oral Cap Take 1 capsule (50,000 Units total) by mouth every 30 (thirty) days.      calcium acetate 667 MG Oral Cap Take 2 capsules (1,334 mg total) by mouth 3 (three) times daily.        Past Medical History:    Diabetes (HCC)    Dialysis patient    [facility]  M-W-F    High blood pressure    High cholesterol    History of blood transfusion    10/2019    Neuropathy    Renal disorder      Past Surgical History:   Procedure Laterality Date    Amputation metatarsal+toe,single      Av fistula revision, open      Cataract      Other      amputation of R great toe      No family history on file.   Social History     Socioeconomic History    Marital status: Single   Tobacco Use    Smoking status: Never    Smokeless tobacco: Never   Vaping Use    Vaping status: Never Used   Substance and Sexual Activity    Alcohol use: No     Alcohol/week: 0.0 standard drinks of alcohol    Drug use: No           REVIEW OF SYSTEMS:   Today reviewed systens as documented below  GENERAL HEALTH: feels well otherwise  SKIN: Refer to exam below  RESPIRATORY: denies shortness of breath with exertion  CARDIOVASCULAR: denies chest pain on exertion  GI: denies abdominal pain and denies heartburn  NEURO: denies headaches    EXAM:   There were no vitals taken for this visit.  GENERAL: well developed, well nourished, in no apparent distress  EXTREMITIES:   1. Integument: The skin on his feet was evaluated is warm and dry the ulceration at the fourth metatarsal head of the right foot is healed with just a callus at this time it was trimmed and debrided using a 15 blade.  He has a mild hyperkeratosis on the lateral aspect of his left foot.   2. Vascular: Patient has palpable pulses   3. Neurologic: Patient has diabetic neuropathy with loss of protective sensation and pain sensation history of partial ray amputations   4. Musculoskeletal: Patient has partial fifth ray amputations bilateral feet    ASSESSMENT AND PLAN:   Diagnoses and all orders for this visit:    Diabetic peripheral neuropathy (HCC)    Callus    History of partial ray amputation of first toe of right foot (HCC)    ESRD on hemodialysis (HCC)        Plan: At today's visit just trim and debrided hyperkeratoses he will keep them filed down and  follow-up in 2 months skin recommended good moisturizer.    The patient indicates understanding of these issues and agrees to the plan.    Brendan Kerr DPM

## 2025-05-29 ENCOUNTER — OFFICE VISIT (OUTPATIENT)
Dept: PODIATRY CLINIC | Facility: CLINIC | Age: 40
End: 2025-05-29

## 2025-05-29 DIAGNOSIS — N18.6 ESRD ON HEMODIALYSIS (HCC): ICD-10-CM

## 2025-05-29 DIAGNOSIS — B35.1 OM (ONYCHOMYCOSIS): ICD-10-CM

## 2025-05-29 DIAGNOSIS — E11.42 DIABETIC PERIPHERAL NEUROPATHY (HCC): Primary | ICD-10-CM

## 2025-05-29 DIAGNOSIS — L84 CALLUS OF FOOT: ICD-10-CM

## 2025-05-29 DIAGNOSIS — Z99.2 ESRD ON HEMODIALYSIS (HCC): ICD-10-CM

## 2025-05-29 DIAGNOSIS — L84 CALLUS: ICD-10-CM

## 2025-05-29 DIAGNOSIS — Z89.411 HISTORY OF PARTIAL RAY AMPUTATION OF FIRST TOE OF RIGHT FOOT (HCC): ICD-10-CM

## 2025-05-29 DIAGNOSIS — Z86.31 PERSONAL HISTORY OF DIABETIC FOOT ULCER: ICD-10-CM

## 2025-05-29 PROCEDURE — 99213 OFFICE O/P EST LOW 20 MIN: CPT | Performed by: PODIATRIST

## 2025-05-29 RX ORDER — ASPIRIN 81 MG/1
81 TABLET ORAL DAILY
COMMUNITY
Start: 2023-02-16

## 2025-05-29 RX ORDER — CLOPIDOGREL BISULFATE 75 MG/1
75 TABLET ORAL DAILY
COMMUNITY

## 2025-05-29 RX ORDER — AMOXICILLIN AND CLAVULANATE POTASSIUM 500; 125 MG/1; MG/1
500 TABLET, FILM COATED ORAL
COMMUNITY
Start: 2025-05-09 | End: 2025-06-06

## 2025-05-29 RX ORDER — INSULIN GLARGINE-YFGN 100 [IU]/ML
INJECTION, SOLUTION SUBCUTANEOUS
COMMUNITY
Start: 2025-04-27

## 2025-05-29 NOTE — PROGRESS NOTES
Kuldip Santacruz is a 39 year old male.   Chief Complaint   Patient presents with    Foot Pain     Bilateral foot callus f/u- FBS this am =145         HPI:   Patient returns to clinic for routine care has not noticed any drainage or other problem he has a history of partial ray amputations.  He recently had an abscess drained on his left thigh.  At today's visit reviewed nurse's history as taken above, allergies medications and medical history as documented below.  All changes duly noted  Allergies: Patient has no known allergies.   Current Medications[1]   Past Medical History[2]   Past Surgical History[3]   Family History[4]   Social Hx on file[5]        REVIEW OF SYSTEMS:   Today reviewed systens as documented below  GENERAL HEALTH: feels well otherwise  SKIN: Refer to exam below  RESPIRATORY: denies shortness of breath with exertion  CARDIOVASCULAR: denies chest pain on exertion  GI: denies abdominal pain and denies heartburn  NEURO: denies headaches    EXAM:   There were no vitals taken for this visit.  GENERAL: well developed, well nourished, in no apparent distress  EXTREMITIES:   1. Integument: The skin on his feet is warm and dry his surgical scars from a partial first ray amputation partial fifth ray amputation on the right.  He has had a partial fifth ray amputation as well on the left.  The scars are well-healed there are hyperkeratoses along the lateral aspect distally on both feet.  These underlie the remaining fourth metatarsal head area.  The 1 on the left has a small fissure in it but there is no open draining area noted.  His remaining toenails on both feet are thickened and dystrophic characteristic of onychomycosis.   2. Vascular: Patient has weakly palpable pulses bilateral   3. Neurologic: Patient has diminished peripheral sensation loss of protective sensation bilateral feet   4. Musculoskeletal: Patient has amputation    ASSESSMENT AND PLAN:   Diagnoses and all orders for this  visit:    Diabetic peripheral neuropathy (HCC)    Callus    History of partial ray amputation of first toe of right foot (HCC)    ESRD on hemodialysis (HCC)    Personal history of diabetic foot ulcer    OM (onychomycosis)    Callus of foot        Plan: Today using a nail nippers were trimmed and debrided toenails 2-4 on the right and 1-4 on the left manually and mechanically in girth and width as far down to healthy tissue as possible on both feet.  This was done uneventfully there was no hemorrhage.  There is mild incurvation of the medial and lateral nail borders of the hallux which using a slant back technique were removed and they would not get ingrown.  Using a sterile 15 blade trim down debride all hyperkeratoses.  At today's visit I reminded the patient about daily foot checks  Reminded patient again of proper control of his diabetes to help prevent any severe complications in his feet  Proper foot hygiene moisturizing except between the toes was reviewed  Advised follow-up again every 2 to 3 months for routine care but emphasized to him the importance of coming in sooner if he notices any problems.  Reviewed signs of infection with the patient if he gets those he needs to go to urgent care and/or emergency room and have us paged.      The patient indicates understanding of these issues and agrees to the plan.    Brendan Kerr DPM       [1]   Current Outpatient Medications   Medication Sig Dispense Refill    amoxicillin clavulanate 500-125 MG Oral Tab Take 1 tablet (500 mg total) by mouth.      aspirin 81 MG Oral Tab EC Take 1 tablet (81 mg total) by mouth daily.      cholecalciferol (D 1000) 25 MCG (1000 UT) Oral Cap Take by mouth As Directed.      clopidogrel 75 MG Oral Tab Take 1 tablet (75 mg total) by mouth daily.      Insulin Glargine-yfgn 100 UNIT/ML Subcutaneous Solution Pen-injector INJECT 6 UNITS SUBCUTANEOUSLY AT BEDTIME DISCARD PEN IN USE AFTER 28 DAYS      lisinopril 40 MG Oral Tab Take 1  tablet (40 mg total) by mouth daily. 90 tablet 3    amLODIPine 10 MG Oral Tab Take 1 tablet (10 mg total) by mouth daily. 90 tablet 3    metoprolol succinate ER 25 MG Oral Tablet 24 Hr Take 1 tablet (25 mg total) by mouth daily. 90 tablet 3    LITHIUM OR Take by mouth in the morning, at noon, and at bedtime.      Vancomycin HCl 500 MG Intravenous Recon Soln Inject 500 mg into the vein Every Monday, Wednesday, and Friday. Given after hemodialysis on Monday/Wednesday/Friday 18 each 0    apixaban 2.5 MG Oral Tab Take 1 tablet (2.5 mg total) by mouth 2 (two) times daily.      insulin glargine (LANTUS SOLOSTAR) 100 UNIT/ML Subcutaneous Solution Pen-injector Inject 6 Units into the skin nightly. 15 mL 1    Sevelamer 800 MG Oral Tab Take 1 tablet (800 mg total) by mouth 3 (three) times daily with meals.      lisinopril 40 MG Oral Tab Take 1 tablet (40 mg total) by mouth daily.      Metoprolol Succinate ER 25 MG Oral Tablet 24 Hr Take 1 tablet (25 mg total) by mouth daily. 30 tablet 11    atorvastatin 40 MG Oral Tab Take 1 tablet (40 mg total) by mouth nightly.      ergocalciferol 1.25 MG (48996 UT) Oral Cap Take 1 capsule (50,000 Units total) by mouth every 30 (thirty) days.      calcium acetate 667 MG Oral Cap Take 2 capsules (1,334 mg total) by mouth 3 (three) times daily.     [2]   Past Medical History:   Diabetes (HCC)    [facility]    High blood pressure    High cholesterol    History of blood transfusion    10/2019    Neuropathy    Renal disorder   [3]   Past Surgical History:  Procedure Laterality Date    Amputation metatarsal+toe,single      Av fistula revision, open      Cataract      Other      amputation of R great toe   [4] History reviewed. No pertinent family history.  [5]   Social History  Socioeconomic History    Marital status: Single   Tobacco Use    Smoking status: Never    Smokeless tobacco: Never   Vaping Use    Vaping status: Never Used   Substance and Sexual  Activity    Alcohol use: No     Alcohol/week: 0.0 standard drinks of alcohol    Drug use: No

## 2025-06-12 ENCOUNTER — MED REC SCAN ONLY (OUTPATIENT)
Dept: NEPHROLOGY | Facility: CLINIC | Age: 40
End: 2025-06-12

## 2025-07-31 ENCOUNTER — OFFICE VISIT (OUTPATIENT)
Dept: PODIATRY CLINIC | Facility: CLINIC | Age: 40
End: 2025-07-31

## 2025-07-31 DIAGNOSIS — Z89.411 HISTORY OF PARTIAL RAY AMPUTATION OF FIRST TOE OF RIGHT FOOT (HCC): ICD-10-CM

## 2025-07-31 DIAGNOSIS — L84 CALLUS OF FOOT: ICD-10-CM

## 2025-07-31 DIAGNOSIS — N18.6 ESRD ON HEMODIALYSIS (HCC): ICD-10-CM

## 2025-07-31 DIAGNOSIS — B35.1 OM (ONYCHOMYCOSIS): ICD-10-CM

## 2025-07-31 DIAGNOSIS — E11.42 DIABETIC PERIPHERAL NEUROPATHY (HCC): Primary | ICD-10-CM

## 2025-07-31 DIAGNOSIS — Z86.31 PERSONAL HISTORY OF DIABETIC FOOT ULCER: ICD-10-CM

## 2025-07-31 DIAGNOSIS — Z99.2 ESRD ON HEMODIALYSIS (HCC): ICD-10-CM

## 2025-07-31 DIAGNOSIS — L84 CALLUS: ICD-10-CM

## 2025-07-31 DIAGNOSIS — M21.6X2 PLANTAR FLEXED METATARSAL, LEFT: ICD-10-CM

## 2025-07-31 DIAGNOSIS — M20.41 HAMMER TOE OF RIGHT FOOT: ICD-10-CM

## 2025-07-31 PROCEDURE — 99213 OFFICE O/P EST LOW 20 MIN: CPT | Performed by: PODIATRIST

## (undated) DEVICE — STRETCH BANDAGE ROLL: Brand: DERMACEA

## (undated) DEVICE — PREMIUM WET SKIN PREP TRAY: Brand: MEDLINE INDUSTRIES, INC.

## (undated) DEVICE — TRANSPOSAL ULTRAFLEX DUO/QUAD ULTRA CART MANIFOLD

## (undated) DEVICE — GAUZE SPONGES,12 PLY: Brand: CURITY

## (undated) DEVICE — GOWN SURG AERO CHROME XXL

## (undated) DEVICE — SKIN AFFIX .4ML

## (undated) DEVICE — STERILE SYNTHETIC POLYISOPRENE POWDER-FREE SURGICAL GLOVES WITH HYDROGEL COATING, SMOOTH FINISH, STRAIGHT FINGER: Brand: PROTEXIS

## (undated) DEVICE — SUTURE VICRYL 2-0 CT-1

## (undated) DEVICE — MARKER SKIN 2 TIP

## (undated) DEVICE — HEMOCLIP MED 24 CLIP/CARTRIDGE

## (undated) DEVICE — FOGARTY ARTERIAL EMBOLECTOMY CATHETER 3F 40CM: Brand: FOGARTY

## (undated) DEVICE — STANDARD HYPODERMIC NEEDLE,POLYPROPYLENE HUB: Brand: MONOJECT

## (undated) DEVICE — 3M™ STERI-STRIP™ REINFORCED ADHESIVE SKIN CLOSURES, R1547, 1/2 IN X 4 IN (12 MM X 100 MM), 6 STRIPS/ENVELOPE: Brand: 3M™ STERI-STRIP™

## (undated) DEVICE — PROXIMATE RH ROTATING HEAD SKIN STAPLERS (35 WIDE) CONTAINS 35 STAINLESS STEEL STAPLES: Brand: PROXIMATE

## (undated) DEVICE — BANDAGE ROLL,100% COTTON, 6 PLY, LARGE: Brand: KERLIX

## (undated) DEVICE — GEL AQUASONIC 100 20GR

## (undated) DEVICE — SUTURE PROLENE 6-0 C-1

## (undated) DEVICE — ENCORE® PERRY STYLE 42 PF SIZE 7.5, STERILE LATEX POWDER-FREE SURGICAL GLOVE: Brand: ENCORE

## (undated) DEVICE — UNDYED BRAIDED (POLYGLACTIN 910), SYNTHETIC ABSORBABLE SUTURE: Brand: COATED VICRYL

## (undated) DEVICE — DRAPE MINI C-ARM

## (undated) DEVICE — STERILE POLYISOPRENE POWDER-FREE SURGICAL GLOVES: Brand: PROTEXIS

## (undated) DEVICE — LOWER EXTREMITY CDS-LF: Brand: MEDLINE INDUSTRIES, INC.

## (undated) DEVICE — 450 ML BOTTLE OF 0.05% CHLORHEXIDINE GLUCONATE IN 99.95% STERILE WATER FOR IRRIGATION, USP AND APPLICATOR.: Brand: IRRISEPT ANTIMICROBIAL WOUND LAVAGE

## (undated) DEVICE — CONVERTORS STOCKINETTE: Brand: CONVERTORS

## (undated) DEVICE — FOGARTY ARTERIAL EMBOLECTOMY CATHETER 4F 40CM: Brand: FOGARTY

## (undated) DEVICE — STRL PENROSE DRAIN 18" X 1/4": Brand: CARDINAL HEALTH

## (undated) DEVICE — DRAPE,EXTREMITY,89X128,STERILE: Brand: MEDLINE

## (undated) DEVICE — STOCKINETTE HYDROMED 6\" 706044

## (undated) DEVICE — SUTURE VICRYL 3-0 CT-1

## (undated) DEVICE — KENDALL SCD EXPRESS SLEEVES, KNEE LENGTH, MEDIUM: Brand: KENDALL SCD

## (undated) DEVICE — SUTURE POLYDEK 4-0 TIES 6-932

## (undated) DEVICE — REM POLYHESIVE ADULT PATIENT RETURN ELECTRODE: Brand: VALLEYLAB

## (undated) DEVICE — SOL  .9 1000ML BTL

## (undated) DEVICE — SUTURE PROLENE 7-0 BV-1

## (undated) DEVICE — SOL  .9 500ML

## (undated) DEVICE — NON-ADHERENT STRIPS,OIL EMULSION: Brand: CURITY

## (undated) DEVICE — SUTURE PROLENE 5-0 C-1

## (undated) DEVICE — 3M™ IOBAN™ 2 ANTIMICROBIAL INCISE DRAPE 6650EZ: Brand: IOBAN™ 2

## (undated) DEVICE — SPECIMEN CONTAINER,POSITIVE SEAL INDICATOR, OR PACKAGED: Brand: PRECISION

## (undated) DEVICE — INTENDED TO BE USED TO OCCLUDE, RETRACT AND IDENTIFY ARTERIES, VEINS, TENDONS AND NERVES IN SURGICAL PROCEDURES: Brand: STERION®  VESSEL LOOP

## (undated) DEVICE — TOWEL OR BLU 16X26 STRL

## (undated) DEVICE — BLADE SAW KM33-11

## (undated) DEVICE — TOWEL SURG OR 17X30IN BLUE

## (undated) DEVICE — SCD SLEEVE KNEE HI BLEND

## (undated) DEVICE — SUTURE ETHILON 3-0 FSL

## (undated) DEVICE — CV PACK-LF: Brand: MEDLINE INDUSTRIES, INC.

## (undated) DEVICE — DISPOSABLE TOURNIQUET CUFF SINGLE BLADDER, DUAL PORT AND QUICK CONNECT CONNECTOR: Brand: COLOR CUFF

## (undated) DEVICE — 1200CC GUARDIAN II: Brand: GUARDIAN

## (undated) DEVICE — C-ARM: Brand: UNBRANDED

## (undated) DEVICE — ZIMMER® STERILE DISPOSABLE TOURNIQUET CUFF WITH PLC, DUAL PORT, SINGLE BLADDER, 18 IN. (46 CM)

## (undated) DEVICE — OCCLUSIVE GAUZE STRIP OVERWRAP,3% BISMUTH TRIBROMOPHENATE IN PETROLATUM BLEND: Brand: XEROFORM

## (undated) DEVICE — 3M™ IOBAN™ 2 ANTIMICROBIAL INCISE DRAPE 6651EZ: Brand: IOBAN™ 2

## (undated) DEVICE — FOGARTY EMBOLECTOMY CATHETER: Brand: FOGARTY

## (undated) DEVICE — SUTURE VICRYL 3-0 SH

## (undated) DEVICE — 1 ML TUBERCULIN SYRINGE REGULAR TIP: Brand: MONOJECT

## (undated) NOTE — LETTER
9/7/2021          To Whom It May Concern:    Lilli Alegria is currently under my medical care and may not return to work at this time. Please excuse Darrell Serna for 4 weeks. If you require additional information please contact our office.         Since

## (undated) NOTE — LETTER
Indira Howell [address]  [address] [facility] [address], [address] [facility]  Authorization for Surgical Operation and Procedure     Date:___________                                                                                                         Time:__________ 4.   Should the need arise during my operation or immediate post-operative period, I also consent to the administration of blood and/or blood products.   Further, I understand that despite careful testing and screening of blood or blood products by stuart 8.   I recognize that in the event my procedure results in extended X-Ray/fluoroscopy time, I may develop a skin reaction. 9.  If I have a Do Not Attempt Resuscitation (DNAR) order in place, that status will be suspended while in the operating room, proc 1. I, [address] agree to be cared for by an anesthesiologist, who is specially trained to monitor me and give me medicine to put me to sleep or keep me comfortable during my procedure    I understand that my anesthesiologist is not an employee or 5. My doctor has explained to me other choices available to me for my care (alternatives).   6. Pregnant Patients (“epidural”):  I understand that the risks of having an epidural (medicine given into my back to help control pain during labor), include itchi

## (undated) NOTE — LETTER
Indira Howell [address]  [address] [facility] [address], [address] [facility]  Authorization for Surgical Operation and Procedure     Date:___________                                                                                                         Time:__________ can occur: fever and allergic reactions, hemolytic reactions, transmission of diseases such as Hepatitis, AIDS and Cytomegalovirus (CMV) and fluid overload.   In the event that I wish to have an autologous transfusion of my own blood, or a directed donor tr the applicable recovery period ends for purposes of reinstating the DNAR order.   10. Patients having a sterilization procedure: I understand that if the procedure is successful the results will be permanent and it will therefore be impossible for me to ins doctor) to give me medicine and do additional procedures as necessary.  Some examples are: Starting or using an “IV” to give me medicine, fluids or blood during my procedure, and having a breathing tube placed to help me breathe when I’m asleep (intubation) understand that rare but potential complications include headache, bleeding, infection, seizure, irregular heart rhythms, and nerve injury.     I can change my mind about having anesthesia services at any time before I get the medicine.    _________________

## (undated) NOTE — LETTER
[facility] [address], [address]  Consent for Procedure/Sedation    Date: November 16, 2020    Time: 1420      1. I authorize the performance upon Christine Hess the following:  Transesophageal Echocardiogram    2.  Elvira Mantel Printed: 2020   2:19 PM  Patient Name: Ricardo Naik        : 1985       Medical Record #: [id number]

## (undated) NOTE — LETTER
1/13/2021              [address] [place] [id number]         To Whom It May Concern,    Shaneka Terry, is under my medical care and may return to work for the next two weeks under light duty start

## (undated) NOTE — LETTER
[facility] [address], [address]  Consent for Procedure/Sedation    Date: 11/16/2020   Time: 1100      1.  I authorize the performance upon Murguia Brood the following:  Insertion Central Venous Access (permanent Dialysis Cat Printed: 2020   11:10 AM  Patient Name: Cheri Santo        : 1985       Medical Record #: [id number]

## (undated) NOTE — ED AVS SNAPSHOT
[facility] Emergency Department    [place]  [address] [id number]    Phone:  [phone]    Fax:  [facility]   MRN: [id number]    Department:  [facility] Emergency Department   Date of Visit:  5 IF THERE IS ANY CHANGE OR WORSENING OF YOUR CONDITION, CALL YOUR PRIMARY CARE PHYSICIAN AT ONCE OR RETURN IMMEDIATELY TO THE EMERGENCY DEPARTMENT.     If you have been prescribed any medication(s), please fill your prescription right away and begin taking t

## (undated) NOTE — ED AVS SNAPSHOT
[facility] Emergency Department    [place]  [address] [address]    Phone:  [phone]    Fax:  [address]   MRN: [id number]    Department:  [facility] Emergency Department   Date of Visit:  5 self-assessment the day after your visit. You may also receive a call from our patient liason soon after your visit. Also, some patients receive a detailed feedback survey mailed to them a week after the visit.   If you receive this, we would really apprec [address] [phone] [address] (68 [facility] [id number] [address] ([address]) [address] [place] [address] If you have questions, you can call [phone] to talk to our [facility] Staff. Remember, [facility] is NOT to be used for urgent needs. For medical emergencies, dial 911.

## (undated) NOTE — ED AVS SNAPSHOT
Cher Genao   MRN: [id number]    Department:  [facility] Emergency Department   Date of Visit:  9/23/2019           Disclosure     Insurance plans vary and the physician(s) referred by the ER may not be covered by your plan.  Please contact tell this physician (or your personal doctor if your instructions are to return to your personal doctor) about any new or lasting problems. The primary care or specialist physician will see patients referred from the [facility] Emergency Department.  Michael Ponce

## (undated) NOTE — LETTER
Jeffrey Kim M.D., F.A.C.S. Clinton Davila M.D., F.A.C.S. Belen Cabrera M.D., Ron [place]. ARNALDO Tinsley M.D., F.A.C.S. Yvette Arce. Alma Tellez M.D., F.A.C.S. DEBI Tiwari M. Belle Carrow A.D. Lezlie Christen, M.D., F. To that end, on the following page we will ask you some questions to make certain that you understand everything which has been explained to you.  Included in this understanding is that there are both surgical and nonsurgical treatments available for you, t A [facility] ([facility]) surgeon has met with me and explained the matter of my illness, and what treatments might be available to improve my condition.  As a result of that conversation, I understand the following:    A [facility] surgeon met with The nature and options for treatment for my condition have been explained to me, in detail, by a [facility] surgeon and all questions have been answered to my satisfaction.  I understand that I am not required to undergo surgery, and further, that if I so desire,

## (undated) NOTE — LETTER
Indira Howell 182  [address] [facility] [address], [address] [facility]  Authorization for Surgical Operation and Procedure     Date:___________                                                                                                         Time:__________ potential risks that can occur: fever and allergic reactions, hemolytic reactions, transmission of diseases such as Hepatitis, AIDS and Cytomegalovirus (CMV) and fluid overload.   In the event that I wish to have an autologous transfusion of my own blood, o attending physician will determine when the applicable recovery period ends for purposes of reinstating the DNAR order.   10. Patients having a sterilization procedure: I understand that if the procedure is successful the results will be permanent and it wi a. Allow the anesthesiologist (anesthesia doctor) to give me medicine and do additional procedures as necessary.  Some examples are: Starting or using an “IV” to give me medicine, fluids or blood during my procedure, and having a breathing tube placed to he 7. Regional Anesthesia (“spinal”, “epidural”, & “nerve blocks”): I understand that rare but potential complications include headache, bleeding, infection, seizure, irregular heart rhythms, and nerve injury.     I can change my mind about having anesthesia

## (undated) NOTE — LETTER
Flordia Haw A. Barbette Lefort, M.D., F.A.C.S. Merrill Valle M.D., F.A.C.S. Darus Gowers M.D., Yemi Rubio. ARNALDO Da Silva M.D., F.A.C.SJoaquina Silverman. Alexis Nieto M.D., F.A.C.S. Janean Cota, M.D. Carline Ferrari, M. Harvy Leach A.D. Danna Cranker, M.D., F. To that end, on the following page we will ask you some questions to make certain that you understand everything which has been explained to you.  Included in this understanding is that there are both surgical and nonsurgical treatments available for you, t A [facility] ([facility] surgeon has met with me and explained the matter of my illness, and what treatments might be available to improve my condition.  As a result of that conversation, I understand the following:    A [facility] surgeon met with The nature and options for treatment for my condition have been explained to me, in detail, by a [facility] surgeon and all questions have been answered to my satisfaction.  I understand that I am not required to undergo surgery, and further, that if I so desire,

## (undated) NOTE — LETTER
Anibal Daniel M.D., F.A.C.S. Boogie Torres M.D., F.A.C.S. Jackie Wiley M.D., Law Dawn. ARNALDO Paul M.D., F.A.C.S. Carson Bro. Mary Thacker M.D., F.A.C.S. Fco Fernandez M.D. CORBY Padilla M.D., F. chance to have all of your questions and concerns answered. If there are any issues which have not been adequately addressed, we ask you to bring them forward so that we can thoroughly address them.     A patient who is fully informed and understands their treatment, among other options and the risks and benefits of the different treatment options:    Yes _____ No _____    A [facility] surgeon as explained to me that if I should so desire, he/she is willing to explain my case and the surgical and non-surgical optio

## (undated) NOTE — LETTER
[facility] [address], [address]  Consent for Procedure/Sedation    Date:     Time:       1.  I authorize the performance upon Amrit Landrum the following:  LEFT ARM FISTULOGRAM, POSSIBLE BALLOON ANGIOPLASTY, POSSIBLE THROMBO Printed: 11/3/2020   9:19 AM  Patient Name: Bob Maya        : 1985       Medical Record #: [id number]

## (undated) NOTE — LETTER
12/14/2021          To Whom It May Concern:    Lilli Alegria is currently under my medical care and may return to work at this time. Darrell Kareem  may return to work on 12/20/21.  Full duty  Activity is restricted as follows: None Darrell Serna can return to fu

## (undated) NOTE — LETTER
Date & Time: 9/23/2019, 6:43 PM  Patient: Ricardo Naik  Encounter Provider(s):    MD Radha Kim [place]         This certifies that Silvino Jose Daniel, a patient at an [facility], am leaving the fa

## (undated) NOTE — LETTER
6/19/2020          To Whom It May Concern:    Genie Mueller is currently under my medical care and may not return to work at this time. Please excuse Blaire Mccain for 6 weeks. We will be seeing Blaire Mccain every 2 weeks.     If you require additional inform

## (undated) NOTE — LETTER
9/7/2021          To Whom It May Concern:    James Bonds is currently under my medical care and may not return to  at this time. Please excuse Sawyer Oneil for 4 weeks.    He may return to work on 11/5/2021  If you require additional information please

## (undated) NOTE — LETTER
To Whom It May Concern:    Shannen Love is currently under my medical care and may  return to work with no restrictions on Friday 1/29/2021. If you require additional information please contact our office.     Sincerely,    Scotty Wells DPM

## (undated) NOTE — LETTER
Chris Castaneda M.D., F.A.C.S. Addy Hu M.D., F.A.C.S. Chantell Griffin M.D., Virgilio Vasques. ARNALDO Dee M.D., F.A.C.S. Jonathan Miller. Leonila Chun M.D., F.A.C.S. Mauro Espana M.D. CORBY Quezada M.D., F. To that end, on the following page we will ask you some questions to make certain that you understand everything which has been explained to you.  Included in this understanding is that there are both surgical and nonsurgical treatments available for you, t A [facility] ([facility] surgeon has met with me and explained the matter of my illness, and what treatments might be available to improve my condition.  As a result of that conversation, I understand the following:    A [facility] surgeon met with The nature and options for treatment for my condition have been explained to me, in detail, by a [facility] surgeon and all questions have been answered to my satisfaction.  I understand that I am not required to undergo surgery, and further, that if I so desire,

## (undated) NOTE — LETTER
10/2/2020              [address] [place] [id number]         To Whom It May Concern,    José Luis Jacob may return to work 4 days per week for 6 hours per day.     Please call my office with questions

## (undated) NOTE — LETTER
1. I authorize the performance upon Jaya Jacobo the following: bilateral upper extremity venogram    2. I authorize Dr. Yue Garcia (and whomever is designated as the doctor’s assistant), to perform the above mentioned procedures.     3. If any unforeseen co

## (undated) NOTE — LETTER
[facility] [address], [address] [place] [address]    Consent for Anesthesia   1.    Ramiro Joann agree to be cared for by a physician anesthesiologist alone and/or with a nurse anesthetist, who is specially trained to monitor me and give · Rare risks include: remembering what happened during my procedure, allergic reactions to medications, injury to my airway, heart, lungs, vision, nerves, or muscles and in extremely rare instances death.   5. My doctor has explained to me other choices zenaida Patient Name: Viat Ford     : 1985                 Printed: 11/10/2020 at 11:46 AM    Medical Record #: [id number]                                            Page 1 of 1

## (undated) NOTE — LETTER
Indira Howell [address]  [address] [place] [address], [address] [place] [address]    Consent for Operation  Date: __________________                                Time: _______________    1.  I authorize the performance upon James Bonds the following operation:  Proced procedure has been videotaped, the surgeon will obtain the original videotape. The hospital will not be responsible for storage or maintenance of this tape.   7. For the purpose of advancing medical education, I consent to the admittance of observers to the STATEMENTS REQUIRING INSERTION OR COMPLETION WERE FILLED IN.     Signature of Patient:   ___________________________    When the patient is a minor or mentally incompetent to give consent:  Signature of person authorized to consent for patient: ____________ supplements, and pills I can buy without a prescription (including street drugs/illegal medications). Failure to inform my anesthesiologist about these medicines may increase my risk of anesthetic complications. iv.  If I am allergic to anything or have ha Anesthesiologist Signature     Date   Time  I have discussed the procedure and information above with the patient (or patient’s representative) and answered their questions. The patient or their representative has agreed to have anesthesia services.     ___

## (undated) NOTE — LETTER
[facility] [address], [address]  Consent for Procedure/Sedation    Date: 11/13/2020   Time: 1230      1. I authorize the performance upon Jaden Lynn the following:  Removal Permanent Dialysis Catheter     2.  I authorize Printed: 2020   12:22 PM  Patient Name: Lisa Armas        : 1985       Medical Record #: [id number]

## (undated) NOTE — LETTER
Indira Howell [address]  [address] [facility] [address], [address] [facility]  Authorization for Surgical Operation and Procedure     Date:___________                                                                                                         Time:__________ hemolytic reactions, transmission of diseases such as Hepatitis, AIDS and Cytomegalovirus (CMV) and fluid overload. In the event that I wish to have an autologous transfusion of my own blood, or a directed donor transfusion.   I will discuss this with my p purposes of reinstating the DNAR order. 10. Patients having a sterilization procedure: I understand that if the procedure is successful the results will be permanent and it will therefore be impossible for me to inseminate, conceive, or bear children.   I procedures as necessary. Some examples are: Starting or using an “IV” to give me medicine, fluids or blood during my procedure, and having a breathing tube placed to help me breathe when I’m asleep (intubation).  In the event that my heart stops working pro include headache, bleeding, infection, seizure, irregular heart rhythms, and nerve injury.     I can change my mind about having anesthesia services at any time before I get the medicine.    __________________________________________________________________

## (undated) NOTE — LETTER
[facility] [address], [address]  Consent for Procedure/Sedation    Date:11/10/2020   Time: 1150        1.  I authorize the performance upon Harvie Dancer the following:  Bilateral upper extremity venogram.     2. I authorize Printed: 11/10/2020   11:53 AM  Patient Name: Lisa Klein        : 1985       Medical Record #: [id number]

## (undated) NOTE — LETTER
Indira Howell 89  [address] [facility] [address], [address] [facility]  Authorization for Surgical Operation and Procedure     Date:___________                                                                                                         Time:__________ 4.   Should the need arise during my operation or immediate post-operative period, I also consent to the administration of blood and/or blood products.   Further, I understand that despite careful testing and screening of blood or blood products by stuart 8.   I recognize that in the event my procedure results in extended X-Ray/fluoroscopy time, I may develop a skin reaction. 9.  If I have a Do Not Attempt Resuscitation (DNAR) order in place, that status will be suspended while in the operating room, proc 1. I, [address] agree to be cared for by an anesthesiologist, who is specially trained to monitor me and give me medicine to put me to sleep or keep me comfortable during my procedure    I understand that my anesthesiologist is not an employee or 5. My doctor has explained to me other choices available to me for my care (alternatives).   6. Pregnant Patients (“epidural”):  I understand that the risks of having an epidural (medicine given into my back to help control pain during labor), include itchi

## (undated) NOTE — LETTER
[facility] [address], [address]  Consent for Procedure/Sedation    Date: 11/11/2020    Time: 0600      1. I authorize the performance upon Cheri Santo the following:  Creation of Left/possible Right Arm fistula    2.  I au Printed: 2020   5:31 AM  Patient Name: Daphnie French        : 1985       Medical Record #: [id number]